# Patient Record
Sex: FEMALE | Race: WHITE | Employment: OTHER | URBAN - METROPOLITAN AREA
[De-identification: names, ages, dates, MRNs, and addresses within clinical notes are randomized per-mention and may not be internally consistent; named-entity substitution may affect disease eponyms.]

---

## 2018-01-29 PROBLEM — D22.30 COMPOUND NEVUS OF FACE: Status: ACTIVE | Noted: 2018-01-29

## 2022-12-30 ENCOUNTER — HOSPITAL ENCOUNTER (EMERGENCY)
Age: 87
Discharge: HOME | End: 2022-12-30
Attending: EMERGENCY MEDICINE
Payer: MEDICARE

## 2022-12-30 ENCOUNTER — APPOINTMENT (OUTPATIENT)
Dept: GENERAL RADIOLOGY | Age: 87
End: 2022-12-30
Payer: MEDICARE

## 2022-12-30 VITALS
RESPIRATION RATE: 18 BRPM | HEART RATE: 82 BPM | DIASTOLIC BLOOD PRESSURE: 65 MMHG | SYSTOLIC BLOOD PRESSURE: 151 MMHG | TEMPERATURE: 97.7 F | OXYGEN SATURATION: 95 %

## 2022-12-30 DIAGNOSIS — M23.92 INTERNAL DERANGEMENT OF LEFT KNEE: Primary | ICD-10-CM

## 2022-12-30 PROCEDURE — 73562 X-RAY EXAM OF KNEE 3: CPT

## 2022-12-30 PROCEDURE — 99283 EMERGENCY DEPT VISIT LOW MDM: CPT

## 2022-12-30 RX ORDER — BUMETANIDE 0.5 MG/1
0.5 TABLET ORAL DAILY
COMMUNITY

## 2022-12-30 ASSESSMENT — PAIN - FUNCTIONAL ASSESSMENT: PAIN_FUNCTIONAL_ASSESSMENT: NONE - DENIES PAIN

## 2022-12-30 NOTE — ED PROVIDER NOTES
She is [address]      Pt Name: Tomás Greco  MRN: [id number]  [place] 3/17/1934  Date of evaluation: 12/30/2022      CHIEF COMPLAINT       Chief Complaint   Patient presents with    Extremity Weakness     Pt. States having weakness in her left leg. HISTORY OF PRESENT ILLNESS      The patient presents with problems with walking in her left lower extremity. She says her knee is giving out when she walks. It does not happen all the time, but she could be walking and her knee will just collapse. She said it is not due to pain. She is not having any hip problems and is not having any redness or warmth. She has not had an injury to her knee. She did fall today but she was caught by her family member and did not injure herself. The patient has not seen a specialist for this. She denies neck or back pain. She denies focal weakness or numbness. She says that its as though the knee simply gives out and cannot hold her up. REVIEW OF SYSTEMS       All systems reviewed and negative unless noted in HPI. The patient denies fever or constitutional symptoms. Denies any neck pain or stiffness. Denies chest pain or shortness of breath. No nausea,  vomiting or diarrhea. Denies musculoskeletal injury or pain. Left knee problems as noted in HPI. Denies focal weakness or numbness. Denies stroke symptoms. Denies any skin rash or edema. No recent psychiatric issues. No easy bruising or bleeding. Denies any polyuria, polydypsia or history of immunocompromise. PAST MEDICAL HISTORY    has a past medical history of Hypertension. SURGICAL HISTORY      has a past surgical history that includes Colonoscopy (1998 & 2009).     CURRENT MEDICATIONS       Previous Medications    ACETAMINOPHEN (TYLENOL) 500 MG TABLET    Take 500 mg by mouth every 6 hours as needed for Pain    AMLODIPINE (NORVASC) 5 MG TABLET        ASPIRIN 81 MG TABLET    Take 81 mg by mouth daily    ATENOLOL-CHLORTHALIDONE (TENORETIC) 50-25 MG PER TABLET        BUMETANIDE (BUMEX) 0.5 MG TABLET    Take 0.5 mg by mouth daily    CALCIUM CARB-CHOLECALCIFEROL (CALCIUM 1000 + D) 1000-800 MG-UNIT TABS    Take by mouth    CHOLECALCIFEROL (VITAMIN D3) 5000 UNITS TABS    Take by mouth    FENOFIBRATE (TRICOR) 145 MG TABLET        GLUCOSAMINE-CHONDROIT-VIT C-MN (GLUCOSAMINE CHONDR 1500 COMPLX PO)    Take by mouth    MULTIPLE VITAMIN (MULTI-VITAMIN DAILY) TABS    Take by mouth    OMEGA-3 FATTY ACIDS (FISH OIL) 1200 MG CPDR    Take by mouth    POTASSIUM CHLORIDE (KLOR-CON) 20 MEQ PACKET    Take 20 mEq by mouth 2 times daily       ALLERGIES     is allergic to other, neurontin [gabapentin], norvasc [amlodipine besylate], and ultram [tramadol hcl]. FAMILY HISTORY     has no family status information on file. family history is not on file. SOCIAL HISTORY      reports that she has never smoked. She has never used smokeless tobacco.    PHYSICAL EXAM     INITIAL VITALS:  oral temperature is 97.7 °F (36.5 °C). Her blood pressure is 151/65 (abnormal) and her pulse is 82. Her respiration is 18 and oxygen saturation is 95%. The patient is alert and oriented, in no apparent distress. HEENT is atraumatic. Pupils are PERRL at 4 mm. Mucous membranes moist.    Neck is supple. Heart sounds regular rate and rhythm with no gallops, murmurs, or rubs. Lungs clear, no wheezes, rales or rhonchi. Abdomen: soft, nontender with no pain to palpation. Musculoskeletal exam shows no evidence of trauma. Patient is able to bear weight on her knee and to ambulate. Patient can flex and extend normally. No joint effusion or redness or warmth. No ligamentous laxity. No pain in the calf or thigh. No pain with internal or external rotation of the hip. Normal dorsalis pedis pulse noted. Normal distal pulses in all extremities. Skin: 1-2+ edema in both legs.   Neurological exam reveals cranial nerves 2 through 12 grossly intact. Patient has equal  and normal deep tendon reflexes. Psychiatric: Appropriate. Lymphatics.:  No lymphadenopathy. DIFFERENTIAL DIAGNOSIS/ MDM:     Differential diagnosis considered: Joint instability, effusion, fracture, arthritis, CVA, cauda equina syndrome    Chronic Conditions affecting care (DM,HTN,CA, etc): None      Social Determinants of Health affecting care (unable to care for self, lives alone, unemployed, homeless,etc): None      History source(s) (patient,spouse,parent,family,friend,EMS,etc): Patient and family    Review of external sources (ECF,Hospital records,EMS report, radiology reports, etc): Previous hospital records      Tests considered but not ordered: CT brain, CT lumbar spine The patient had no evidence of acute neurologic deficit, so I did not think CT imaging was warranted. Independent interpretation of tests (eg.  X-ray, CAT scan, Doppler studies, EKG): None      Discussion of x-ray results with radiology: Not applicable      Consults: None      Consideration for admission/observation (even if discharged): Not applicable      Prescription considerations: None      Sepsis considered: Not applicable      Critical Care note written: Not applicable        DIAGNOSTIC RESULTS       RADIOLOGY:   I reviewed the radiologist interpretations:  XR KNEE LEFT (3 VIEWS)   Final Result   No acute abnormality identified. XR KNEE LEFT (3 VIEWS) (Final result)  Result time 12/30/22 15:56:07  Final result by Kevon Gimenez MD (12/30/22 15:56:07)                Impression:    No acute abnormality identified. Narrative:    EXAMINATION:   THREE XRAY VIEWS OF THE LEFT KNEE     12/30/2022 12:09 pm     COMPARISON:   None.      HISTORY:   ORDERING SYSTEM PROVIDED HISTORY: knee gives out   TECHNOLOGIST PROVIDED HISTORY:   knee gives out   Reason for Exam: knee gives out   Additional signs and symptoms: Pt states left leg has been giving out and today she fell onto left knee. FINDINGS:   Bones appear demineralized. No stress, insufficiency, or traumatic fractures   are detected. No joint effusion is found. EMERGENCY DEPARTMENT COURSE:   Vitals:    Vitals:    12/30/22 1500   BP: (!) 151/65   Pulse: 82   Resp: 18   Temp: 97.7 °F (36.5 °C)   TempSrc: Oral   SpO2: 95%     -------------------------  BP: (!) 151/65, Temp: 97.7 °F (36.5 °C), Heart Rate: 82, Resp: 18      Re-evaluation Notes    The patient denies significant pain. She was provided a knee immobilizer for stability. She would like to follow-up with her PCP regarding orthopedic follow-up. The patient is discharged in good condition. PROCEDURES:    A knee immobilizer was applied by the nurse. The patient had good color, sensation, and motion of the toes after placement. FINAL IMPRESSION      1.  Internal derangement of left knee          DISPOSITION/PLAN   DISPOSITION Decision To Discharge 12/30/2022 04:14:43 PM      Condition on Disposition    good    PATIENT REFERRED TO:  Quita Don DO  [address] [address]  [phone]    In 3 days      DISCHARGE MEDICATIONS:  New Prescriptions    No medications on file       (Please note that portions of this note were completed with a voice recognition program.  Efforts were made to edit the dictations but occasionally words are mis-transcribed.)    Anand Narayan MD,, MD   Attending Emergency Physician         Gloria Dennison MD  12/30/22 2073

## 2022-12-30 NOTE — DISCHARGE INSTRUCTIONS
See your orthopedist to follow-up. Wear knee immobilizer for stability. Use walker to ambulate. Return for worsening pain, swelling, numb or cool toes, or if worse in any way. Please understand that at this time there is no evidence for a more serious underlying process, but that early in the process of an illness or injury, an emergency department workup can be falsely reassuring. You should contact your family doctor within the next 48 hours for a follow up appointment    David Lopes!!!    From [facility] and [address] [place] [address] Emergency Services    On behalf of the Emergency Department staff at [facility]), I would like to thank you for giving us the opportunity to address your health care needs and concerns. We hope that during your visit, our service was delivered in a professional and caring manner. Please keep [facility] in mind as we walk with you down the path to your own personal wellness. Please expect an automated text message or email from us so we can ask a few questions about your health and progress. Based on your answers, a clinician may call you back to offer help and instructions. Please understand that early in the process of an illness or injury, an emergency department workup can be falsely reassuring. If you notice any worsening, changing or persistent symptoms please call your family doctor or return to the ER immediately. Tell us how we did during your visit at [email or web address]   and let us know about your experience

## 2024-01-31 ENCOUNTER — HOSPITAL ENCOUNTER (OUTPATIENT)
Age: 89
Setting detail: THERAPIES SERIES
Discharge: HOME | End: 2024-01-31

## 2024-01-31 NOTE — FLOWSHEET NOTE
[] [facility]  Outpatient Rehabilitation &  Therapy  [address]  [phone]  [phone] [] [facility]  Outpatient Rehabilitation &  Therapy  [address]  P: [phone]  F: [phone] [] [facility]  Outpatient Rehabilitation &  Therapy  [address]  P: [phone]  F: [phone] [] [facility]  Outpatient Rehabilitation &  Therapy  [address]  [phone]  [phone] [] [facility]  Outpatient Rehabilitation &  Therapy  [address]   P: [phone]  F: [phone]  [x] [facility]  Outpatient Rehabilitation &  Therapy  [address]  P: [phone]  F: [phone] [] [facility]  Outpatient Rehabilitation &  Therapy  [address]  P: [phone]  F: [phone] [] [facility]  Outpatient Rehabilitation &  Therapy  [address]  P: [phone]  F: [phone] [] [facility]  Outpatient Rehabilitation &  Therapy  [address]  P: [phone]  F: [phone]  [] [facility] Outpatient Rehabilitation &  Therapy  [address]  P: [phone]  F: [phone]     Therapy Cancel/No Show note    Date: 2024  Patient: Radha Torres  : 3/17/1934  MRN: [id number]    Cancels/No Shows to date:     For today's appointment patient:    [x]  Cancelled    [] Rescheduled appointment    [] No-show     Reason given by patient:    []  Patient ill    []  Conflicting appointment    [x] No transportation      [] Conflict with work    [] No reason given    [] Weather related    [] COVID-19    [x] Other:  family emergency    Comments:        [x] Next appointment was confirmed    Electronically signed by: Eden Sanchez PT

## 2024-02-02 ENCOUNTER — HOSPITAL ENCOUNTER (OUTPATIENT)
Age: 89
Setting detail: THERAPIES SERIES
Discharge: HOME | End: 2024-02-02
Payer: MEDICARE

## 2024-02-02 PROCEDURE — 97161 PT EVAL LOW COMPLEX 20 MIN: CPT

## 2024-02-02 NOTE — CONSULTS
20   Mental Status [] Forgets limitations  [x] Oriented to own ability 15  0 0      Total:35     Based on the Assessment score: check the appropriate box.    []  No intervention needed   Low =   Score of 0-24    [x]  Use standard prevention interventions Moderate =  Score of 24-44   [x] Give patient handout and discuss fall prevention strategies   [x] Establish goal of education for patient/family RE: fall prevention strategies    []  Use high risk prevention interventions High = Score of 45 and higher   [] Give patient handout and discuss fall prevention strategies   [] Establish goal of education for patient/family Re: fall prevention strategies   [] Discuss lifeline / other resources       Functional Test: ADAN Score: 26/50 = 52% functionally impaired     Comments:    Assessment:  Patient presents with symptoms consistent with Lumbar spondylosis and R hip OA  Patient would benefit from skilled physical therapy services in order to: address deficits as stated to restore ability to  perform walk/stand, make bed, household chores, meal prep/cooking, shopping,  activity.    Problem list, as detailed above:   [x] ? Back Pain:      [x] ? ROM:     [x] ? Strength:   [x] ? Function:  [x] Postural Deviations  [x] Gait Deviations  [] Other:     STG: (to be met in 10 treatments)  ? Pain: < 5/10 to facilitate performance of ADL's  ? ROM: Lumbar flexion to WFL and pain free to facilitate dressing  ? Strength: B LE 4+/5 and core flexion to 3/5 to facilitate performing household chores  ? Function: Patient to be able to ambulate 25 feet without increased pain to be able to cook  Demonstrate Knowledge of fall prevention  LTG: (to be met in 18 treatments)  Patient to score < 25% on ADAN to facilitate shopping.  Patient to be independent with home exercise program as demonstrated by performance with correct form without cues.      Patient goals:Eliminate pain R thigh    Rehab Potential:  [x] Good  [] Fair  [] Poor   Suggested

## 2024-02-07 ENCOUNTER — HOSPITAL ENCOUNTER (OUTPATIENT)
Age: 89
Setting detail: THERAPIES SERIES
Discharge: HOME | End: 2024-02-07
Payer: MEDICARE

## 2024-02-07 PROCEDURE — 97110 THERAPEUTIC EXERCISES: CPT

## 2024-02-07 PROCEDURE — 97140 MANUAL THERAPY 1/> REGIONS: CPT

## 2024-02-07 NOTE — FLOWSHEET NOTE
[] [facility]  Outpatient Rehabilitation &  Therapy  [address]  [phone]  [phone] [] [facility]  Outpatient Rehabilitation &  Therapy  [address]  P: [phone]  F: [phone] [] [facility]  Outpatient Rehabilitation &  Therapy  [address]  P: [phone]  F: [phone] [] [facility]  Outpatient Rehabilitation &  Therapy  [address]  [phone]  [phone] [] [facility]  Outpatient Rehabilitation &  Therapy  [address]   P: [phone]  F: [phone]  [x] [facility]  Outpatient Rehabilitation &  Therapy  [address]  P: [phone]  F: [phone] [] [facility]  Outpatient Rehabilitation &  Therapy  [address]  P: [phone]  F: [phone] [] [facility]  Outpatient Rehabilitation &  Therapy  [address]  P: [phone]  F: [phone] [] [facility]  Outpatient Rehabilitation &  Therapy  [address]  P: [phone]  F: [phone]  [] [facility] Outpatient Rehabilitation &  Therapy  [address]  P: [phone]  F: [phone]     Physical Therapy Daily Treatment Note    Date:  2024  Patient Name:  Radha Torres    :  3/17/1934  MRN: [id number]  Physician: Cindy Torres DO                       Insurance: [facility] MC: BMN  Medical Diagnosis: M47.816 spondylosis without myelopathy or radiculopathy lumbar region,  M16.11          Primary OA R hip        Rehab Codes: M54.5, M25.551, M25.651  Onset Date: 11/15/2023                    Next 's appt.: TBD  Visit# / total visits: ;                                                      Progress note for Medicare patient due at visit 10     Cancels/No Shows: 1    Subjective:  getrting into bed to right must lift both

## 2024-02-14 ENCOUNTER — HOSPITAL ENCOUNTER (OUTPATIENT)
Age: 89
Setting detail: THERAPIES SERIES
Discharge: HOME | End: 2024-02-14
Payer: MEDICARE

## 2024-02-14 PROCEDURE — 97140 MANUAL THERAPY 1/> REGIONS: CPT

## 2024-02-14 PROCEDURE — 97110 THERAPEUTIC EXERCISES: CPT

## 2024-02-14 NOTE — FLOWSHEET NOTE
[] [facility]  Outpatient Rehabilitation &  Therapy  [address]  [phone]  [phone] [] [facility]  Outpatient Rehabilitation &  Therapy  [address]  P: [phone]  F: [phone] [] [facility]  Outpatient Rehabilitation &  Therapy  [address]  P: [phone]  F: [phone] [] [facility]  Outpatient Rehabilitation &  Therapy  [address]  [phone]  [phone] [] [facility]  Outpatient Rehabilitation &  Therapy  [address]   P: [phone]  F: [phone]  [x] [facility]  Outpatient Rehabilitation &  Therapy  [address]  P: [phone]  F: [phone] [] [facility]  Outpatient Rehabilitation &  Therapy  [address]  P: [phone]  F: [phone] [] [facility]  Outpatient Rehabilitation &  Therapy  [address]  P: [phone]  F: [phone] [] [facility]  Outpatient Rehabilitation &  Therapy  [address]  P: [phone]  F: [phone]  [] [facility] Outpatient Rehabilitation &  Therapy  [address]  P: [phone]  F: [phone]     Physical Therapy Daily Treatment Note    Date:  2024  Patient Name:  Radha Torres    :  3/17/1934  MRN: [id number]  Physician: Cindy Torres DO                       Insurance: [facility] MC: BMN  Medical Diagnosis: M47.816 spondylosis without myelopathy or radiculopathy lumbar region,  M16.11          Primary OA R hip        Rehab Codes: M54.5, M25.551, M25.651  Onset Date: 11/15/2023                    Next 's appt.: TBD  Visit# / total visits: 3/18;                                                      Progress note for Medicare patient due at visit 10     Cancels/No Shows: 1/0    Subjective:  Pain has not subsided but not inc. Pt did

## 2024-02-16 ENCOUNTER — HOSPITAL ENCOUNTER (OUTPATIENT)
Age: 89
Setting detail: THERAPIES SERIES
Discharge: HOME | End: 2024-02-16
Payer: MEDICARE

## 2024-02-16 ENCOUNTER — INITIAL CONSULT (OUTPATIENT)
Dept: PAIN MANAGEMENT | Age: 89
End: 2024-02-16
Payer: MEDICARE

## 2024-02-16 VITALS — BODY MASS INDEX: 26.43 KG/M2 | WEIGHT: 140 LBS | HEIGHT: 61 IN

## 2024-02-16 DIAGNOSIS — M54.17 LUMBOSACRAL NEURITIS: ICD-10-CM

## 2024-02-16 DIAGNOSIS — M25.551 PAIN OF RIGHT HIP: ICD-10-CM

## 2024-02-16 DIAGNOSIS — M47.817 LUMBOSACRAL SPONDYLOSIS WITHOUT MYELOPATHY: Primary | ICD-10-CM

## 2024-02-16 PROCEDURE — 99204 OFFICE O/P NEW MOD 45 MIN: CPT | Performed by: PAIN MEDICINE

## 2024-02-16 PROCEDURE — 97140 MANUAL THERAPY 1/> REGIONS: CPT

## 2024-02-16 PROCEDURE — 97110 THERAPEUTIC EXERCISES: CPT

## 2024-02-16 PROCEDURE — 1123F ACP DISCUSS/DSCN MKR DOCD: CPT | Performed by: PAIN MEDICINE

## 2024-02-16 RX ORDER — CARVEDILOL 6.25 MG/1
6.25 TABLET ORAL 2 TIMES DAILY WITH MEALS
COMMUNITY
Start: 2023-11-07

## 2024-02-16 RX ORDER — LISINOPRIL 40 MG/1
40 TABLET ORAL
COMMUNITY
Start: 2023-02-08

## 2024-02-16 RX ORDER — MELOXICAM 7.5 MG/1
1 TABLET ORAL 3 TIMES DAILY
COMMUNITY
Start: 2023-11-05

## 2024-02-16 RX ORDER — NIFEDIPINE 30 MG/1
1 TABLET, EXTENDED RELEASE ORAL EVERY MORNING
COMMUNITY
Start: 2024-01-02

## 2024-02-16 RX ORDER — AMOXICILLIN 500 MG
2 CAPSULE ORAL 2 TIMES DAILY
COMMUNITY

## 2024-02-16 RX ORDER — APIXABAN 2.5 MG/1
1 TABLET, FILM COATED ORAL 2 TIMES DAILY
COMMUNITY
Start: 2023-09-07

## 2024-02-16 RX ORDER — HYDRALAZINE HYDROCHLORIDE 50 MG/1
50 TABLET, FILM COATED ORAL 3 TIMES DAILY
COMMUNITY
Start: 2023-07-26

## 2024-02-16 RX ORDER — TURMERIC 400 MG
CAPSULE ORAL
COMMUNITY

## 2024-02-16 RX ORDER — SPIRONOLACTONE 25 MG/1
25 TABLET ORAL DAILY
COMMUNITY
Start: 2023-09-08

## 2024-02-16 NOTE — PROGRESS NOTES
HPI:     Back Pain  This is a chronic problem. The current episode started more than 1 year ago. The problem occurs constantly. The problem is unchanged. The pain is present in the lumbar spine. The quality of the pain is described as aching. The pain radiates to the right thigh and right knee. The pain is mild. The pain is Worse during the day. The symptoms are aggravated by bending, position, standing and twisting. Pertinent negatives include no bladder incontinence or bowel incontinence. She has tried bed rest, home exercises, heat, ice, walking, chiropractic manipulation and NSAIDs for the symptoms.     X-ray lumbar spine with degenerative changes and L1 kyphoplasty.  X-ray of the hip with moderate osteoarthritis.  She has seen pain management in the past and had several injections with questionable benefit.  Pain has been quite severe lately.    Pain ranges from a 2/10 to a 10/10.     Patient denies any new neurological symptoms. No bowel or bladder incontinence, no weakness, and no falling.    Review of OARRS does not show any aberrant prescription behavior.     Past Medical History:   Diagnosis Date    Atrial fibrillation, controlled (HCC)     Hypertension        Past Surgical History:   Procedure Laterality Date    COLONOSCOPY  1998 & 2009    FIXATION KYPHOPLASTY         Allergies   Allergen Reactions    Other Itching     6-0 suture (cat gut)     Neurontin [Gabapentin] Nausea And Vomiting     Light headed    Norvasc [Amlodipine Besylate] Nausea And Vomiting     Light headed    Ultram [Tramadol Hcl] Nausea And Vomiting     Light headed         Current Outpatient Medications:     ELIQUIS 2.5 MG TABS tablet, Take 1 tablet by mouth 2 times daily, Disp: , Rfl:     carvedilol (COREG) 6.25 MG tablet, Take 1 tablet by mouth 2 times daily (with meals), Disp: , Rfl:     hydrALAZINE (APRESOLINE) 50 MG tablet, Take 1 tablet by mouth 3 times daily, Disp: , Rfl:     lisinopril (PRINIVIL;ZESTRIL) 40 MG tablet, Take 1

## 2024-02-16 NOTE — FLOWSHEET NOTE
[] [facility]  Outpatient Rehabilitation &  Therapy  [address]  [phone]  [phone] [] [facility]  Outpatient Rehabilitation &  Therapy  [address]  P: [phone]  F: [phone] [] [facility]  Outpatient Rehabilitation &  Therapy  [address]  P: [phone]  F: [phone] [] [facility]  Outpatient Rehabilitation &  Therapy  [address]  [phone]  [phone] [] [facility]  Outpatient Rehabilitation &  Therapy  [address]   P: [phone]  F: [phone]  [x] [facility]  Outpatient Rehabilitation &  Therapy  [address]  P: [phone]  F: [phone] [] [facility]  Outpatient Rehabilitation &  Therapy  [address]  P: [phone]  F: [phone] [] [facility]  Outpatient Rehabilitation &  Therapy  [address]  P: [phone]  F: [phone] [] [facility]  Outpatient Rehabilitation &  Therapy  [address]  P: [phone]  F: [phone]  [] [facility] Outpatient Rehabilitation &  Therapy  [address]  P: [phone]  F: [phone]     Physical Therapy Daily Treatment Note    Date:  2024  Patient Name:  Radha Torres    :  3/17/1934  MRN: [id number]  Physician: Cindy Torres DO                       Insurance: [facility] MC: BMN  Medical Diagnosis: M47.816 spondylosis without myelopathy or radiculopathy lumbar region,  M16.11          Primary OA R hip        Rehab Codes: M54.5, M25.551, M25.651  Onset Date: 11/15/2023                    Next 's appt.: TBD  Visit# / total visits: ;                                                      Progress note for Medicare patient due at visit 10     Cancels/No Shows: 1/0    Subjective:  Pain has not subsided but not inc. Pt did

## 2024-02-23 ENCOUNTER — HOSPITAL ENCOUNTER (OUTPATIENT)
Age: 89
Setting detail: THERAPIES SERIES
Discharge: HOME | End: 2024-02-23
Payer: MEDICARE

## 2024-02-23 PROCEDURE — 97140 MANUAL THERAPY 1/> REGIONS: CPT

## 2024-02-23 PROCEDURE — 97110 THERAPEUTIC EXERCISES: CPT

## 2024-02-23 NOTE — FLOWSHEET NOTE
[] [facility]  Outpatient Rehabilitation &  Therapy  [address]  [phone]  [phone] [] [facility]  Outpatient Rehabilitation &  Therapy  [address]  P: [phone]  F: [phone] [] [facility]  Outpatient Rehabilitation &  Therapy  [address]  P: [phone]  F: [phone] [] [facility]  Outpatient Rehabilitation &  Therapy  [address]  [phone]  [phone] [] [facility]  Outpatient Rehabilitation &  Therapy  [address]   P: [phone]  F: [phone]  [x] [facility]  Outpatient Rehabilitation &  Therapy  [address]  P: [phone]  F: [phone] [] [facility]  Outpatient Rehabilitation &  Therapy  [address]  P: [phone]  F: [phone] [] [facility]  Outpatient Rehabilitation &  Therapy  [address]  P: [phone]  F: [phone] [] [facility]  Outpatient Rehabilitation &  Therapy  [address]  P: [phone]  F: [phone]  [] [facility] Outpatient Rehabilitation &  Therapy  [address]  P: [phone]  F: [phone]     Physical Therapy Daily Treatment Note    Date:  2024  Patient Name:  Radha Torres    :  3/17/1934  MRN: [id number]  Physician: Cindy Torres DO                       Insurance: [facility] MC: BMN  Medical Diagnosis: M47.816 spondylosis without myelopathy or radiculopathy lumbar region,  M16.11          Primary OA R hip        Rehab Codes: M54.5, M25.551, M25.651  Onset Date: 11/15/2023                    Next 's appt.: TBD  Visit# / total visits: ;                                                      Progress note for Medicare patient due at visit 10     Cancels/No Shows: 1/0    Subjective:  Pain has increased in R thigh. Unable to

## 2024-02-26 ENCOUNTER — HOSPITAL ENCOUNTER (OUTPATIENT)
Age: 89
Setting detail: THERAPIES SERIES
Discharge: HOME | End: 2024-02-26
Payer: MEDICARE

## 2024-02-26 PROCEDURE — 97110 THERAPEUTIC EXERCISES: CPT

## 2024-02-26 NOTE — FLOWSHEET NOTE
[] [facility]  Outpatient Rehabilitation &  Therapy  [address]  [phone]  [phone] [] [facility]  Outpatient Rehabilitation &  Therapy  [address]  P: [phone]  F: [phone] [] [facility]  Outpatient Rehabilitation &  Therapy  [address]  P: [phone]  F: [phone] [] [facility]  Outpatient Rehabilitation &  Therapy  [address]  [phone]  [phone] [] [facility]  Outpatient Rehabilitation &  Therapy  [address]   P: [phone]  F: [phone]  [x] [facility]  Outpatient Rehabilitation &  Therapy  [address]  P: [phone]  F: [phone] [] [facility]  Outpatient Rehabilitation &  Therapy  [address]  P: [phone]  F: [phone] [] [facility]  Outpatient Rehabilitation &  Therapy  [address]  P: [phone]  F: [phone] [] [facility]  Outpatient Rehabilitation &  Therapy  [address]  P: [phone]  F: [phone]  [] [facility] Outpatient Rehabilitation &  Therapy  [address]  P: [phone]  F: [phone]     Physical Therapy Daily Treatment Note    Date:  2024  Patient Name:  Radha Torres    :  3/17/1934  MRN: [id number]  Physician: Cindy Torres DO                       Insurance: [facility] MC: BMN  Medical Diagnosis: M47.816 spondylosis without myelopathy or radiculopathy lumbar region,  M16.11          Primary OA R hip        Rehab Codes: M54.5, M25.551, M25.651  Onset Date: 11/15/2023                    Next 's appt.: TBD  Visit# / total visits: ;                                                      Progress note for Medicare patient due at visit 10     Cancels/No Shows: 1/0    Subjective:  Pain has increased in R thigh. Unable to

## 2024-02-27 ENCOUNTER — HOSPITAL ENCOUNTER (OUTPATIENT)
Dept: MRI IMAGING | Age: 89
Discharge: HOME | End: 2024-02-29
Attending: PAIN MEDICINE
Payer: MEDICARE

## 2024-02-27 DIAGNOSIS — M54.17 LUMBOSACRAL NEURITIS: ICD-10-CM

## 2024-02-27 DIAGNOSIS — M47.817 LUMBOSACRAL SPONDYLOSIS WITHOUT MYELOPATHY: ICD-10-CM

## 2024-02-27 PROCEDURE — 72148 MRI LUMBAR SPINE W/O DYE: CPT

## 2024-02-28 ENCOUNTER — HOSPITAL ENCOUNTER (OUTPATIENT)
Age: 89
Setting detail: THERAPIES SERIES
Discharge: HOME | End: 2024-02-28
Payer: MEDICARE

## 2024-02-28 PROCEDURE — 97140 MANUAL THERAPY 1/> REGIONS: CPT

## 2024-02-28 PROCEDURE — 97110 THERAPEUTIC EXERCISES: CPT

## 2024-02-28 NOTE — FLOWSHEET NOTE
physical therapy services in order to: address deficits as stated to restore ability to  perform walk/stand, make bed, household chores, meal prep/cooking, shopping,  activity.     STG: (to be met in 10 treatments)  ? Pain: < 5/10 to facilitate performance of ADL's  ? ROM: Lumbar flexion to WFL and pain free to facilitate dressing  ? Strength: B LE 4+/5 and core flexion to 3/5 to facilitate performing household chores  ? Function: Patient to be able to ambulate 25 feet without increased pain to be able to cook  Demonstrate Knowledge of fall prevention  LTG: (to be met in 18 treatments)  Patient to score < 25% on ADAN to facilitate shopping.  Patient to be independent with home exercise program as demonstrated by performance with correct form without cues.       Pt. Education:  [x] Yes  [] No  [x] Reviewed Prior HEP/Ed  Method of Education: [x] Verbal  [] Demo  [] Written  Comprehension of Education:  [x] Verbalizes understanding.  [x] Demonstrates understanding.  [x] Needs review.  [] Demonstrates/verbalizes HEP/Ed previously given.   Access Code: [email or web address]  URL: [email or web address]  Date: 02/14/2024  Prepared by: Aminta Harp    Exercises  - Supine Gluteal Sets  - 5 x daily - 7 x weekly - 3 sets - 10 reps - 5 hold  - Supine Quadricep Sets  - 5 x daily - 7 x weekly - 3 sets - 10 reps - 5 hold  - Standing Hip Flexion with Counter Support  - 1 x daily - 7 x weekly - 2 sets - 10 reps - - hold  - Standing Hip Extension with Counter Support  - 1 x daily - 7 x weekly - 2 sets - 10 reps - - hold  - Standing Hip Abduction with Counter Support  - 1 x daily - 7 x weekly - 2 sets - 10 reps - - hold  - Gastroc Stretch with Foot at Wall  - 3 x daily - 7 x weekly - 1 sets - 10 reps - 10 hold  Instructed to use her walker at all times for now to prevent falls due to pain and guarding R hip - 2/14/24 Coninued education for RW use after pt stated she was not using RW in public and just occasionally at home.  Plan: [x]

## 2024-03-01 ENCOUNTER — HOSPITAL ENCOUNTER (OUTPATIENT)
Age: 89
Setting detail: THERAPIES SERIES
Discharge: HOME | End: 2024-03-01
Payer: MEDICARE

## 2024-03-01 PROCEDURE — 97110 THERAPEUTIC EXERCISES: CPT

## 2024-03-01 PROCEDURE — 97140 MANUAL THERAPY 1/> REGIONS: CPT

## 2024-03-01 NOTE — FLOWSHEET NOTE
[] [facility]  Outpatient Rehabilitation &  Therapy  [address]  [phone]  [phone] [] [facility]  Outpatient Rehabilitation &  Therapy  [address]  P: [phone]  F: [phone] [] [facility]  Outpatient Rehabilitation &  Therapy  [address]  P: [phone]  F: [phone] [] [facility]  Outpatient Rehabilitation &  Therapy  [address]  [phone]  [phone] [] [facility]  Outpatient Rehabilitation &  Therapy  [address]   P: [phone]  F: [phone]  [x] [facility]  Outpatient Rehabilitation &  Therapy  [address]  P: [phone]  F: [phone] [] [facility]  Outpatient Rehabilitation &  Therapy  [address]  P: [phone]  F: [phone] [] [facility]  Outpatient Rehabilitation &  Therapy  [address]  P: [phone]  F: [phone] [] [facility]  Outpatient Rehabilitation &  Therapy  [address]  P: [phone]  F: [phone]  [] [facility] Outpatient Rehabilitation &  Therapy  [address]  P: [phone]  F: [phone]     Physical Therapy Daily Treatment Note    Date:  3/1/2024  Patient Name:  Radha Torres    :  3/17/1934  MRN: [id number]  Physician: Cindy Torres DO                       Insurance: [facility] MC: BMN  Medical Diagnosis: M47.816 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

## 2024-03-05 ENCOUNTER — OFFICE VISIT (OUTPATIENT)
Age: 89
End: 2024-03-05
Payer: MEDICARE

## 2024-03-05 VITALS — WEIGHT: 126 LBS | BODY MASS INDEX: 23.79 KG/M2 | HEIGHT: 61 IN

## 2024-03-05 DIAGNOSIS — M16.11 OSTEOARTHRITIS OF RIGHT HIP, UNSPECIFIED OSTEOARTHRITIS TYPE: Primary | ICD-10-CM

## 2024-03-05 PROCEDURE — 99204 OFFICE O/P NEW MOD 45 MIN: CPT | Performed by: ORTHOPAEDIC SURGERY

## 2024-03-05 PROCEDURE — 1123F ACP DISCUSS/DSCN MKR DOCD: CPT | Performed by: ORTHOPAEDIC SURGERY

## 2024-03-06 ENCOUNTER — HOSPITAL ENCOUNTER (OUTPATIENT)
Age: 89
Setting detail: THERAPIES SERIES
Discharge: HOME | End: 2024-03-06
Payer: MEDICARE

## 2024-03-06 PROCEDURE — G0283 ELEC STIM OTHER THAN WOUND: HCPCS

## 2024-03-06 PROCEDURE — 97110 THERAPEUTIC EXERCISES: CPT

## 2024-03-06 PROCEDURE — 97124 MASSAGE THERAPY: CPT

## 2024-03-06 NOTE — FLOWSHEET NOTE
[] [facility]  Outpatient Rehabilitation &  Therapy  [address]  [phone]  [phone] [] [facility]  Outpatient Rehabilitation &  Therapy  [address]  P: [phone]  F: [phone] [] [facility]  Outpatient Rehabilitation &  Therapy  [address]  P: [phone]  F: [phone] [] [facility]  Outpatient Rehabilitation &  Therapy  [address]  [phone]  [phone] [] [facility]  Outpatient Rehabilitation &  Therapy  [address]   P: [phone]  F: [phone]  [x] [facility]  Outpatient Rehabilitation &  Therapy  [address]  P: [phone]  F: [phone] [] [facility]  Outpatient Rehabilitation &  Therapy  [address]  P: [phone]  F: [phone] [] [facility]  Outpatient Rehabilitation &  Therapy  [address]  P: [phone]  F: [phone] [] [facility]  Outpatient Rehabilitation &  Therapy  [address]  P: [phone]  F: [phone]  [] [facility] Outpatient Rehabilitation &  Therapy  [address]  P: [phone]  F: [phone]     Physical Therapy Daily Treatment Note    Date:  3/6/2024  Patient Name:  Radha Torres    :  3/17/1934  MRN: [id number]  Physician: Cindy Torres DO                       Insurance: [facility] MC: BMN  Medical Diagnosis: M47.816 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 
no

## 2024-03-08 ENCOUNTER — HOSPITAL ENCOUNTER (OUTPATIENT)
Age: 89
Setting detail: THERAPIES SERIES
Discharge: HOME | End: 2024-03-08
Payer: MEDICARE

## 2024-03-08 PROCEDURE — 97110 THERAPEUTIC EXERCISES: CPT

## 2024-03-08 NOTE — PROGRESS NOTES
[] [facility]  Outpatient Rehabilitation &  Therapy  [address]  [phone]  [phone] [] [place]  Outpatient Rehabilitation &  Therapy  [address]  P: [phone]  F: [phone] [] [facility]  Outpatient Rehabilitation &  Therapy  [address]  P: [phone]  F: [phone] [] [facility]  Outpatient Rehabilitation &  Therapy  [address]  [phone]  [phone] [] [facility]  Outpatient Rehabilitation &  Therapy  [address]   P: [phone]  F: [phone]  [x] [facility]  Outpatient Rehabilitation &  Therapy  [address]  P: [phone]  F: [phone] [] [facility]  Outpatient Rehabilitation &  Therapy  [address]  P: [phone]  F: [phone] [] [facility]  Outpatient Rehabilitation &  Therapy  [address]  P: [phone]  F: [phone] [] [facility]  Outpatient Rehabilitation &  Therapy  [address]  P: [phone]  F: [phone]  [] [facility] Outpatient Rehabilitation &  Therapy  [address]  P: [phone]  F: [phone]     Physical Therapy Daily Treatment Note/Progress Note    Date:  3/8/2024  Patient Name:  Radha Torres    :  3/17/1934  MRN: [id number]  Physician: Cindy Torres DO                       Insurance: [facility] MC: BMN  Medical Diagnosis: M47.816 spondylosis without myelopathy or radiculopathy lumbar region,  M16.11          Primary OA R hip        Rehab Codes: M54.5, M25.551, M25.651  Onset Date: 11/15/2023                    Next 's appt.: TBD  Visit# / total visits: ;                                                      Progress note for Medicare patient due at visit 10     Cancels/No Shows: 1/0    Subjective:  Provokes with prolonged

## 2024-03-08 NOTE — PROGRESS NOTES
[] [facility]  Outpatient Rehabilitation &  Therapy  [address]  [phone]  [phone] [] [facility]  Outpatient Rehabilitation &  Therapy  [address]  P: [phone]  F: [phone] [] [facility]  Outpatient Rehabilitation &  Therapy  [address]  P: [phone]  F: [phone] [] [facility]  Outpatient Rehabilitation &  Therapy  [address]  [phone]  [phone] [] [facility]  Outpatient Rehabilitation &  Therapy  [address]   P: [phone]  F: [phone]  [x] [facility]  Outpatient Rehabilitation &  Therapy  [address]  P: [phone]  F: [phone] [] [facility]  Outpatient Rehabilitation &  Therapy  [address]  P: [phone]  F: [phone] [] [facility]  Outpatient Rehabilitation &  Therapy  [address]  P: [phone]  F: [phone] [] [facility]  Outpatient Rehabilitation &  Therapy  [address]  P: [phone]  F: [phone]  [] [facility] Outpatient Rehabilitation &  Therapy  [address]  P: [phone]  F: [phone]     Physical Therapy Daily Treatment Note/Progress Note    Date:  3/8/2024  Patient Name:  Radha Torres    :  3/17/1934  MRN: [id number]  Physician: Cindy Torres DO                       Insurance: [facility] MC: BMN  Medical Diagnosis: M47.816 spondylosis without myelopathy or radiculopathy lumbar region,  M16.11          Primary OA R hip        Rehab Codes: M54.5, M25.551, M25.651  Onset Date: 11/15/2023                    Next 's appt.: TBD  Visit# / total visits: ;                                                      Progress note for Medicare patient due at visit 10     Cancels/No Shows: 1/0    Subjective:    Pain:  [x] Yes  []

## 2024-03-11 ENCOUNTER — HOSPITAL ENCOUNTER (OUTPATIENT)
Age: 89
Setting detail: THERAPIES SERIES
Discharge: HOME | End: 2024-03-11
Payer: MEDICARE

## 2024-03-11 PROCEDURE — G0283 ELEC STIM OTHER THAN WOUND: HCPCS

## 2024-03-11 PROCEDURE — 97140 MANUAL THERAPY 1/> REGIONS: CPT

## 2024-03-11 PROCEDURE — 97110 THERAPEUTIC EXERCISES: CPT

## 2024-03-11 NOTE — FLOWSHEET NOTE
understanding.  [x] Needs review.  [] Demonstrates/verbalizes HEP/Ed previously given.   Access Code: [email or web address]  URL: [email or web address]  Date: 02/14/2024  Prepared by: Aminta Harp    Exercises  - Supine Gluteal Sets  - 5 x daily - 7 x weekly - 3 sets - 10 reps - 5 hold  - Supine Quadricep Sets  - 5 x daily - 7 x weekly - 3 sets - 10 reps - 5 hold  - Standing Hip Flexion with Counter Support  - 1 x daily - 7 x weekly - 2 sets - 10 reps - - hold  - Standing Hip Extension with Counter Support  - 1 x daily - 7 x weekly - 2 sets - 10 reps - - hold  - Standing Hip Abduction with Counter Support  - 1 x daily - 7 x weekly - 2 sets - 10 reps - - hold  - Gastroc Stretch with Foot at Wall  - 3 x daily - 7 x weekly - 1 sets - 10 reps - 10 hold  Instructed to use her walker at all times for now to prevent falls due to pain and guarding R hip - 2/14/24 Coninued education for RW use after pt stated she was not using RW in public and just occasionally at home.  Plan: [x] Continue current frequency toward long and short term goals.    [x] Specific Instructions for subsequent treatments:  Continue per POC to inc strength in B LE and reduce pain in R hip and low back pain. Modalities for pain          Time In:    1630   Time Out: 1730    Electronically signed by:  Eden Sanchez, PT

## 2024-03-13 ENCOUNTER — HOSPITAL ENCOUNTER (OUTPATIENT)
Age: 89
Setting detail: THERAPIES SERIES
Discharge: HOME | End: 2024-03-13
Payer: MEDICARE

## 2024-03-13 PROCEDURE — 97140 MANUAL THERAPY 1/> REGIONS: CPT

## 2024-03-13 PROCEDURE — 97110 THERAPEUTIC EXERCISES: CPT

## 2024-03-13 NOTE — FLOWSHEET NOTE
understanding.  [x] Needs review.  [] Demonstrates/verbalizes HEP/Ed previously given.   Access Code: [email or web address]  URL: [email or web address]  Date: 02/14/2024  Prepared by: Aminta Harp    Exercises  - Supine Gluteal Sets  - 5 x daily - 7 x weekly - 3 sets - 10 reps - 5 hold  - Supine Quadricep Sets  - 5 x daily - 7 x weekly - 3 sets - 10 reps - 5 hold  - Standing Hip Flexion with Counter Support  - 1 x daily - 7 x weekly - 2 sets - 10 reps - - hold  - Standing Hip Extension with Counter Support  - 1 x daily - 7 x weekly - 2 sets - 10 reps - - hold  - Standing Hip Abduction with Counter Support  - 1 x daily - 7 x weekly - 2 sets - 10 reps - - hold  - Gastroc Stretch with Foot at Wall  - 3 x daily - 7 x weekly - 1 sets - 10 reps - 10 hold  Instructed to use her walker at all times for now to prevent falls due to pain and guarding R hip - 2/14/24 Coninued education for RW use after pt stated she was not using RW in public and just occasionally at home.  Plan: [x] Continue current frequency toward long and short term goals.    [x] Specific Instructions for subsequent treatments:  Continue per POC to inc strength in B LE and reduce pain in R hip and low back pain.         Time In:  1715   Time Out: 1800    Electronically signed by:  Aminta Harp PTA

## 2024-03-18 ENCOUNTER — HOSPITAL ENCOUNTER (OUTPATIENT)
Age: 89
Setting detail: THERAPIES SERIES
Discharge: HOME | End: 2024-03-18
Payer: MEDICARE

## 2024-03-18 PROCEDURE — 97140 MANUAL THERAPY 1/> REGIONS: CPT

## 2024-03-18 PROCEDURE — 97110 THERAPEUTIC EXERCISES: CPT

## 2024-03-18 NOTE — FLOWSHEET NOTE
Tender R quad.  Assessment: Patient presents with symptoms consistent with R hip OA and Lumbar spondylosis                             [x] Progressing toward goals.    [] No change.     [] Other:  [x] Patient would benefit from skilled physical therapy services in order to: address deficits as stated to restore ability to  perform walk/stand, make bed, household chores, meal prep/cooking, shopping,  activity.     STG: (to be met in 10 treatments)  ? Pain: < 5/10 to facilitate performance of ADL's                                                                                                                                         Progressing  ? ROM: Lumbar flexion to WFL and pain free to facilitate dressing                                                                                                             Not met  ? Strength: B LE 4+/5 and core flexion to 3/5 to facilitate performing household chores                                                                             Progressing  ? Function: Patient to be able to ambulate 25 feet without increased pain to be able to cook                                                                     Progressing  Demonstrate Knowledge of fall prevention  LTG: (to be met in 18 treatments)  Patient to score < 25% on ADAN to facilitate shopping.                                                                                                                                   Progressing  Patient to be independent with home exercise program as demonstrated by performance with correct form without cues.                              Progressing       Pt. Education:  [x] Yes  [] No  [] Reviewed Prior HEP/Ed  Method of Education: [x] Verbal  [] Demo  [] Written  Comprehension of Education:  [x] Verbalizes understanding.  [x] Demonstrates understanding.  [x] Needs review.  [] Demonstrates/verbalizes HEP/Ed previously given.   Access Code: [email or web address]  URL:

## 2024-03-20 ENCOUNTER — HOSPITAL ENCOUNTER (OUTPATIENT)
Age: 89
Setting detail: THERAPIES SERIES
Discharge: HOME | End: 2024-03-20
Payer: MEDICARE

## 2024-03-20 PROCEDURE — 97140 MANUAL THERAPY 1/> REGIONS: CPT

## 2024-03-20 PROCEDURE — 97110 THERAPEUTIC EXERCISES: CPT

## 2024-03-20 NOTE — FLOWSHEET NOTE
[] [facility]  Outpatient Rehabilitation &  Therapy  [address]  [phone]  [phone] [] [facility]  Outpatient Rehabilitation &  Therapy  [address]  P: [phone]  F: [phone] [] [facility]  Outpatient Rehabilitation &  Therapy  [address]  P: [phone]  F: [phone] [] [facility]  Outpatient Rehabilitation &  Therapy  [address]  [phone]  [phone] [] [facility]  Outpatient Rehabilitation &  Therapy  [address]   P: [phone]  F: [phone]  [x] [facility]  Outpatient Rehabilitation &  Therapy  [address]  P: [phone]  F: [phone] [] [facility]  Outpatient Rehabilitation &  Therapy  [address]  P: [phone]  F: [phone] [] [facility]  Outpatient Rehabilitation &  Therapy  [address]  P: [phone]  F: [phone] [] [facility]  Outpatient Rehabilitation &  Therapy  [address]  P: [phone]  F: [phone]  [] [facility] Outpatient Rehabilitation &  Therapy  [address]  P: [phone]  F: [phone]     Physical Therapy Daily Treatment Note    Date:  3/20/2024  Patient Name:  Radha Torres    :  3/17/1934  MRN: [id number]  Physician: Cindy Torres DO                       Insurance: [facility] MC: BMN  Medical Diagnosis: M47.816 spondylosis without myelopathy or radiculopathy lumbar region,  M16.11          Primary OA R hip        Rehab Codes: M54.5, M25.551, M25.651  Onset Date: 11/15/2023                    Next 's appt.: 3/25/2024  Visit# / total visits: ;                                                      Progress note for Medicare patient due at visit 10     Cancels/No Shows: 1/0    Subjective:   Pain:  [x] Yes  [] No Location: R

## 2024-03-25 ENCOUNTER — OFFICE VISIT (OUTPATIENT)
Dept: PAIN MANAGEMENT | Age: 89
End: 2024-03-25
Payer: MEDICARE

## 2024-03-25 VITALS — WEIGHT: 126 LBS | HEIGHT: 61 IN | BODY MASS INDEX: 23.79 KG/M2

## 2024-03-25 DIAGNOSIS — M47.817 LUMBOSACRAL SPONDYLOSIS WITHOUT MYELOPATHY: Primary | ICD-10-CM

## 2024-03-25 DIAGNOSIS — M25.551 PAIN OF RIGHT HIP: ICD-10-CM

## 2024-03-25 DIAGNOSIS — M46.1 SACROILIITIS (HCC): ICD-10-CM

## 2024-03-25 PROCEDURE — 99214 OFFICE O/P EST MOD 30 MIN: CPT | Performed by: PAIN MEDICINE

## 2024-03-25 PROCEDURE — 1123F ACP DISCUSS/DSCN MKR DOCD: CPT | Performed by: PAIN MEDICINE

## 2024-03-25 ASSESSMENT — ENCOUNTER SYMPTOMS
BOWEL INCONTINENCE: 1
BACK PAIN: 1

## 2024-03-25 NOTE — PROGRESS NOTES
HPI:     Back Pain  This is a chronic problem. The current episode started more than 1 year ago. The problem occurs constantly. The problem is unchanged. The pain is present in the lumbar spine. The quality of the pain is described as aching. The pain radiates to the right thigh and right knee. The pain is at a severity of 6/10. The pain is severe. The pain is Worse during the day. The symptoms are aggravated by bending and standing. Associated symptoms include bowel incontinence. Pertinent negatives include no bladder incontinence, numbness or tingling. She has tried walking, ice, heat, home exercises, chiropractic manipulation and NSAIDs for the symptoms. The treatment provided moderate relief.     X-ray of the right hip with moderate osteoarthritis.  X-ray lumbar spine degenerative changes and scoliosis.  MRI of the lumbar spine with degenerative changes and varying levels of stenosis.  He has seen orthopedics, note reviewed.  Did discuss hip replacement, patient does not wish to consider at this time.  She had multiple injections in the back many years ago with questionable benefit.  History of remote L1 compression fracture.    Pain ranges from a 2/10 to a 10/10 depending on activity.    Patient denies any new neurological symptoms. Nobowel or bladder incontinence, no weakness, and no falling.    Review of OARRS does not show any aberrant prescription behavior.     Past Medical History:   Diagnosis Date    Atrial fibrillation, controlled (HCC)     Hypertension        Past Surgical History:   Procedure Laterality Date    COLONOSCOPY  1998 & 2009    FIXATION KYPHOPLASTY         Allergies   Allergen Reactions    Other Itching     6-0 suture (cat gut)     Neurontin [Gabapentin] Nausea And Vomiting     Light headed    Norvasc [Amlodipine Besylate] Nausea And Vomiting     Light headed    Ultram [Tramadol Hcl] Nausea And Vomiting     Light headed         Current Outpatient Medications:     ELIQUIS 2.5 MG TABS tablet,

## 2024-03-28 ENCOUNTER — HOSPITAL ENCOUNTER (OUTPATIENT)
Age: 89
Setting detail: THERAPIES SERIES
Discharge: HOME | End: 2024-03-28
Payer: MEDICARE

## 2024-03-28 PROCEDURE — 97140 MANUAL THERAPY 1/> REGIONS: CPT

## 2024-03-28 PROCEDURE — 97110 THERAPEUTIC EXERCISES: CPT

## 2024-03-28 NOTE — FLOWSHEET NOTE
[] [facility]  Outpatient Rehabilitation &  Therapy  [address]  [phone]  [phone] [] [facility]  Outpatient Rehabilitation &  Therapy  [address]  P: [phone]  F: [phone] [] [facility]  Outpatient Rehabilitation &  Therapy  [address]  P: [phone]  F: [phone] [] [facility]  Outpatient Rehabilitation &  Therapy  [address]  [phone]  [phone] [] [facility]  Outpatient Rehabilitation &  Therapy  [address]   P: [phone]  F: [phone]  [x] [facility]  Outpatient Rehabilitation &  Therapy  [address]  P: [phone]  F: [phone] [] [facility]  Outpatient Rehabilitation &  Therapy  [address]  P: [phone]  F: [phone] [] [facility]  Outpatient Rehabilitation &  Therapy  [address]  P: [phone]  F: [phone] [] [facility]  Outpatient Rehabilitation &  Therapy  [address]  P: [phone]  F: [phone]  [] [facility] Outpatient Rehabilitation &  Therapy  [address]  P: [phone]  F: [phone]     Physical Therapy Daily Treatment Note    Date:  3/28/2024  Patient Name:  Radha Torres    :  3/17/1934  MRN: [id number]  Physician: Cindy Torres DO                       Insurance: [facility] MC: BMN  Medical Diagnosis: M47.816 spondylosis without myelopathy or radiculopathy lumbar region,  M16.11          Primary OA R hip        Rehab Codes: M54.5, M25.551, M25.651  Onset Date: 11/15/2023                    Next 's appt.: 3/25/2024  Visit# / total visits: ;                                                      Progress note for Medicare patient due at visit 10     Cancels/No Shows: 1/0    Subjective:  Denies hip pain since 3/25/2024

## 2024-04-01 ENCOUNTER — HOSPITAL ENCOUNTER (OUTPATIENT)
Age: 89
Setting detail: THERAPIES SERIES
Discharge: HOME | End: 2024-04-01
Payer: MEDICARE

## 2024-04-01 PROCEDURE — 97140 MANUAL THERAPY 1/> REGIONS: CPT

## 2024-04-01 PROCEDURE — 97110 THERAPEUTIC EXERCISES: CPT

## 2024-04-01 NOTE — FLOWSHEET NOTE
[] [facility]  Outpatient Rehabilitation &  Therapy  [address]  [phone]  [phone] [] [facility]  Outpatient Rehabilitation &  Therapy  [address]  P: [phone]  F: [phone] [] [facility]  Outpatient Rehabilitation &  Therapy  [address]  P: [phone]  F: [phone] [] [facility]  Outpatient Rehabilitation &  Therapy  [address]  [phone]  [phone] [] [facility]  Outpatient Rehabilitation &  Therapy  [address]   P: [phone]  F: [phone]  [x] [facility]  Outpatient Rehabilitation &  Therapy  [address]  P: [phone]  F: [phone] [] [facility]  Outpatient Rehabilitation &  Therapy  [address]  P: [phone]  F: [phone] [] [facility]  Outpatient Rehabilitation &  Therapy  [address]  P: [phone]  F: [phone] [] [facility]  Outpatient Rehabilitation &  Therapy  [address]  P: [phone]  F: [phone]  [] [facility] Outpatient Rehabilitation &  Therapy  [address]  P: [phone]  F: [phone]     Physical Therapy Daily Treatment Note    Date:  2024  Patient Name:  Radha Torres    :  3/17/1934  MRN: [id number]  Physician: Cindy Torres DO                       Insurance: [facility] MC: BMN  Medical Diagnosis: M47.816 spondylosis without myelopathy or radiculopathy lumbar region,  M16.11          Primary OA R hip        Rehab Codes: M54.5, M25.551, M25.651  Onset Date: 11/15/2023                    Next 's appt.: 3/25/2024  Visit# / total visits: 15/18;                                                      Progress note for Medicare patient due at visit 10     Cancels/No Shows: 1/0    Subjective:    Pain:  [x] Yes  [] No Location: R

## 2024-04-04 ENCOUNTER — HOSPITAL ENCOUNTER (OUTPATIENT)
Age: 89
Setting detail: THERAPIES SERIES
Discharge: HOME | End: 2024-04-04
Payer: MEDICARE

## 2024-04-04 PROCEDURE — 97140 MANUAL THERAPY 1/> REGIONS: CPT

## 2024-04-04 PROCEDURE — 97110 THERAPEUTIC EXERCISES: CPT

## 2024-04-04 NOTE — FLOWSHEET NOTE
[] [facility]  Outpatient Rehabilitation &  Therapy  [address]  [phone]  [phone] [] [facility]  Outpatient Rehabilitation &  Therapy  [address]  P: [phone]  F: [phone] [] [facility]  Outpatient Rehabilitation &  Therapy  [address]  P: [phone]  F: [phone] [] [facility]  Outpatient Rehabilitation &  Therapy  [address]  [phone]  [phone] [] [facility]  Outpatient Rehabilitation &  Therapy  [address]   P: [phone]  F: [phone]  [x] [facility]  Outpatient Rehabilitation &  Therapy  [address]  P: [phone]  F: [phone] [] [facility]  Outpatient Rehabilitation &  Therapy  [address]  P: [phone]  F: [phone] [] [facility]  Outpatient Rehabilitation &  Therapy  [address]  P: [phone]  F: [phone] [] [facility]  Outpatient Rehabilitation &  Therapy  [address]  P: [phone]  F: [phone]  [] [facility] Outpatient Rehabilitation &  Therapy  [address]  P: [phone]  F: [phone]     Physical Therapy Daily Treatment Note    Date:  2024  Patient Name:  Radha Torres    :  3/17/1934  MRN: [id number]  Physician: Cindy Torres DO                       Insurance: [facility] MC: BMN  Medical Diagnosis: M47.816 spondylosis without myelopathy or radiculopathy lumbar region,  M16.11          Primary OA R hip        Rehab Codes: M54.5, M25.551, M25.651  Onset Date: 11/15/2023                    Next 's appt.: 3/25/2024  Visit# / total visits: ;                                                      Progress note for Medicare patient due at visit 10     Cancels/No Shows: 1/0    Subjective:  Per patient MD gave options: hip

## 2024-04-10 ENCOUNTER — HOSPITAL ENCOUNTER (OUTPATIENT)
Age: 89
Setting detail: THERAPIES SERIES
Discharge: HOME | End: 2024-04-10
Payer: MEDICARE

## 2024-04-10 PROCEDURE — 97110 THERAPEUTIC EXERCISES: CPT

## 2024-04-10 NOTE — FLOWSHEET NOTE
groin Pain Rating: (0-10 scale) 0/10,                                      Pain at worst: 8/10. LBP increases with increased activity  Pain altered Tx:  [x] No  [] Yes  Action:  Comments: No changes with pain. Pt stated she has periods of no pain and then periods of excessive pain with amb and mobility. Pt reported she has an appt scheduled with orthopedic surgeon to calculate \"next step and today will be last PT session.\"     Objective:    (4/10/24)   MMT B LE knee/ankle WFL , R hip flexion and adduction 4+/5 without pain, Gait/functional ambulation: Patient ambulates in home and clinic without AD, HHA of family when out in public.  ADAN score is 36% functionally impaired  Modalities: Declined  Precautions: pain free exercise   Exercises:  Exercise Reps/ Time Weight/ Level Comments   hooklying      Nustep 5 min L5    Heel raises, toe raises X 20 each           Squats  X 10     Standing 3 way hip B 2 x 10 each 3#    Clamshell B LE with pillow between knees X 10   AROM                  Seated LAQ 2 X 10 4# Cues for eccentric hold   Ham curl X 20 blue T cord    Adductor sets in hooklying 20 x 10\"     Hip abduction B in  hooklying X 20 Green T CORD    Supine quad sets NP     Supine gluteal sets 20 x 10\"     Sidelying R hip abduction X 10 AA          Gait       bridges X 10     STM R thigh, hip flexor, medial thigh, sartorius  GR II, gentle    Light distraction of R hip   5'     Massage through  R superior gluteal NP Very  gentle    Estim with MHP   No estim   Other:      Treatment Charges: Mins Units   [x]  Modalities MHP     [x]  Ther Exercise 40 3   [x]  Manual Therapy     []  Ther Activities     []  Neuro Re-ed     []  Vasocompression     [] Gait     []  Other     Total Billable time 40 3     Response to treatment:  No inc in pain following session. Pt is able to demonstrate good recall of exercise program with little to no cuing required. Patient has made gains in gait, ROM, strength and function but high pain

## 2024-04-11 ENCOUNTER — OFFICE VISIT (OUTPATIENT)
Age: 89
End: 2024-04-11
Payer: MEDICARE

## 2024-04-11 VITALS — BODY MASS INDEX: 23.79 KG/M2 | HEIGHT: 61 IN | WEIGHT: 126 LBS

## 2024-04-11 DIAGNOSIS — M16.11 OSTEOARTHRITIS OF RIGHT HIP, UNSPECIFIED OSTEOARTHRITIS TYPE: Primary | ICD-10-CM

## 2024-04-11 PROCEDURE — 99213 OFFICE O/P EST LOW 20 MIN: CPT | Performed by: NURSE PRACTITIONER

## 2024-04-11 PROCEDURE — 1123F ACP DISCUSS/DSCN MKR DOCD: CPT | Performed by: NURSE PRACTITIONER

## 2024-04-11 NOTE — PROGRESS NOTES
suture (cat gut)     Neurontin [Gabapentin] Nausea And Vomiting     Light headed    Norvasc [Amlodipine Besylate] Nausea And Vomiting     Light headed    Ultram [Tramadol Hcl] Nausea And Vomiting     Light headed     Social History     Socioeconomic History    Marital status:      Spouse name: Not on file    Number of children: Not on file    Years of education: Not on file    Highest education level: Not on file   Occupational History    Not on file   Tobacco Use    Smoking status: Never    Smokeless tobacco: Never   Substance and Sexual Activity    Alcohol use: Not on file    Drug use: Not on file    Sexual activity: Not on file   Other Topics Concern    Not on file   Social History Narrative    Not on file     Social Determinants of Health     Financial Resource Strain: Not on file   Food Insecurity: Not on file   Transportation Needs: Not on file   Physical Activity: Not on file   Stress: Not on file   Social Connections: Not on file   Intimate Partner Violence: Not on file   Housing Stability: Not on file     Past Medical History:   Diagnosis Date    Atrial fibrillation, controlled (HCC)     Hypertension      Past Surgical History:   Procedure Laterality Date    COLONOSCOPY  1998 & 2009    FIXATION KYPHOPLASTY       History reviewed. No pertinent family history.       Electronically signed by DIMAS Glasgow CNP on 4/11/2024 at 11:46 AM     Please note that this chart was generated using voice recognition Dragon dictation software.  Although every effort was made to ensure the accuracy of this automated transcription, some errors in transcription may have occurred.

## 2024-05-06 ENCOUNTER — OFFICE VISIT (OUTPATIENT)
Dept: PAIN MANAGEMENT | Age: 89
End: 2024-05-06
Payer: MEDICARE

## 2024-05-06 VITALS — BODY MASS INDEX: 23.79 KG/M2 | WEIGHT: 126 LBS | HEIGHT: 61 IN

## 2024-05-06 DIAGNOSIS — M54.17 LUMBOSACRAL NEURITIS: Primary | ICD-10-CM

## 2024-05-06 DIAGNOSIS — M47.817 LUMBOSACRAL SPONDYLOSIS WITHOUT MYELOPATHY: ICD-10-CM

## 2024-05-06 DIAGNOSIS — M46.1 SACROILIITIS (HCC): ICD-10-CM

## 2024-05-06 DIAGNOSIS — M25.551 PAIN OF RIGHT HIP: ICD-10-CM

## 2024-05-06 PROCEDURE — 99214 OFFICE O/P EST MOD 30 MIN: CPT | Performed by: PAIN MEDICINE

## 2024-05-06 PROCEDURE — 1123F ACP DISCUSS/DSCN MKR DOCD: CPT | Performed by: PAIN MEDICINE

## 2024-05-06 ASSESSMENT — ENCOUNTER SYMPTOMS
BOWEL INCONTINENCE: 0
BACK PAIN: 1

## 2024-05-06 NOTE — PROGRESS NOTES
HPI:     Back Pain  This is a chronic problem. The current episode started more than 1 year ago. The problem occurs constantly. The problem is unchanged. The pain is present in the lumbar spine. The quality of the pain is described as aching. The pain does not radiate. The pain is mild. The pain is Worse during the day. The symptoms are aggravated by position, bending, twisting and standing. Pertinent negatives include no bladder incontinence or bowel incontinence. She has tried bed rest, home exercises, heat, ice, walking, NSAIDs and chiropractic manipulation for the symptoms.     MRI lumbar spine with moderate stenosis as well as degenerative changes.  History of L1 kyphoplasty.  Does not wish to consider surgery.  Also has right hip pain that radiates into the right groin.  X-ray of the right hip with moderate osteoarthritis.  She has seen a surgeon.  Does not wish to consider hip replacement.  Suggested hip injection.  She is on Eliquis.    Pain ranges from a 2/10 to a 10/10 depending on activity.    Patient denies any new neurological symptoms. Nobowel or bladder incontinence, no weakness, and no falling.    Review of OARRS does not show any aberrant prescription behavior.     Past Medical History:   Diagnosis Date    Atrial fibrillation, controlled (HCC)     Hypertension        Past Surgical History:   Procedure Laterality Date    COLONOSCOPY  1998 & 2009    FIXATION KYPHOPLASTY         Allergies   Allergen Reactions    Other Itching     6-0 suture (cat gut)     Neurontin [Gabapentin] Nausea And Vomiting     Light headed    Norvasc [Amlodipine Besylate] Nausea And Vomiting     Light headed    Ultram [Tramadol Hcl] Nausea And Vomiting     Light headed         Current Outpatient Medications:     ELIQUIS 2.5 MG TABS tablet, Take 1 tablet by mouth 2 times daily, Disp: , Rfl:     carvedilol (COREG) 6.25 MG tablet, Take 1 tablet by mouth 2 times daily (with meals), Disp: , Rfl:     hydrALAZINE (APRESOLINE) 50 MG

## 2024-05-09 ENCOUNTER — HOSPITAL ENCOUNTER (OUTPATIENT)
Dept: PAIN MANAGEMENT | Facility: CLINIC | Age: 89
Discharge: HOME | End: 2024-05-09

## 2024-05-09 VITALS
HEART RATE: 72 BPM | WEIGHT: 124 LBS | BODY MASS INDEX: 23.41 KG/M2 | OXYGEN SATURATION: 96 % | DIASTOLIC BLOOD PRESSURE: 87 MMHG | RESPIRATION RATE: 16 BRPM | HEIGHT: 61 IN | TEMPERATURE: 98 F | SYSTOLIC BLOOD PRESSURE: 197 MMHG

## 2024-05-09 DIAGNOSIS — R52 PAIN MANAGEMENT: ICD-10-CM

## 2024-05-09 ASSESSMENT — PAIN DESCRIPTION - DESCRIPTORS: DESCRIPTORS: SQUEEZING;OTHER (COMMENT)

## 2024-05-09 ASSESSMENT — PAIN - FUNCTIONAL ASSESSMENT
PAIN_FUNCTIONAL_ASSESSMENT: PREVENTS OR INTERFERES SOME ACTIVE ACTIVITIES AND ADLS
PAIN_FUNCTIONAL_ASSESSMENT: 0-10

## 2024-05-09 NOTE — H&P
Pain Pre-Op H&P Note    Lis Keen MD    HPI: Radha Torres  presents with back pain. Wishes to proceed with SI joint injection.    Past Medical History:   Diagnosis Date    Arthritis     Atrial fibrillation, controlled (HCC)     Hyperlipidemia     Hypertension        Past Surgical History:   Procedure Laterality Date    COLONOSCOPY  1998 & 2009    FIXATION KYPHOPLASTY         History reviewed. No pertinent family history.    Allergies   Allergen Reactions    Other Itching     6-0 suture (cat gut)     Neurontin [Gabapentin] Nausea And Vomiting     Light headed    Norvasc [Amlodipine Besylate] Nausea And Vomiting     Light headed    Ultram [Tramadol Hcl] Nausea And Vomiting     Light headed         Current Outpatient Medications:     ELIQUIS 2.5 MG TABS tablet, Take 1 tablet by mouth 2 times daily, Disp: , Rfl:     carvedilol (COREG) 6.25 MG tablet, Take 1 tablet by mouth 2 times daily (with meals), Disp: , Rfl:     hydrALAZINE (APRESOLINE) 50 MG tablet, Take 1 tablet by mouth 3 times daily, Disp: , Rfl:     lisinopril (PRINIVIL;ZESTRIL) 40 MG tablet, Take 1 tablet by mouth, Disp: , Rfl:     meloxicam (MOBIC) 7.5 MG tablet, Take 1 tablet by mouth 3 times daily, Disp: , Rfl:     spironolactone (ALDACTONE) 25 MG tablet, Take 1 tablet by mouth daily Half a tablet once a day, Disp: , Rfl:     NIFEdipine (PROCARDIA XL) 30 MG extended release tablet, Take 1 tablet by mouth every morning, Disp: , Rfl:     Turmeric 400 MG CAPS, Take by mouth Unknown dosage, Disp: , Rfl:     Omega-3 Fatty Acids (FISH OIL) 1200 MG CAPS, Take 2,400 mg by mouth in the morning and at bedtime, Disp: , Rfl:     bumetanide (BUMEX) 0.5 MG tablet, Take 1 tablet by mouth daily, Disp: , Rfl:     Calcium Carb-Cholecalciferol (CALCIUM 1000 + D) 1000-800 MG-UNIT TABS, Take by mouth (Patient not taking: Reported on 3/25/2024), Disp: , Rfl:     Multiple Vitamin (MULTI-VITAMIN DAILY) TABS, Take by mouth, Disp: , Rfl:     Cholecalciferol (VITAMIN D3)

## 2024-05-09 NOTE — DISCHARGE INSTRUCTIONS
You have received a sedative/anesthetic therefore you should not consume any alcoholic beverages for 24 hours.  Do not drive or operate machinery for 24 hours.  Do not take a tub bath for 72 hours after procedure (this includes hot tubs).  You may shower, but avoid hot water to injection site.   Avoid strenuous activity TODAY especially if you experience dizziness.   Remove band-aid the next day.    Wash off any residual iodine 24 hours from today.   Do not use heat, heating pad, or any other heating device over the injection site for 3 days after the procedure.    If you experience pain after your procedure, you may continue with your current pain medication as prescribed.  (DO NOT INCREASE YOUR PAIN MEDICATION WITHOUT TALKING TO DOCTOR)  Soreness and pain at injection site is common, may use ice to reduce soreness.    What to expect:  You may experience facial flushing, night sweats and irritability.  Other common steroid related side effects are increased appetite, mood elevation, insomnia and fluid retention.  These effects usually subside in a few days.  Steroids used in epidural injections may cause muscle spasms for a few days.  If you are diabetic, your blood sugar may be elevated after your procedure due to the steroids.  You will need to monitor your blood sugar more closely while going through a series of injections (Check blood sugar at meals and bedtime for 5 days).  You may require adjustment in your diabetic medications, contact your PCP office to discuss.    Call [facility] at [phone] if you experience:   Fever, chills or temperature over 100    Vomiting, headache, persistent stiff neck, nausea or blurred vision   Difficulty urinating or unable to urinate within 8 hours   Increase in weakness, numbness or loss of function of limbs  Increased redness, swelling or drainage at the injection site

## 2024-05-09 NOTE — PROGRESS NOTES
Writer talked to Dr Torres office regarding elevated blood pressure. Pt is not exhibit any s/s of stroke. No shortness of breathe or chest pain. The office stated to have patient go home and will call patient later today. Dr Keen aware. Procedure was cancelled today. Patient understood if having any s/s to go straight to ER.

## 2024-09-10 ENCOUNTER — OFFICE VISIT (OUTPATIENT)
Dept: CARDIOLOGY | Facility: CLINIC | Age: 89
End: 2024-09-10
Payer: MEDICARE

## 2024-09-10 ENCOUNTER — PATIENT ROUNDING (BHMG ONLY) (OUTPATIENT)
Dept: CARDIOLOGY | Facility: CLINIC | Age: 89
End: 2024-09-10
Payer: MEDICARE

## 2024-09-10 VITALS
SYSTOLIC BLOOD PRESSURE: 162 MMHG | BODY MASS INDEX: 25.45 KG/M2 | WEIGHT: 134.8 LBS | HEART RATE: 58 BPM | HEIGHT: 61 IN | OXYGEN SATURATION: 98 % | DIASTOLIC BLOOD PRESSURE: 68 MMHG

## 2024-09-10 DIAGNOSIS — I50.33 ACUTE ON CHRONIC DIASTOLIC CHF (CONGESTIVE HEART FAILURE): ICD-10-CM

## 2024-09-10 DIAGNOSIS — I10 ESSENTIAL HYPERTENSION: ICD-10-CM

## 2024-09-10 DIAGNOSIS — I48.0 AF (PAROXYSMAL ATRIAL FIBRILLATION): ICD-10-CM

## 2024-09-10 PROBLEM — I34.0 NONRHEUMATIC MITRAL VALVE REGURGITATION: Status: ACTIVE | Noted: 2024-09-10

## 2024-09-10 PROCEDURE — 93000 ELECTROCARDIOGRAM COMPLETE: CPT | Performed by: INTERNAL MEDICINE

## 2024-09-10 PROCEDURE — 99204 OFFICE O/P NEW MOD 45 MIN: CPT | Performed by: INTERNAL MEDICINE

## 2024-09-10 PROCEDURE — 1159F MED LIST DOCD IN RCRD: CPT | Performed by: INTERNAL MEDICINE

## 2024-09-10 PROCEDURE — 1160F RVW MEDS BY RX/DR IN RCRD: CPT | Performed by: INTERNAL MEDICINE

## 2024-09-10 RX ORDER — HYDRALAZINE HYDROCHLORIDE 50 MG/1
1 TABLET, FILM COATED ORAL 3 TIMES DAILY
COMMUNITY
Start: 2024-06-21

## 2024-09-10 RX ORDER — AMOXICILLIN 500 MG
1200 CAPSULE ORAL DAILY
COMMUNITY

## 2024-09-10 RX ORDER — LISINOPRIL 40 MG/1
1 TABLET ORAL EVERY MORNING
COMMUNITY
Start: 2024-09-06

## 2024-09-10 RX ORDER — CARVEDILOL 6.25 MG/1
6.25 TABLET ORAL 2 TIMES DAILY WITH MEALS
COMMUNITY
Start: 2024-09-06

## 2024-09-10 RX ORDER — APIXABAN 2.5 MG/1
2.5 TABLET, FILM COATED ORAL EVERY 12 HOURS SCHEDULED
COMMUNITY
Start: 2024-08-26

## 2024-09-10 RX ORDER — GABAPENTIN 100 MG/1
100 CAPSULE ORAL 3 TIMES DAILY
COMMUNITY
Start: 2024-08-23

## 2024-09-10 RX ORDER — BUMETANIDE 1 MG/1
1 TABLET ORAL DAILY
COMMUNITY
Start: 2024-07-29

## 2024-09-10 RX ORDER — MELOXICAM 7.5 MG/1
1 TABLET ORAL DAILY
COMMUNITY
Start: 2024-08-22

## 2024-09-10 RX ORDER — DIPHENOXYLATE HYDROCHLORIDE AND ATROPINE SULFATE 2.5; .025 MG/1; MG/1
1 TABLET ORAL DAILY
COMMUNITY

## 2024-09-10 RX ORDER — NIFEDIPINE 30 MG/1
30 TABLET, EXTENDED RELEASE ORAL DAILY
COMMUNITY
Start: 2024-06-28

## 2024-09-10 NOTE — PROGRESS NOTES
[facility]  Office Note  Khushbu Bledsoe  3/17/1934  [address] [address]     VISIT DATE:  09/10/24    PCP: Ana Ross MD  [address]  [place] [address]    CC: Atrial fibrillation, paroxysmal  Chief Complaint   Patient presents with    Hypertension     BLE swelling, worse in left       PROBLEM LIST:    Echocardiogram February 2024 with mild to moderate mitral regurgitation grade 2 diastolic dysfunction  Hypertension  Hyperlipidemia  Paroxysmal atrial fibrillation    Allergies  No Known Allergies    Current Medications    Current Outpatient Medications:     bumetanide (BUMEX) 1 MG tablet, Take 1 tablet by mouth Daily., Disp: , Rfl:     carvedilol (COREG) 6.25 MG tablet, Take 1 tablet by mouth 2 (Two) Times a Day With Meals., Disp: , Rfl:     Cholecalciferol 125 MCG (5000 UT) tablet, Take 1 tablet by mouth Daily., Disp: , Rfl:     Eliquis 2.5 MG tablet tablet, Take 1 tablet by mouth Every 12 (Twelve) Hours., Disp: , Rfl:     gabapentin (NEURONTIN) 100 MG capsule, Take 1 capsule by mouth 3 (Three) Times a Day., Disp: , Rfl:     hydrALAZINE (APRESOLINE) 50 MG tablet, Take 1 tablet by mouth 3 (Three) Times a Day., Disp: , Rfl:     lisinopril (PRINIVIL,ZESTRIL) 40 MG tablet, Take 1 tablet by mouth Every Morning., Disp: , Rfl:     meloxicam (MOBIC) 7.5 MG tablet, Take 1 tablet by mouth Daily., Disp: , Rfl:     Multiple Vitamins-Minerals (PRESERVISION AREDS 2 PO), Take 1 capsule by mouth 2 (Two) Times a Day., Disp: , Rfl:     multivitamin (Multi-Vitamin Daily) tablet tablet, Take 1 tablet by mouth Daily., Disp: , Rfl:     NIFEdipine XL (PROCARDIA XL) 30 MG 24 hr tablet, Take 1 tablet by mouth Daily., Disp: , Rfl:     Omega-3 Fatty Acids (fish oil) 1200 MG capsule capsule, Take 1 capsule by mouth Daily., Disp: , Rfl:     spironolactone (ALDACTONE) 12.5 MG tablet half tablet, Take 1 half tablet by mouth Daily., Disp: , Rfl:      History of Present  "Illness   Khushbu Bledsoe is a 89 y.o. year old female who presents for consult from Ana Ross MD for evaluation of issues.  This is a very pleasant 89-year-old female recently moved to the area that presents here to establish cardiac care.  Patient is doing relatively at this time.  Patient is limited because of orthopedic issues mostly her back however does use a walker to get around.  Patient does occasionally have some shortness of breath however this been chronic for years she states.  Patient denies any chest pain pressure discomfort.  No palpitations.  No real lower extremity swelling.  Patient does wear compression stockings.  Patient denies any paroxysmal nocturnal dyspnea orthopnea.  No palpitations.  Patient's blood pressure at home is usually 130s 140s over 80s.  Patient has had shingles multiple times and recently moved to the area to be cared for by family members.    Pt denies any chest pain, dyspnea at rest, dyspnea on exertion, orthopnea, PND, palpitations, lower extremity edema, or claudication. Pt denies history of CHF, DVT, PE, MI, CVA, TIA, or rheumatic fever.     SOCIAL HX  Social History     Socioeconomic History    Marital status:    Tobacco Use    Smoking status: Never    Smokeless tobacco: Never   Vaping Use    Vaping status: Never Used   Substance and Sexual Activity    Alcohol use: Not Currently    Drug use: Never    Sexual activity: Not Currently     Partners: Male       FAMILY HX  Family History   Problem Relation Age of Onset    Heart attack Brother        Vitals:    09/10/24 1008   BP: 162/68   BP Location: Right arm   Patient Position: Sitting   Cuff Size: Adult   Pulse: 58   SpO2: 98%   Weight: 61.1 kg (134 lb 12.8 oz)   Height: 154.9 cm (61\")     Body mass index is 25.47 kg/m².     PHYSICAL EXAMINATION:  Vitals and nursing note reviewed.   Constitutional:       Appearance: Healthy appearance. Not in distress.   Eyes:      Conjunctiva/sclera: Conjunctivae normal.    " "  Pupils: Pupils are equal, round, and reactive to light.   HENT:      Nose: Nose normal.    Mouth/Throat:      Pharynx: Oropharynx is clear.   Neck:      Vascular: JVD normal.   Pulmonary:      Effort: Pulmonary effort is normal.      Breath sounds: Normal breath sounds. No wheezing. No rhonchi. No rales.   Cardiovascular:      PMI at left midclavicular line. Normal rate. Regular rhythm. Normal S1. Normal S2.       Murmurs: There is no murmur.      No gallop.  No click. No rub.   Pulses:     Intact distal pulses.   Abdominal:      General: Bowel sounds are normal.      Palpations: Abdomen is soft.      Tenderness: There is no abdominal tenderness.   Musculoskeletal: Normal range of motion.         General: No tenderness.      Cervical back: Normal range of motion. Skin:     General: Skin is warm and dry.   Neurological:      General: No focal deficit present.      Mental Status: Alert and oriented to person, place and time.      Cranial Nerves: Cranial nerves 2-12 are intact.         Diagnostic Data:  No results found for: \"CHLPL\", \"TRIG\", \"HDL\", \"LDLDIRECT\"  Lab Results   Component Value Date    BUN 27 04/15/2023    K 3.9 06/02/2023     No results found for: \"HGBA1C\"  Lab Results   Component Value Date    HGB 11.4 (L) 07/25/2024         ECG 12 Lead    Date/Time: 9/10/2024 10:42 AM  Performed by: Van Ch MD    Authorized by: Van Ch MD  Comparison: not compared with previous ECG   Previous ECG: no previous ECG available  Rhythm: sinus rhythm  Conduction: right bundle branch block  ST Segments: ST segments normal  T Waves: T waves normal    Clinical impression: abnormal EKG          Advance Care Planning   ACP discussion was declined by the patient. Patient does not have an advance directive, declines further assistance.         ASSESSMENT:  Diagnoses and all orders for this visit:    1. AF (paroxysmal atrial fibrillation)    2. Acute on chronic diastolic CHF (congestive heart " failure)    3. Essential hypertension        PLAN:    Continue current cardiac medical therapy  Will repeat echocardiogram in 1 to 2 years for follow-up of mitral regurgitation  Leg elevation compression stockings recommended  Patient to continue to check blood pressure at home as home log shows the blood pressure is within normal limits for her age  Dyspnea is chronic and has been evaluated in the past outside cardiology thus will hold on repeat evaluation  No changes to be made to medical therapy today  Return in about 6 months (around 3/10/2025).     Van Ch MD

## 2024-09-10 NOTE — PROGRESS NOTES
September 10, 2024    Hello, may I speak with Khushbu Bledsoe? Yes.    My name is Telma BOUDREAUX.      I am  with [facility] CARDIOLOG[facility]  [address]  [place] [facility] [address] [phone].    Before we get started may I verify your date of birth? 3/17/1934, Yes, correct.    I am calling to officially welcome you to our practice and ask about your recent visit. Is this a good time to talk? yes    Tell me about your visit with us. What things went well?  Everything.       We're always looking for ways to make our patients' experiences even better. Do you have recommendations on ways we may improve?  no    Overall were you satisfied with your first visit to our practice? yes       I appreciate you taking the time to speak with me today. Is there anything else I can do for you? no      Thank you, and have a great day.

## 2024-09-18 ENCOUNTER — OFFICE VISIT (OUTPATIENT)
Dept: INTERNAL MEDICINE | Facility: CLINIC | Age: 89
End: 2024-09-18
Payer: MEDICARE

## 2024-09-18 VITALS
HEIGHT: 61 IN | HEART RATE: 62 BPM | WEIGHT: 135 LBS | RESPIRATION RATE: 20 BRPM | BODY MASS INDEX: 25.49 KG/M2 | OXYGEN SATURATION: 99 % | SYSTOLIC BLOOD PRESSURE: 126 MMHG | TEMPERATURE: 97.1 F | DIASTOLIC BLOOD PRESSURE: 61 MMHG

## 2024-09-18 DIAGNOSIS — I10 ESSENTIAL HYPERTENSION: ICD-10-CM

## 2024-09-18 DIAGNOSIS — R26.89 LOSS OF BALANCE: ICD-10-CM

## 2024-09-18 DIAGNOSIS — M54.16 LUMBAR RADICULOPATHY: Primary | ICD-10-CM

## 2024-09-18 DIAGNOSIS — M48.061 SPINAL STENOSIS OF LUMBAR REGION WITHOUT NEUROGENIC CLAUDICATION: ICD-10-CM

## 2024-09-18 DIAGNOSIS — I48.0 AF (PAROXYSMAL ATRIAL FIBRILLATION): ICD-10-CM

## 2024-09-18 DIAGNOSIS — I50.33 ACUTE ON CHRONIC DIASTOLIC CHF (CONGESTIVE HEART FAILURE): ICD-10-CM

## 2024-09-18 DIAGNOSIS — R54 AGE-RELATED PHYSICAL DEBILITY: ICD-10-CM

## 2024-09-18 PROCEDURE — G2211 COMPLEX E/M VISIT ADD ON: HCPCS | Performed by: STUDENT IN AN ORGANIZED HEALTH CARE EDUCATION/TRAINING PROGRAM

## 2024-09-18 PROCEDURE — 99204 OFFICE O/P NEW MOD 45 MIN: CPT | Performed by: STUDENT IN AN ORGANIZED HEALTH CARE EDUCATION/TRAINING PROGRAM

## 2024-09-18 RX ORDER — MELOXICAM 7.5 MG/1
7.5 TABLET ORAL DAILY
Qty: 90 TABLET | Refills: 1 | Status: SHIPPED | OUTPATIENT
Start: 2024-09-18 | End: 2024-09-19

## 2024-09-19 RX ORDER — MELOXICAM 7.5 MG/1
7.5 TABLET ORAL DAILY
Qty: 90 TABLET | Refills: 1 | Status: SHIPPED | OUTPATIENT
Start: 2024-09-19

## 2024-10-03 ENCOUNTER — TELEPHONE (OUTPATIENT)
Dept: NEUROSURGERY | Facility: CLINIC | Age: 89
End: 2024-10-03
Payer: MEDICARE

## 2024-10-03 NOTE — TELEPHONE ENCOUNTER
MRI disc of Lumbar Spine from [facility] received on 10/3/2024.  Given to [employer] to be uploaded to patients chart.

## 2024-10-07 ENCOUNTER — TREATMENT (OUTPATIENT)
Dept: PHYSICAL THERAPY | Facility: CLINIC | Age: 89
End: 2024-10-07
Payer: MEDICARE

## 2024-10-07 ENCOUNTER — OFFICE VISIT (OUTPATIENT)
Dept: NEUROSURGERY | Facility: CLINIC | Age: 89
End: 2024-10-07
Payer: MEDICARE

## 2024-10-07 VITALS — HEIGHT: 61 IN | TEMPERATURE: 97.7 F | BODY MASS INDEX: 25.11 KG/M2 | WEIGHT: 133 LBS

## 2024-10-07 DIAGNOSIS — R26.89 BALANCE PROBLEM: Primary | ICD-10-CM

## 2024-10-07 DIAGNOSIS — M54.50 CHRONIC RIGHT-SIDED LOW BACK PAIN, UNSPECIFIED WHETHER SCIATICA PRESENT: Primary | ICD-10-CM

## 2024-10-07 DIAGNOSIS — G89.29 CHRONIC RIGHT-SIDED LOW BACK PAIN, UNSPECIFIED WHETHER SCIATICA PRESENT: Primary | ICD-10-CM

## 2024-10-07 DIAGNOSIS — M48.061 SPINAL STENOSIS, LUMBAR REGION WITHOUT NEUROGENIC CLAUDICATION: ICD-10-CM

## 2024-10-07 DIAGNOSIS — M54.50 LUMBAR PAIN: ICD-10-CM

## 2024-10-07 PROCEDURE — 1160F RVW MEDS BY RX/DR IN RCRD: CPT

## 2024-10-07 PROCEDURE — 97161 PT EVAL LOW COMPLEX 20 MIN: CPT | Performed by: PHYSICAL THERAPIST

## 2024-10-07 PROCEDURE — 99204 OFFICE O/P NEW MOD 45 MIN: CPT

## 2024-10-07 PROCEDURE — 1159F MED LIST DOCD IN RCRD: CPT

## 2024-10-07 NOTE — PROGRESS NOTES
Physical Therapy Initial Evaluation and Plan of Care   [address] [place] [address]      Patient: Khushbu Bledsoe   : 3/17/1934  Diagnosis/ICD-10 Code:  Balance problem [R26.89]  Referring practitioner: Ana Ross MD    Subjective Evaluation    History of Present Illness  Date of onset: 2024  Mechanism of injury: Pt reports that she has LE weakness and stiffness in the morning that gets better with activity. Pt reports that she has back pain that has been going on for years. Pt reports having injections in the past that helped minimally. Pt's son reports that pt had shingles back in May this year. Pt states that she became debilitated during shingles and moved into an assisted living complex with occ and physical therapy which helped a lot. Pt reports that she moved in with her son and his wife after she became strong enough. Pt is taking meloxicam and gabapentin.       Patient Occupation: Retired Pain  Current pain ratin  At worst pain rating: 10  Aggravating factors: ambulation, lifting, movement, standing and stairs  Progression: worsening    Social Support  Lives in: one-story house  Lives with: adult children    Diagnostic Tests  MRI studies: abnormal    Treatments  Previous treatment: injection treatment and medication  Patient Goals  Patient goals for therapy: decreased pain, increased motion and increased strength           Objective          Palpation   Left   Tenderness of the adductor brevis, adductor longus and adductor mame.     Right Tenderness of the adductor brevis, adductor longus, adductor mame, iliopsoas and TFL.     Tenderness     Left Hip   Tenderness in the greater trochanter.     Right Hip   Tenderness in the greater trochanter.   Left Knee   Tenderness in the ITB.     Right Knee   Tenderness in the ITB.     Strength/Myotome Testing     Left Hip   Planes of Motion   Flexion: 4-  Abduction: 3+    Right Hip   Planes of Motion   Flexion:  4-  Abduction: 3+    Left Knee   Quadriceps contraction: good    Right Knee   Quadriceps contraction: fair    Functional Assessment     Comments  TUG: 15.65 seconds  5x sit to stand: 35.85 seconds          Assessment & Plan       Assessment  Impairments: abnormal gait, abnormal muscle firing, abnormal or restricted ROM, activity intolerance, impaired balance, impaired physical strength, lacks appropriate home exercise program and pain with function   Functional limitations: lifting, walking, uncomfortable because of pain and standing   Assessment details: Patient is a 89 year old female who comes to physical therapy with balance deficits and low back pain. Signs and symptoms are consistent with hip weakness with iliopsoas irritation resulting in pain, decreased ROM, decreased strength, and inability to perform all essential functional activities. Pt will benefit from skilled PT services to address the above issues.       Prognosis: fair    Goals  Plan Goals: SHORT TERM GOALS:     2 weeks  1. Pt independent with HEP  2. Pt to demonstrate trunk AROM 50-75% of expected norms to allow for improved ability to perform ADL's  3. Pt to demonstrate bilateral hip strength 4/5 in all planes to improved stability of the core/trunk     LONG TERM GOALS:   6 weeks  1. Pt to demonstrate trunk AROM % of expected norms to allow for improved ability to perform functional activities  2. Pt to demonstrate ability to perform full functional squat with good form and without increased pain in the low back   3. Pt to be able to perform TUG and 5x sit to  <12 and <15 seconds  4. Pt to report cessation of pain/numbness/tingling into the right leg to show decreased nerve compression        Plan  Therapy options: will be seen for skilled therapy services  Planned modality interventions: cryotherapy and thermotherapy (hydrocollator packs)  Planned therapy interventions: abdominal trunk stabilization, balance/weight-bearing  training, fine motor coordination training, flexibility, functional ROM exercises, home exercise program, joint mobilization, manual therapy, motor coordination training, neuromuscular re-education, soft tissue mobilization, strengthening, stretching, therapeutic activities and spinal/joint mobilization  Frequency: 2x week  Duration in weeks: 6  Treatment plan discussed with: patient and family      Timed Treatment:  Manual Therapy:         mins  10444;  Therapeutic Exercise:         mins  64863;     Neuromuscular Carli:        mins  50265;    Therapeutic Activity:          mins  14996;     Gait Training:           mins  98584;     Ultrasound:          mins  52759;    Electrical Stimulation:         mins  90662 ( );  Dry Needling          mins self-pay  Iontophoresis          mins 51231    Untimed Treatments:  Electrical Stimulation:         mins  02906 ( );  Dry Needling:                     mins  Ultrasound:                         mins  57794;      Timed Treatment:      mins   Total Treatment:     48   mins    PT SIGNATURE: [place] License: [id number]  DATE TREATMENT INITIATED: 10/7/2024    Initial Certification  Certification Period: 1/4/2025  I certify that the therapy services are furnished while this patient is under my care.  The services outlined above are required by this patient, and will be reviewed every 90 days.     PHYSICIAN: Ana Ross MD      DATE:     Please sign and return via fax to [phone].. Thank you, [facility].

## 2024-10-07 NOTE — PROGRESS NOTES
Name: Khushbu Bledsoe    : 3/17/1934     MRN: [id number]     Primary Care Provider: Ana Ross MD    Chief Complaint  Back Pain and Leg Pain (RLE, anterior thigh, does not go below the knee)      History of Present Illness:  Khuhsbu Bledsoe is a 89 y.o. female who presents to the clinic today for evaluation of chronic low back and right leg pain.  Patient states she has been dealing with right-sided low back pain for the last 4 to 5 years that has been progressively worsening.  She also did describes pain radiating down her right anterior thigh to about her mid thigh.  Denies any pain radiating further down her leg or left leg symptoms.  Denies any numbness or tingling, weakness, bowel or bladder issues.  She states she was previously evaluated in [place] by an orthopedic surgeon who recommended right hip replacement which she did not want to pursue.  She also had injections in her back in the past without much benefit.  She is currently in physical therapy.    PMHX  Allergies:  No Known Allergies  Medications    Current Outpatient Medications:     bumetanide (BUMEX) 1 MG tablet, Take 1 tablet by mouth Daily., Disp: , Rfl:     carvedilol (COREG) 6.25 MG tablet, Take 1 tablet by mouth 2 (Two) Times a Day With Meals., Disp: , Rfl:     Cholecalciferol 125 MCG (5000 UT) tablet, Take 1 tablet by mouth Daily., Disp: , Rfl:     Eliquis 2.5 MG tablet tablet, Take 1 tablet by mouth Every 12 (Twelve) Hours., Disp: , Rfl:     gabapentin (NEURONTIN) 100 MG capsule, Take 1 capsule by mouth 3 (Three) Times a Day., Disp: , Rfl:     hydrALAZINE (APRESOLINE) 50 MG tablet, Take 1 tablet by mouth 3 (Three) Times a Day., Disp: , Rfl:     lisinopril (PRINIVIL,ZESTRIL) 40 MG tablet, Take 1 tablet by mouth Every Morning., Disp: , Rfl:     meloxicam (MOBIC) 7.5 MG tablet, Take 1 tablet by mouth Daily., Disp: 90 tablet, Rfl: 1    Multiple Vitamins-Minerals (PRESERVISION AREDS 2 PO), Take 1 capsule by mouth 2 (Two) Times a Day.,  Disp: , Rfl:     multivitamin (Multi-Vitamin Daily) tablet tablet, Take 1 tablet by mouth Daily., Disp: , Rfl:     NIFEdipine XL (PROCARDIA XL) 30 MG 24 hr tablet, Take 1 tablet by mouth Daily., Disp: , Rfl:     Omega-3 Fatty Acids (fish oil) 1200 MG capsule capsule, Take 1 capsule by mouth Daily., Disp: , Rfl:     spironolactone (ALDACTONE) 12.5 MG tablet half tablet, Take 1 half tablet by mouth Daily., Disp: , Rfl:   Past Medical History:  Past Medical History:   Diagnosis Date    AF (paroxysmal atrial fibrillation) 09/10/2024    Arthritis 2010    Cervical disc disorder 2010    CHF (congestive heart failure)     Coronary artery disease     Hypertension     Low back pain 2010    Lumbosacral disc disease 2010    Thoracic disc disorder 2010     Past Surgical History:  Past Surgical History:   Procedure Laterality Date    BACK SURGERY  2010    SPINAL FUSION  2010     Social Hx:  Social History     Tobacco Use    Smoking status: Never    Smokeless tobacco: Never   Vaping Use    Vaping status: Never Used   Substance Use Topics    Alcohol use: Not Currently    Drug use: Never     Family Hx:  Family History   Problem Relation Age of Onset    Heart attack Brother      Review of Systems:        Review of Systems   Constitutional:  Negative for activity change, appetite change, chills, diaphoresis, fatigue, fever and unexpected weight change.   HENT:  Negative for congestion, dental problem, drooling, ear discharge, ear pain, facial swelling, hearing loss, mouth sores, nosebleeds, postnasal drip, rhinorrhea, sinus pressure, sinus pain, sneezing, sore throat, tinnitus, trouble swallowing and voice change.    Eyes:  Negative for photophobia, pain, discharge, redness, itching and visual disturbance.   Respiratory:  Negative for apnea, cough, choking, chest tightness, shortness of breath, wheezing and stridor.    Cardiovascular:  Negative for chest pain, palpitations and leg swelling.   Gastrointestinal:  Negative for abdominal  "distention, abdominal pain, anal bleeding, blood in stool, constipation, diarrhea, nausea, rectal pain and vomiting.   Endocrine: Negative for cold intolerance, heat intolerance, polydipsia, polyphagia and polyuria.   Genitourinary:  Negative for decreased urine volume, difficulty urinating, dysuria, enuresis, flank pain, frequency, genital sores, hematuria and urgency.   Musculoskeletal:  Positive for back pain. Negative for arthralgias, gait problem, joint swelling, myalgias, neck pain and neck stiffness.   Skin:  Negative for color change, pallor, rash and wound.   Allergic/Immunologic: Negative for environmental allergies, food allergies and immunocompromised state.   Neurological:  Negative for dizziness, tremors, seizures, syncope, facial asymmetry, speech difficulty, weakness, light-headedness, numbness and headaches.   Hematological:  Negative for adenopathy. Does not bruise/bleed easily.   Psychiatric/Behavioral:  Negative for agitation, behavioral problems, confusion, decreased concentration, dysphoric mood, hallucinations, self-injury, sleep disturbance and suicidal ideas. The patient is not nervous/anxious and is not hyperactive.    All other systems reviewed and are negative.         Vital Signs: Temp 97.7 °F (36.5 °C) (Infrared)   Ht 154.9 cm (61\")   Wt 60.3 kg (133 lb)   BMI 25.13 kg/m²      Physical Exam  Awake, alert and oriented x 3  Speech f/c  Opens eyes spontaneously  Pupils 3 mm rx bilaterally  Extraocular muscles intact bilaterally  Face symmetric bilaterally  Tongue midline  5/5 in all 4 extremities  No clonus bilaterally  External rotation of the right hip  reproduces patient's anterior thigh pain      Social History    Tobacco Use      Smoking status: Never      Smokeless tobacco: Never       Tobacco Use: Low Risk  (10/7/2024)    Patient History     Smoking Tobacco Use: Never     Smokeless Tobacco Use: Never     Passive Exposure: Not on file           [facility] Fall Risk Assessment was " completed, and patient is at LOW risk for falls.Assessment completed on:9/18/2024      Diagnostic Studies: (All imaging is independently reviewed unless stated otherwise.)  MRI lumbar spine from 2/27/2024 shows degenerative changes throughout her lumbar spine.  There is mild to moderate central canal stenosis at L3-4 with severe right and mild left neuroforaminal narrowing.  She also has severe left and moderate neuroforaminal narrowing at L4-5 along with a grade 1 anterolisthesis.      Assessment/Plan    Diagnoses and all orders for this visit:    1. Chronic right-sided low back pain, unspecified whether sciatica present (Primary)  -     EMG & Nerve Conduction Test  -     Ambulatory Referral to Physical Therapy for Evaluation & Treatment    2. Spinal stenosis, lumbar region without neurogenic claudication  -     EMG & Nerve Conduction Test         This is a 89-year-old female who presents for evaluation of low back and right leg pain.  MRI lumbar spine does show severe right foraminal narrowing at L3-4 that could correlate well with her symptoms however she also has reproducible pain with hip range of motion.  I am unsure whether her pain is coming from her back or her hip.  Patient is not interested in any surgery whether that is on her hip or her low back which I would not recommend given her age and the fact that she is neurologically intact on exam.  I would like to get an EMG study for further evaluation of what is causing her right leg pain.  If there is radiculopathy found on EMG, she would likely benefit from an TYSON.  If no radiculopathy is found, she would likely benefit from hip injections.  I will call the patient with her EMG results to discuss next treatment options. Patient encouraged to contact us if she has any changes in her condition or any concerns.    Any copied data from previous notes included in the (1) HPI, (2) PE, (3) MDM and/or Assessment and Plan has been reviewed and accurate as of  10/07/24.    Danyelle Villanueva PA-C  10/07/24

## 2024-10-11 ENCOUNTER — TREATMENT (OUTPATIENT)
Dept: PHYSICAL THERAPY | Facility: CLINIC | Age: 89
End: 2024-10-11
Payer: MEDICARE

## 2024-10-11 DIAGNOSIS — M54.50 LUMBAR PAIN: ICD-10-CM

## 2024-10-11 DIAGNOSIS — R26.89 BALANCE PROBLEM: Primary | ICD-10-CM

## 2024-10-11 PROCEDURE — 97140 MANUAL THERAPY 1/> REGIONS: CPT | Performed by: PHYSICAL THERAPIST

## 2024-10-11 PROCEDURE — 97112 NEUROMUSCULAR REEDUCATION: CPT | Performed by: PHYSICAL THERAPIST

## 2024-10-11 PROCEDURE — 97110 THERAPEUTIC EXERCISES: CPT | Performed by: PHYSICAL THERAPIST

## 2024-10-11 NOTE — PROGRESS NOTES
Physical Therapy Daily Treatment Note      Visit #: 2    Khushbu Bledsoe reports 0/10 pain today at rest.  Pt reports that her R leg has been hurting when she up and moving. Pt states that she has been doing her HEP and it has not been bothering her.         Objective Pt present to PT today with no distress at rest.     Pt with improve pain in the R leg and hip with distraction of the hip joint.     Pt with improved pain with walking following activities today.       See Exercise, Manual, and Modality Logs for complete treatment.     Assessment/Plan  Pt continues to have low back pain with R hip pain. Pt to continue with PT to help improve R hip mobility, function, and strength. Pt did well with WB activities today and will progress next session as tolerated.       Progress per Plan of Care      Visit Diagnosis:    ICD-10-CM ICD-9-CM   1. Balance problem  R26.89 781.99   2. Lumbar pain  M54.50 724.2              Timed Treatment:  Manual Therapy:    12     mins  66465;  Therapeutic Exercise:    18     mins  07460;     Neuromuscular Carli:    12    mins  81497;    Therapeutic Activity:          mins  91821;     Gait Training:           mins  13624;     Ultrasound:          mins  62274;    Electrical Stimulation:         mins  78576 ( );  Dry Needling          mins self-pay  Iontophoresis          mins 49546    Untimed Treatments:  Electrical Stimulation:         mins  85100 (MC );  Dry Needling:                     mins  Ultrasound:                         mins  78060;        Timed Treatment:   42   mins   Total Treatment:     42   mins    Javid Campoverde, PT  Physical Therapist

## 2024-10-14 ENCOUNTER — TREATMENT (OUTPATIENT)
Dept: PHYSICAL THERAPY | Facility: CLINIC | Age: 89
End: 2024-10-14
Payer: MEDICARE

## 2024-10-14 DIAGNOSIS — M54.50 LUMBAR PAIN: ICD-10-CM

## 2024-10-14 DIAGNOSIS — R26.89 BALANCE PROBLEM: Primary | ICD-10-CM

## 2024-10-14 NOTE — PROGRESS NOTES
Physical Therapy Daily Treatment Note      Visit #: 3    Khushbu Bledsoe reports 0/10 pain today at rest.  Pt reports that she felt good for the rest of the day after PT last week. Pt reports that the pain in the R leg returned and is still bothering her when up and walking.         Objective Pt present to PT today with no distress at rest.     Pt with increased tenderness in the R adductors today which were irritated with hip distraction.     Pt with improved pain with ambulation today following session.       See Exercise, Manual, and Modality Logs for complete treatment.     Assessment/Plan  Pt continues to improved functional mobility and strength of the R hip that is improving pain. Pt to continue with activities at home and in the clinic to increase R pain strength, mobility, and pain free function.       Progress per Plan of Care      Visit Diagnosis:    ICD-10-CM ICD-9-CM   1. Balance problem  R26.89 781.99   2. Lumbar pain  M54.50 724.2              Timed Treatment:  Manual Therapy:    10     mins  76454;  Therapeutic Exercise:    18     mins  83178;     Neuromuscular Carli:    10    mins  63865;    Therapeutic Activity:          mins  25707;     Gait Training:           mins  49112;     Ultrasound:          mins  37623;    Electrical Stimulation:         mins  62625 ( );  Dry Needling          mins self-pay  Iontophoresis          mins 03166    Untimed Treatments:  Electrical Stimulation:         mins  67940 ( );  Dry Needling:                     mins  Ultrasound:                         mins  17883;        Timed Treatment:   38   mins   Total Treatment:     55   mins    Javid Campoverde, PT  Physical Therapist

## 2024-10-16 ENCOUNTER — TREATMENT (OUTPATIENT)
Dept: PHYSICAL THERAPY | Facility: CLINIC | Age: 89
End: 2024-10-16
Payer: MEDICARE

## 2024-10-16 DIAGNOSIS — M54.50 LUMBAR PAIN: ICD-10-CM

## 2024-10-16 DIAGNOSIS — R26.89 BALANCE PROBLEM: Primary | ICD-10-CM

## 2024-10-16 NOTE — PROGRESS NOTES
Physical Therapy Daily Treatment Note      Visit #: 4    Khushbu Bledsoe reports 0/10 pain today at rest.  Pt reports her R leg has been having less pain since Monday although she feels like she has not been walking very well around the house without her RW. Pt reports that her back hurts when she walks without her RW.         Objective Pt present to PT today with no distress at rest.     Pt with no increased pain in the R leg today.     Pt with tenderness in the R adductors and hip flexors with STM today.       See Exercise, Manual, and Modality Logs for complete treatment.     Assessment/Plan  Pt continues to do well with PT with less pain in the R leg although is still having some low back pain and weakness and immobility in the R hip. Pt to continue with PT to help improve R hip strength, mobility, and activity tolerance while increasing functional mobility and balance.             Visit Diagnosis:    ICD-10-CM ICD-9-CM   1. Balance problem  R26.89 781.99   2. Lumbar pain  M54.50 724.2              Timed Treatment:  Manual Therapy:    12     mins  01589;  Therapeutic Exercise:    15     mins  22087;     Neuromuscular Carli:        mins  87863;    Therapeutic Activity:     11     mins  79872;     Gait Training:           mins  46411;     Ultrasound:          mins  88290;    Electrical Stimulation:         mins  59157 ( );  Dry Needling          mins self-pay  Iontophoresis          mins 02268    Untimed Treatments:  Electrical Stimulation:         mins  01475 ( );  Dry Needling:                     mins  Ultrasound:                         mins  56082;        Timed Treatment:   38   mins   Total Treatment:     55   mins    Javid Campoverde, PT  Physical Therapist

## 2024-10-28 ENCOUNTER — TREATMENT (OUTPATIENT)
Dept: PHYSICAL THERAPY | Facility: CLINIC | Age: 89
End: 2024-10-28
Payer: MEDICARE

## 2024-10-28 DIAGNOSIS — R26.89 BALANCE PROBLEM: Primary | ICD-10-CM

## 2024-10-28 DIAGNOSIS — M54.50 LUMBAR PAIN: ICD-10-CM

## 2024-10-28 PROCEDURE — 97110 THERAPEUTIC EXERCISES: CPT | Performed by: PHYSICAL THERAPIST

## 2024-10-28 PROCEDURE — 97140 MANUAL THERAPY 1/> REGIONS: CPT | Performed by: PHYSICAL THERAPIST

## 2024-10-28 PROCEDURE — 97530 THERAPEUTIC ACTIVITIES: CPT | Performed by: PHYSICAL THERAPIST

## 2024-10-28 NOTE — PROGRESS NOTES
Physical Therapy Daily Treatment Note      Visit #: 5    Khushbu Bledsoe reports 0/10 pain today at rest.  Pt states that she did not do her exercises much while on her trip. Pt denies any issues on the trip.         Objective Pt present to PT today with no distress at rest.     Pt with some pain in the low back with R hip flexor stretching today.     Pt with pain in the R hip with R hip internal rotation stretching.       See Exercise, Manual, and Modality Logs for complete treatment.     Assessment/Plan  Pt continues to do well with activities in the clinic for improved R hip mobility and strength. Pt activities unchanged today to avoid too much soreness after session and a week off PT. Pt to continue with PT and progress functional/balance activities next session.       Progress per Plan of Care      Visit Diagnosis:    ICD-10-CM ICD-9-CM   1. Balance problem  R26.89 781.99   2. Lumbar pain  M54.50 724.2              Timed Treatment:  Manual Therapy:    10     mins  49225;  Therapeutic Exercise:    18     mins  43275;     Neuromuscular Carli:        mins  90164;    Therapeutic Activity:     10     mins  04396;     Gait Training:           mins  60392;     Ultrasound:          mins  77517;    Electrical Stimulation:         mins  07860 ( );  Dry Needling          mins self-pay  Iontophoresis          mins 93186    Untimed Treatments:  Electrical Stimulation:         mins  00410 (MC );  Dry Needling:                     mins  Ultrasound:                         mins  05358;        Timed Treatment:   38   mins   Total Treatment:     55   mins    Javid Campoverde, PT  Physical Therapist

## 2024-10-30 ENCOUNTER — TELEPHONE (OUTPATIENT)
Dept: NEUROSURGERY | Facility: CLINIC | Age: 89
End: 2024-10-30

## 2024-10-30 NOTE — TELEPHONE ENCOUNTER
[facility] received a fax that requires your attention. The document has been indexed to the patient’s chart for your review.      Reason for sending: EMG/NCV RPT    Documents Description: EMG/NCV RPT_CAMILLE MARTINO MD [id number]    Name of Sender: CAMILLE MARTINO MD [facility]    Date Indexed: 10/30/24     Notes (if needed):

## 2024-10-31 ENCOUNTER — TREATMENT (OUTPATIENT)
Dept: PHYSICAL THERAPY | Facility: CLINIC | Age: 89
End: 2024-10-31
Payer: MEDICARE

## 2024-10-31 DIAGNOSIS — M54.16 LUMBAR RADICULOPATHY: Primary | ICD-10-CM

## 2024-10-31 DIAGNOSIS — M54.50 LUMBAR PAIN: ICD-10-CM

## 2024-10-31 DIAGNOSIS — R26.89 BALANCE PROBLEM: Primary | ICD-10-CM

## 2024-10-31 NOTE — PROGRESS NOTES
Physical Therapy Daily Treatment Note      Visit #: 6    Khushbu Bledsoe reports 0/10 pain today at rest.  Pt reports that she felt good after last session. Pt reports that she went for her nerve study which showed that most of her symptoms are stemming in the low back. Pt states that she is doing well today.         Objective Pt present to PT today with no distress at rest.     Pt with no increased R leg with activities today.     PT and pt/pt family discussed EMG results.       See Exercise, Manual, and Modality Logs for complete treatment.     Assessment/Plan  Pt continues to do well with activities in the clinic to help improve hip and LE function and pain. Pt to continue with activities in the clinic and will concentrate on lumbar mobility more over the next several visits.       Progress per Plan of Care      Visit Diagnosis:    ICD-10-CM ICD-9-CM   1. Balance problem  R26.89 781.99   2. Lumbar pain  M54.50 724.2              Timed Treatment:  Manual Therapy:    12     mins  77913;  Therapeutic Exercise:    20     mins  22227;     Neuromuscular Carli:    10    mins  30626;    Therapeutic Activity:     12     mins  09227;     Gait Training:           mins  61242;     Ultrasound:          mins  70858;    Electrical Stimulation:         mins  45746 ( );  Dry Needling          mins self-pay  Iontophoresis          mins 12912    Untimed Treatments:  Electrical Stimulation:         mins  11503 (MC );  Dry Needling:                     mins  Ultrasound:                         mins  09684;        Timed Treatment:   54   mins   Total Treatment:     54   mins    Javid Campoverde, PT  Physical Therapist

## 2024-11-01 RX ORDER — APIXABAN 2.5 MG/1
2.5 TABLET, FILM COATED ORAL EVERY 12 HOURS SCHEDULED
Qty: 180 TABLET | Refills: 3 | Status: SHIPPED | OUTPATIENT
Start: 2024-11-01

## 2024-11-05 ENCOUNTER — TREATMENT (OUTPATIENT)
Dept: PHYSICAL THERAPY | Facility: CLINIC | Age: 89
End: 2024-11-05
Payer: MEDICARE

## 2024-11-05 DIAGNOSIS — R26.89 BALANCE PROBLEM: Primary | ICD-10-CM

## 2024-11-05 DIAGNOSIS — M54.50 LUMBAR PAIN: ICD-10-CM

## 2024-11-05 NOTE — PROGRESS NOTES
Physical Therapy Daily Treatment Note      Visit #: 7    Khushbu Bledsoe reports 0/10 pain today at rest.  Pt reports that she continues to have pain in the low back when she is up walking too much without her RW. Pt states that she had more pain in the R quad that day after last session.         Objective Pt present to PT today with no distress at rest.     Pt with tenderness in the R adductors with STM.       See Exercise, Manual, and Modality Logs for complete treatment.     Assessment/Plan  Pt continues to do well with activities in the clinic and is improving functional mobility and strength of the hips and LEs. Pt to continue with PT to help improve back, hip, and LE strength to improve balance and pain free function around the house and community.       Progress per Plan of Care      Visit Diagnosis:    ICD-10-CM ICD-9-CM   1. Balance problem  R26.89 781.99   2. Lumbar pain  M54.50 724.2              Timed Treatment:  Manual Therapy:    13     mins  74519;  Therapeutic Exercise:    20     mins  67044;     Neuromuscular Carli:        mins  15922;    Therapeutic Activity:     10     mins  09488;     Gait Training:           mins  30207;     Ultrasound:          mins  79150;    Electrical Stimulation:         mins  46182 ( );  Dry Needling          mins self-pay  Iontophoresis          mins 71515    Untimed Treatments:  Electrical Stimulation:         mins  47300 (MC );  Dry Needling:                     mins  Ultrasound:                         mins  35745;        Timed Treatment:   43   mins   Total Treatment:     57   mins    Javid Campoverde, PT  Physical Therapist

## 2024-11-07 ENCOUNTER — TREATMENT (OUTPATIENT)
Dept: PHYSICAL THERAPY | Facility: CLINIC | Age: 89
End: 2024-11-07
Payer: MEDICARE

## 2024-11-07 DIAGNOSIS — M54.50 LUMBAR PAIN: ICD-10-CM

## 2024-11-07 DIAGNOSIS — R26.89 BALANCE PROBLEM: Primary | ICD-10-CM

## 2024-11-07 NOTE — PROGRESS NOTES
Physical Therapy Daily Treatment Note      Visit #: 8    Khushbu Bledsoe reports 0/10 pain today at rest.  Pt reports that she has had less pain in the R leg since her last session and walked around the house quite a bit without her walker. Pt states that her back pain keeps her from walking more without her walker.         Objective Pt present to PT today with no distress at rest.     Pt with no increased pain in the back or R LE with activities today.       See Exercise, Manual, and Modality Logs for complete treatment.     Assessment/Plan  Pt continues to do well with activities in the clinic although continues to need input from PT for safety and guidance during activities. Pt to continue with activities to promote strength, functional mobility, and improved pain to help improve daily activities tolerance and quality of life.       Progress per Plan of Care      Visit Diagnosis:    ICD-10-CM ICD-9-CM   1. Balance problem  R26.89 781.99   2. Lumbar pain  M54.50 724.2              Timed Treatment:  Manual Therapy:    10     mins  70682;  Therapeutic Exercise:    15     mins  87347;     Neuromuscular Carli:        mins  58189;    Therapeutic Activity:     13     mins  82537;     Gait Training:           mins  70919;     Ultrasound:          mins  39549;    Electrical Stimulation:         mins  25665 ( );  Dry Needling          mins self-pay  Iontophoresis          mins 74425    Untimed Treatments:  Electrical Stimulation:         mins  55063 ( );  Dry Needling:                     mins  Ultrasound:                         mins  29602;        Timed Treatment:   38   mins   Total Treatment:     59   mins    Javid Campoverde, PT  Physical Therapist

## 2024-11-12 ENCOUNTER — TREATMENT (OUTPATIENT)
Dept: PHYSICAL THERAPY | Facility: CLINIC | Age: 89
End: 2024-11-12
Payer: MEDICARE

## 2024-11-12 DIAGNOSIS — R26.89 BALANCE PROBLEM: Primary | ICD-10-CM

## 2024-11-12 DIAGNOSIS — M54.50 LUMBAR PAIN: ICD-10-CM

## 2024-11-12 NOTE — PROGRESS NOTES
Physical Therapy Daily Treatment Note      Visit #: 9    Khushbu Bledsoe reports 0/10 pain today at rest.  Pt reports that she has been doing pretty well. Pt states that she can only be up for 10-15 mins before her back starts hurting. Pt reports that she can sit down for 2-3 mins and the pain goes away enough to let her get up and do more.         Objective Pt present to PT today with no distress at rest.     Pt with tenderness in the R adductors and hip flexors with STM.    Pt with difficulty with gait prior to R hip distraction which improve pain and quality of gait pattern.       See Exercise, Manual, and Modality Logs for complete treatment.     Assessment/Plan  Pt continues to do well with activities in the clinic to help improve R leg and back pain to help increase functional mobility and balance. Pt to continue with PT to help pain and balance to improve daily activity tolerance.       Progress per Plan of Care      Visit Diagnosis:    ICD-10-CM ICD-9-CM   1. Balance problem  R26.89 781.99   2. Lumbar pain  M54.50 724.2              Timed Treatment:  Manual Therapy:    13     mins  16703;  Therapeutic Exercise:         mins  72018;     Neuromuscular Carli:        mins  50781;    Therapeutic Activity:     10     mins  48363;     Gait Training:           mins  85095;     Ultrasound:          mins  35604;    Electrical Stimulation:         mins  93266 ( );  Dry Needling          mins self-pay  Iontophoresis          mins 49292    Untimed Treatments:  Electrical Stimulation:         mins  57236 (MC );  Dry Needling:                     mins  Ultrasound:                         mins  59702;        Timed Treatment:   23   mins   Total Treatment:     58   mins    Javid Campoverde, PT  Physical Therapist

## 2024-11-15 ENCOUNTER — TREATMENT (OUTPATIENT)
Dept: PHYSICAL THERAPY | Facility: CLINIC | Age: 89
End: 2024-11-15
Payer: MEDICARE

## 2024-11-15 DIAGNOSIS — M54.50 LUMBAR PAIN: ICD-10-CM

## 2024-11-15 DIAGNOSIS — R26.89 BALANCE PROBLEM: Primary | ICD-10-CM

## 2024-11-17 NOTE — PROGRESS NOTES
Physical Therapy Daily Treatment Note      Visit #: 10    Khushbu Bledsoe reports that her R leg felt a little sore after last session but felt better later on. Pt states that she continues to have R low back and leg pain today.         Objective Pt present to PT today with no distress at rest.     Pt with pain in the R hip and leg today with walking during session.     Pt with improvement of pain in the R leg and hip with retro walking and light distraction of the hip.       See Exercise, Manual, and Modality Logs for complete treatment.     Assessment/Plan  Pt continues to have low back pain with R knee arthritis limiting function and mobility causing pain with daily activities. Pt to continue with PT to help improve functional mobility, pain, and balance with daily activities.       Progress per Plan of Care      Visit Diagnosis:    ICD-10-CM ICD-9-CM   1. Balance problem  R26.89 781.99   2. Lumbar pain  M54.50 724.2              Timed Treatment:  Manual Therapy:    13     mins  05188;  Therapeutic Exercise:         mins  07952;     Neuromuscular Carli:        mins  35191;    Therapeutic Activity:     10     mins  99123;     Gait Training:           mins  24917;     Ultrasound:          mins  00992;    Electrical Stimulation:         mins  11647 ( );  Dry Needling          mins self-pay  Iontophoresis          mins 40002    Untimed Treatments:  Electrical Stimulation:         mins  37098 (MC );  Dry Needling:                     mins  Ultrasound:                         mins  67475;        Timed Treatment:   23   mins   Total Treatment:     55   mins    Javid Campoverde, PT  Physical Therapist

## 2024-11-18 ENCOUNTER — TREATMENT (OUTPATIENT)
Dept: PHYSICAL THERAPY | Facility: CLINIC | Age: 89
End: 2024-11-18
Payer: MEDICARE

## 2024-11-18 DIAGNOSIS — M54.50 LUMBAR PAIN: ICD-10-CM

## 2024-11-18 DIAGNOSIS — R26.89 BALANCE PROBLEM: Primary | ICD-10-CM

## 2024-11-18 PROCEDURE — 97110 THERAPEUTIC EXERCISES: CPT | Performed by: PHYSICAL THERAPIST

## 2024-11-18 PROCEDURE — 97140 MANUAL THERAPY 1/> REGIONS: CPT | Performed by: PHYSICAL THERAPIST

## 2024-11-18 PROCEDURE — 97530 THERAPEUTIC ACTIVITIES: CPT | Performed by: PHYSICAL THERAPIST

## 2024-11-18 NOTE — PROGRESS NOTES
Physical Therapy Daily Treatment Note      Visit #: 11    Khushbu Bledsoe reports that her back continues to hurt when she is standing up although the R leg is feeling better recently despite hurting last PT session.         Objective Pt present to PT today with no distress at rest.     Pt required min assist for sit to stand.     Pt with no tenderness today in the R adductors and hip flexors.     Pt with no increased pain in the R leg and back with activities today.     LEFS: 21/80      Short Term Goals (2 weeks)  Pt will be independent with HEP  Pt will be able to perform sit to stand without PT assistance  Pt will be able to ambulate for 200 ft without pain in the R LE or low back with contact guard assist    Long Term Goals (6 weeks)  Pt will be able to perform 5x sit to stand with 15 seconds to show improve strength in the LEs  Pt will be able to perform TUG in <12 seconds to show decreased fall risk  Pt will be able to perform all normal daily activities without pain in the low back.     See Exercise, Manual, and Modality Logs for complete treatment.     Assessment/Plan  Pt continues to have limited function due to back pain. Pt is getting some relief in the R leg from her pain although the low back continues to be painful while standing making daily activities and mobility difficult. Pt to continue with PT next session as tolerated.       Progress per Plan of Care      Visit Diagnosis:    ICD-10-CM ICD-9-CM   1. Balance problem  R26.89 781.99   2. Lumbar pain  M54.50 724.2              Timed Treatment:  Manual Therapy:    14     mins  85047;  Therapeutic Exercise:    30     mins  83580;     Neuromuscular Carli:        mins  29806;    Therapeutic Activity:     14     mins  29946;     Gait Training:           mins  33400;     Ultrasound:          mins  64973;    Electrical Stimulation:         mins  72357 ( );  Dry Needling          mins self-pay  Iontophoresis          mins 14500    Untimed  Treatments:  Electrical Stimulation:         mins  93562 ( );  Dry Needling:                     mins  Ultrasound:                         mins  28290;        Timed Treatment:   58   mins   Total Treatment:     58   mins    Javid Campoverde PT  Physical Therapist

## 2024-11-21 ENCOUNTER — TREATMENT (OUTPATIENT)
Dept: PHYSICAL THERAPY | Facility: CLINIC | Age: 89
End: 2024-11-21
Payer: MEDICARE

## 2024-11-21 DIAGNOSIS — R26.89 BALANCE PROBLEM: Primary | ICD-10-CM

## 2024-11-21 DIAGNOSIS — M54.50 LUMBAR PAIN: ICD-10-CM

## 2024-11-21 NOTE — PROGRESS NOTES
Physical Therapy Daily Treatment Note      Visit #: 12    Khushbu Bledsoe reports 0/10 pain today at rest.  Pt states that her R leg has been feeling better. Pt reports that she has been having back pain still in on the R side. Pt reports that the manual therapy on the back felt pretty good last session but did not last.         Objective Pt present to PT today with no distress at rest.     Pt with tenderness in the R hip flexors and adductors with STM.    Pt with no increased pain in the R leg or back with activities today.       See Exercise, Manual, and Modality Logs for complete treatment.     Assessment/Plan  Pt continues to have back pain when she is up doing things around the house. Pt to continue with PT to help improve back pain, functional mobility, and activity tolerance.       Progress per Plan of Care      Visit Diagnosis:    ICD-10-CM ICD-9-CM   1. Balance problem  R26.89 781.99   2. Lumbar pain  M54.50 724.2              Timed Treatment:  Manual Therapy:    12     mins  22079;  Therapeutic Exercise:    14     mins  31311;     Neuromuscular Carli:    12    mins  14548;    Therapeutic Activity:          mins  70343;     Gait Training:           mins  38936;     Ultrasound:          mins  61541;    Electrical Stimulation:         mins  63646 ( );  Dry Needling          mins self-pay  Iontophoresis          mins 75724    Untimed Treatments:  Electrical Stimulation:         mins  43943 ( );  Dry Needling:                     mins  Ultrasound:                         mins  90474;        Timed Treatment:   38   mins   Total Treatment:     57   mins    Javid Campoverde, PT  Physical Therapist

## 2024-11-25 ENCOUNTER — PATIENT MESSAGE (OUTPATIENT)
Dept: INTERNAL MEDICINE | Facility: CLINIC | Age: 89
End: 2024-11-25
Payer: MEDICARE

## 2024-11-25 DIAGNOSIS — M48.061 SPINAL STENOSIS OF LUMBAR REGION WITHOUT NEUROGENIC CLAUDICATION: ICD-10-CM

## 2024-11-25 DIAGNOSIS — M54.16 LUMBAR RADICULOPATHY: ICD-10-CM

## 2024-11-26 ENCOUNTER — PATIENT MESSAGE (OUTPATIENT)
Dept: CARDIOLOGY | Facility: CLINIC | Age: 89
End: 2024-11-26
Payer: MEDICARE

## 2024-11-26 ENCOUNTER — TELEPHONE (OUTPATIENT)
Dept: CARDIOLOGY | Facility: CLINIC | Age: 89
End: 2024-11-26
Payer: MEDICARE

## 2024-11-26 RX ORDER — NIFEDIPINE 30 MG/1
30 TABLET, EXTENDED RELEASE ORAL DAILY
OUTPATIENT
Start: 2024-11-26

## 2024-11-26 RX ORDER — GABAPENTIN 100 MG/1
100 CAPSULE ORAL 3 TIMES DAILY
OUTPATIENT
Start: 2024-11-26

## 2024-11-26 RX ORDER — NIFEDIPINE 30 MG/1
30 TABLET, EXTENDED RELEASE ORAL DAILY
Qty: 90 TABLET | Refills: 1 | Status: SHIPPED | OUTPATIENT
Start: 2024-11-26

## 2024-11-26 RX ORDER — MELOXICAM 7.5 MG/1
7.5 TABLET ORAL DAILY
Qty: 90 TABLET | Refills: 1 | Status: SHIPPED | OUTPATIENT
Start: 2024-11-26

## 2024-11-27 ENCOUNTER — TELEPHONE (OUTPATIENT)
Dept: CARDIOLOGY | Facility: CLINIC | Age: 89
End: 2024-11-27
Payer: MEDICARE

## 2024-11-27 RX ORDER — NIFEDIPINE 30 MG/1
30 TABLET, EXTENDED RELEASE ORAL DAILY
OUTPATIENT
Start: 2024-11-27

## 2024-11-27 RX ORDER — HYDRALAZINE HYDROCHLORIDE 50 MG/1
50 TABLET, FILM COATED ORAL 3 TIMES DAILY
Qty: 270 TABLET | Refills: 1 | Status: SHIPPED | OUTPATIENT
Start: 2024-11-27

## 2024-11-27 RX ORDER — LISINOPRIL 40 MG/1
40 TABLET ORAL EVERY MORNING
Qty: 90 TABLET | Refills: 1 | Status: SHIPPED | OUTPATIENT
Start: 2024-11-27

## 2024-11-27 RX ORDER — CARVEDILOL 6.25 MG/1
6.25 TABLET ORAL 2 TIMES DAILY WITH MEALS
Qty: 180 TABLET | Refills: 1 | Status: SHIPPED | OUTPATIENT
Start: 2024-11-27

## 2024-11-27 NOTE — TELEPHONE ENCOUNTER
Received fax from Dr. Siva Ley at [facility], requesting cardiac clearance for pt to hold Eliquis for 3 days prior for future TYSON and resume 24 hours post TYSON.    If clearance is given, will fax to [phone], Attn: Khushbu

## 2024-12-03 ENCOUNTER — OFFICE VISIT (OUTPATIENT)
Dept: INTERNAL MEDICINE | Facility: CLINIC | Age: 89
End: 2024-12-03
Payer: MEDICARE

## 2024-12-03 VITALS
DIASTOLIC BLOOD PRESSURE: 62 MMHG | WEIGHT: 133 LBS | SYSTOLIC BLOOD PRESSURE: 120 MMHG | HEART RATE: 62 BPM | TEMPERATURE: 97.1 F | BODY MASS INDEX: 25.11 KG/M2 | HEIGHT: 61 IN | OXYGEN SATURATION: 95 %

## 2024-12-03 DIAGNOSIS — R53.83 FATIGUE, UNSPECIFIED TYPE: ICD-10-CM

## 2024-12-03 DIAGNOSIS — R79.9 ABNORMAL BLOOD CHEMISTRY: ICD-10-CM

## 2024-12-03 DIAGNOSIS — E55.9 VITAMIN D DEFICIENCY: ICD-10-CM

## 2024-12-03 DIAGNOSIS — H65.93 MEE (MIDDLE EAR EFFUSION), BILATERAL: ICD-10-CM

## 2024-12-03 DIAGNOSIS — R42 DIZZINESS: Primary | ICD-10-CM

## 2024-12-03 DIAGNOSIS — H61.23 BILATERAL IMPACTED CERUMEN: ICD-10-CM

## 2024-12-03 PROCEDURE — 99214 OFFICE O/P EST MOD 30 MIN: CPT | Performed by: NURSE PRACTITIONER

## 2024-12-03 PROCEDURE — 69210 REMOVE IMPACTED EAR WAX UNI: CPT | Performed by: NURSE PRACTITIONER

## 2024-12-03 RX ORDER — FLUTICASONE PROPIONATE 50 MCG
2 SPRAY, SUSPENSION (ML) NASAL DAILY
Qty: 16 G | Refills: 0 | Status: SHIPPED | OUTPATIENT
Start: 2024-12-03

## 2024-12-03 NOTE — PROGRESS NOTES
Office Visit      Date: 2024   Patient Name: Khushbu Bledsoe  : 3/17/1934   MRN: [id number]     Chief Complaint:    Chief Complaint   Patient presents with    Dizziness     X Friday, light headedness     Fatigue       History of Present Illness: Khushbu Bledsoe is a 89 y.o. female presentsing for dizziness and fatigue.   Started Friday. No ear pain, decreased hearing, ear fullness, URI symptoms, neck pain, hypotension, chest pain, soa, edema. She feels dizzy when standing up (chronically) but this is more with turning her head as well. She tries to stay hydrated with water. She is on hypertensive meds and diuretics.   Been 6 months since labs were done, no hx of vitamin deficiency     Subjective      Review of Systems:   Review of Systems   Constitutional:  Positive for fatigue. Negative for chills and fever.   Respiratory:  Negative for cough, chest tightness, shortness of breath and wheezing.    Cardiovascular:  Negative for chest pain, palpitations and leg swelling.   Neurological:  Positive for dizziness. Negative for tremors, weakness, numbness and headaches.         I have reviewed the patients family history, social history, past medical history, past surgical history and have updated it as appropriate.     Medications:     Current Outpatient Medications:     bumetanide (BUMEX) 1 MG tablet, Take 1 tablet by mouth Daily., Disp: , Rfl:     carvedilol (COREG) 6.25 MG tablet, Take 1 tablet by mouth 2 (Two) Times a Day With Meals., Disp: 180 tablet, Rfl: 1    Cholecalciferol 125 MCG (5000 UT) tablet, Take 1 tablet by mouth Daily., Disp: , Rfl:     Eliquis 2.5 MG tablet tablet, Take 1 tablet by mouth Every 12 (Twelve) Hours., Disp: 180 tablet, Rfl: 3    fluticasone (FLONASE) 50 MCG/ACT nasal spray, Administer 2 sprays into the nostril(s) as directed by provider Daily., Disp: 16 g, Rfl: 0    gabapentin (NEURONTIN) 100 MG capsule, Take 1 capsule by mouth 3 (Three) Times a Day., Disp: , Rfl:      hydrALAZINE (APRESOLINE) 50 MG tablet, Take 1 tablet by mouth 3 (Three) Times a Day., Disp: 270 tablet, Rfl: 1    lisinopril (PRINIVIL,ZESTRIL) 40 MG tablet, Take 1 tablet by mouth Every Morning., Disp: 90 tablet, Rfl: 1    meloxicam (MOBIC) 7.5 MG tablet, Take 1 tablet by mouth Daily., Disp: 90 tablet, Rfl: 1    Multiple Vitamins-Minerals (PRESERVISION AREDS 2 PO), Take 1 capsule by mouth 2 (Two) Times a Day., Disp: , Rfl:     multivitamin (Multi-Vitamin Daily) tablet tablet, Take 1 tablet by mouth Daily., Disp: , Rfl:     NIFEdipine XL (PROCARDIA XL) 30 MG 24 hr tablet, Take 1 tablet by mouth Daily., Disp: 90 tablet, Rfl: 1    Omega-3 Fatty Acids (fish oil) 1200 MG capsule capsule, Take 1 capsule by mouth Daily., Disp: , Rfl:     spironolactone (ALDACTONE) 12.5 MG tablet half tablet, Take 1 half tablet by mouth Daily., Disp: , Rfl:     Allergies:   No Known Allergies    Objective     Physical Exam  Vitals and nursing note reviewed.   Constitutional:       General: She is not in acute distress.     Appearance: Normal appearance.   HENT:      Head: Normocephalic and atraumatic.      Right Ear: Ear canal and external ear normal. A middle ear effusion is present. There is impacted cerumen (partial).      Left Ear: Ear canal and external ear normal. A middle ear effusion is present. There is impacted cerumen.      Ears:      Comments: Left > right     Nose: Nose normal.      Right Sinus: No maxillary sinus tenderness or frontal sinus tenderness.      Left Sinus: No maxillary sinus tenderness or frontal sinus tenderness.      Mouth/Throat:      Lips: Pink.      Mouth: Mucous membranes are moist.      Pharynx: Oropharynx is clear. Uvula midline. No postnasal drip.   Eyes:      General: No scleral icterus.        Right eye: No discharge.         Left eye: No discharge.      Extraocular Movements: Extraocular movements intact.      Conjunctiva/sclera: Conjunctivae normal.      Pupils: Pupils are equal, round, and reactive  "to light.   Neck:      Vascular: No carotid bruit.   Cardiovascular:      Rate and Rhythm: Normal rate and regular rhythm.      Heart sounds: Normal heart sounds.   Pulmonary:      Effort: Pulmonary effort is normal. No respiratory distress.      Breath sounds: Normal breath sounds. No wheezing or rhonchi.   Abdominal:      Tenderness: There is no abdominal tenderness.   Musculoskeletal:         General: Normal range of motion.      Cervical back: Normal range of motion and neck supple. No tenderness.      Right lower leg: No edema.      Left lower leg: No edema.   Lymphadenopathy:      Cervical: No cervical adenopathy.   Skin:     General: Skin is warm and dry.   Neurological:      General: No focal deficit present.      Mental Status: She is alert and oriented to person, place, and time.   Psychiatric:         Mood and Affect: Mood normal.         Behavior: Behavior normal.         Thought Content: Thought content normal.         Vital Signs:   Vitals:    12/03/24 0842   BP: 120/62   Pulse: 62   Temp: 97.1 °F (36.2 °C)   SpO2: 95%   Weight: 60.3 kg (133 lb)   Height: 154.9 cm (61\")     Body mass index is 25.13 kg/m².      Ear Cerumen Removal    Date/Time: 12/3/2024 9:01 AM    Performed by: Yaquelin Gibbs MA  Authorized by: Devi Schroeder APRN  Consent: Verbal consent obtained.  Risks and benefits: risks, benefits and alternatives were discussed  Consent given by: patient  Patient understanding: patient states understanding of the procedure being performed  Patient identity confirmed: verbally with patient  Location details: left ear and right ear  Patient tolerance: patient tolerated the procedure well with no immediate complications  Procedure type: instrumentation, irrigation       Assessment / Plan      Assessment/Plan:   Diagnoses and all orders for this visit:    1. Dizziness (Primary)  -     CBC (No Diff)  -     Comprehensive Metabolic Panel  -     Vitamin D,25-Hydroxy  -     Vitamin B12  -     Iron " and TIBC  -     Ferritin    2. Fatigue, unspecified type  -     CBC (No Diff)  -     Comprehensive Metabolic Panel  -     Vitamin D,25-Hydroxy  -     Vitamin B12  -     Iron and TIBC  -     Ferritin    3. Abnormal blood chemistry  -     CBC (No Diff)  -     Comprehensive Metabolic Panel  -     Vitamin D,25-Hydroxy  -     Vitamin B12  -     Iron and TIBC  -     Ferritin    4. Vitamin D deficiency  -     Vitamin D,25-Hydroxy    5. Bilateral impacted cerumen  -     Ear Cerumen Removal    6. FRANKIE (middle ear effusion), bilateral  -     fluticasone (FLONASE) 50 MCG/ACT nasal spray; Administer 2 sprays into the nostril(s) as directed by provider Daily.  Dispense: 16 g; Refill: 0       Cerumen noted in bilateral ears, left worse than right  Cerumen removed with irrigation and curettage, tolerated well  Fluid was noted behind both TMs, left worse than right  Initiate Flonase, 2 sprays daily at an angle, hyperinflation exercises demonstrated/recommended daily  Labs to check for anemia, electrolyte imbalance, vitamin deficiencies  No carotid bruit noted  Euvolemic on exam  Stay hydrated with 60 fluid ounces daily  If dizziness persist can refer to vestibular physical therapy    Follow Up:   KENDRA VAUGHAN  [facility] [place]

## 2024-12-04 LAB
25(OH)D3+25(OH)D2 SERPL-MCNC: 73.1 NG/ML (ref 30–100)
ALBUMIN SERPL-MCNC: 4.3 G/DL (ref 3.5–5.2)
ALBUMIN/GLOB SERPL: 1.9 G/DL
ALP SERPL-CCNC: 164 U/L (ref 39–117)
ALT SERPL-CCNC: 33 U/L (ref 1–33)
AST SERPL-CCNC: 20 U/L (ref 1–32)
BILIRUB SERPL-MCNC: 0.4 MG/DL (ref 0–1.2)
BUN SERPL-MCNC: 67 MG/DL (ref 8–23)
BUN/CREAT SERPL: 38.1 (ref 7–25)
CALCIUM SERPL-MCNC: 9.7 MG/DL (ref 8.2–9.6)
CHLORIDE SERPL-SCNC: 102 MMOL/L (ref 98–107)
CO2 SERPL-SCNC: 26.6 MMOL/L (ref 22–29)
CREAT SERPL-MCNC: 1.76 MG/DL (ref 0.57–1)
EGFRCR SERPLBLD CKD-EPI 2021: 27.2 ML/MIN/1.73
ERYTHROCYTE [DISTWIDTH] IN BLOOD BY AUTOMATED COUNT: 14.2 % (ref 12.3–15.4)
FERRITIN SERPL-MCNC: 49.4 NG/ML (ref 13–150)
GLOBULIN SER CALC-MCNC: 2.3 GM/DL
GLUCOSE SERPL-MCNC: 178 MG/DL (ref 65–99)
HCT VFR BLD AUTO: 40 % (ref 34–46.6)
HGB BLD-MCNC: 12.5 G/DL (ref 12–15.9)
IRON SATN MFR SERPL: 17 % (ref 20–50)
IRON SERPL-MCNC: 69 MCG/DL (ref 37–145)
MCH RBC QN AUTO: 27.4 PG (ref 26.6–33)
MCHC RBC AUTO-ENTMCNC: 31.3 G/DL (ref 31.5–35.7)
MCV RBC AUTO: 87.7 FL (ref 79–97)
PLATELET # BLD AUTO: 313 10*3/MM3 (ref 140–450)
POTASSIUM SERPL-SCNC: 4.2 MMOL/L (ref 3.5–5.2)
PROT SERPL-MCNC: 6.6 G/DL (ref 6–8.5)
RBC # BLD AUTO: 4.56 10*6/MM3 (ref 3.77–5.28)
SODIUM SERPL-SCNC: 140 MMOL/L (ref 136–145)
TIBC SERPL-MCNC: 399 MCG/DL
UIBC SERPL-MCNC: 330 MCG/DL (ref 112–346)
VIT B12 SERPL-MCNC: 677 PG/ML (ref 211–946)
WBC # BLD AUTO: 9.1 10*3/MM3 (ref 3.4–10.8)

## 2024-12-31 ENCOUNTER — TELEMEDICINE (OUTPATIENT)
Dept: FAMILY MEDICINE CLINIC | Facility: TELEHEALTH | Age: 89
End: 2024-12-31
Payer: MEDICARE

## 2024-12-31 DIAGNOSIS — J40 BRONCHITIS: Primary | ICD-10-CM

## 2024-12-31 RX ORDER — BENZONATATE 100 MG/1
100 CAPSULE ORAL 3 TIMES DAILY PRN
Qty: 21 CAPSULE | Refills: 0 | Status: SHIPPED | OUTPATIENT
Start: 2024-12-31

## 2024-12-31 RX ORDER — AZITHROMYCIN 250 MG/1
TABLET, FILM COATED ORAL
Qty: 6 TABLET | Refills: 0 | Status: SHIPPED | OUTPATIENT
Start: 2024-12-31

## 2024-12-31 RX ORDER — ALBUTEROL SULFATE 90 UG/1
2 INHALANT RESPIRATORY (INHALATION) EVERY 4 HOURS PRN
Qty: 18 G | Refills: 0 | Status: SHIPPED | OUTPATIENT
Start: 2024-12-31

## 2024-12-31 NOTE — PROGRESS NOTES
Subjective   Khushbu Bledsoe is a 89 y.o. female.     History of Present Illness  She presents with c/o incessant cough, weakness, light-headedness, runny nose, achiness. Her symptoms started 12/25 and have been worsening. Denies sick contacts. Her was home covid-19 test was negative. She has been drinking and eating, although she didn't drink as much yesterday as she should have. She has taken tylenol, dayquil and nyquil. BP is 116/56 now at home.       The following portions of the patient's history were reviewed and updated as appropriate: allergies, current medications, past family history, past medical history, past social history, past surgical history, and problem list.    Review of Systems   Constitutional:  Negative for fever.   HENT:  Positive for rhinorrhea. Negative for sinus pressure.    Respiratory:  Positive for cough (dry), shortness of breath (mild) and wheezing.    Gastrointestinal:  Negative for diarrhea, nausea and vomiting.   Neurological:  Positive for light-headedness. Negative for tremors.       Objective   Physical Exam  Constitutional:       General: She is not in acute distress.     Appearance: She is well-developed. She is not diaphoretic.   Pulmonary:      Effort: Pulmonary effort is normal.   Neurological:      Mental Status: She is alert and oriented to person, place, and time.   Psychiatric:         Behavior: Behavior normal.           Assessment & Plan   Diagnoses and all orders for this visit:    1. Bronchitis (Primary)  -     azithromycin (Zithromax Z-Will) 250 MG tablet; Take 2 tablets by mouth on day 1, then 1 tablet daily on days 2-5  Dispense: 6 tablet; Refill: 0  -     benzonatate (Tessalon Perles) 100 MG capsule; Take 1 capsule by mouth 3 (Three) Times a Day As Needed for Cough.  Dispense: 21 capsule; Refill: 0  -     albuterol sulfate  (90 Base) MCG/ACT inhaler; Inhale 2 puffs Every 4 (Four) Hours As Needed for Wheezing.  Dispense: 18 g; Refill: 0        Push fluids.  If dizziness worsens or does not improve with increased fluid intake, go to the ER. She does have family there to help her.         The use of a video visit has been reviewed with the patient and verbal informed consent has been obtained. Myself and Khushbu Bledsoe and her children participated in this visit. The patient is located in [place], [place]. I am located in [place], [place]. [employer] and [employer] were utilized. I spent 20 minutes in the patient's chart for this visit.

## 2025-01-29 ENCOUNTER — OFFICE VISIT (OUTPATIENT)
Dept: INTERNAL MEDICINE | Facility: CLINIC | Age: OVER 89
End: 2025-01-29
Payer: MEDICARE

## 2025-01-29 VITALS
BODY MASS INDEX: 26.62 KG/M2 | OXYGEN SATURATION: 97 % | SYSTOLIC BLOOD PRESSURE: 129 MMHG | DIASTOLIC BLOOD PRESSURE: 57 MMHG | HEART RATE: 63 BPM | HEIGHT: 61 IN | RESPIRATION RATE: 16 BRPM | TEMPERATURE: 97.7 F | WEIGHT: 141 LBS

## 2025-01-29 DIAGNOSIS — M54.16 LUMBAR RADICULOPATHY: ICD-10-CM

## 2025-01-29 DIAGNOSIS — I50.33 ACUTE ON CHRONIC DIASTOLIC CHF (CONGESTIVE HEART FAILURE): ICD-10-CM

## 2025-01-29 DIAGNOSIS — I10 ESSENTIAL HYPERTENSION: ICD-10-CM

## 2025-01-29 DIAGNOSIS — R54 AGE-RELATED PHYSICAL DEBILITY: ICD-10-CM

## 2025-01-29 DIAGNOSIS — R06.89 DIFFICULTY BREATHING: Primary | ICD-10-CM

## 2025-01-29 DIAGNOSIS — I48.0 AF (PAROXYSMAL ATRIAL FIBRILLATION): ICD-10-CM

## 2025-01-29 PROCEDURE — G2211 COMPLEX E/M VISIT ADD ON: HCPCS | Performed by: STUDENT IN AN ORGANIZED HEALTH CARE EDUCATION/TRAINING PROGRAM

## 2025-01-29 PROCEDURE — 99214 OFFICE O/P EST MOD 30 MIN: CPT | Performed by: STUDENT IN AN ORGANIZED HEALTH CARE EDUCATION/TRAINING PROGRAM

## 2025-01-29 NOTE — ASSESSMENT & PLAN NOTE
Currently following with pain clinic on gabapentin 100 mg 3 times a day.  She has been on meloxicam as well however patient refused to discontinue this 2 months ago and claims that she would rather be comfortable then manage her kidney disease.  Today, I discussed with patient and son options regarding palliative care if patient and family chooses to do so given that NSAIDs such as meloxicam which help with her pain are affecting her kidneys and causing worsening of kidney function.  They both agreed and showed complete understanding.  They would like to talk about it with her family.  For now, I did advise patient to discontinue meloxicam and discuss other alternatives with pain clinic during their follow-up today.  Recent creatinine is 1.76.

## 2025-01-29 NOTE — ASSESSMENT & PLAN NOTE
Patient currently following with cardiology, does have difficulty breathing.  Advised patient to call cardiologist, may need repeat echocardiogram

## 2025-01-29 NOTE — PROGRESS NOTES
Office Note     Name: Khushbu Bledsoe    : 3/17/1934     MRN: [id number]     Chief Complaint  Shortness of Breath (When walking a short distance.)    Subjective     History of Present Illness:  Khushbu Bledsoe is a 89 y.o. female who presents today for chronic conditions    Hypertension on carvedilol, lisinopril, hydralazine, nifedipine, spironolactone  Paroxysmal atrial fibrillation on carvedilol, Eliquis  CHF on Bumex, spironolactone  Hyperlipidemia on fatty acids     Lumbar spinal canal stenosis, severe foraminal narrowing at L3-L4, multilevel degenerative changes: Continues to have low back pain: was following with pain clinic and planning for epidural injections for pain management.  On as needed meloxicam and gabapentin 100 mg 3 times a day but was only taking gabapentin for her history of shingles she has been on meloxicam however kidney function has been increasing.    Patient notes difficulty breathing during exertion even while tying her shoes.  At this time she denies any difficulty breathing, cough or swelling.  No chest pain.  Last echo was 2024 EF 60-65% Grade II diastolic dysfunction (pseudonormal)    Family History:   Family History   Problem Relation Age of Onset    Heart attack Brother     Cancer Son        Social History:   Social History     Socioeconomic History    Marital status:    Tobacco Use    Smoking status: Never    Smokeless tobacco: Never   Vaping Use    Vaping status: Never Used   Substance and Sexual Activity    Alcohol use: Not Currently    Drug use: Never    Sexual activity: Not Currently     Partners: Male       Health Maintenance   Topic Date Due    DXA SCAN  Never done    TDAP/TD VACCINES (1 - Tdap) Never done    Pneumococcal Vaccine 65+ (2 of 2 - PPSV23 or PCV20) 12/15/2017    ANNUAL WELLNESS VISIT  2024    COVID-19 Vaccine ( - -25 season) 2025 (Originally 2024)    RSV Vaccine - Adults (1 - 1-dose 75+ series) 2025 (Originally 3/17/2009)  "   BMI FOLLOWUP  10/07/2025    INFLUENZA VACCINE  Completed    ZOSTER VACCINE  Completed       Patient Care Team:  Ana Ross MD as PCP - General (Family Medicine)  Van Ch MD as Consulting Physician (Interventional Cardiology)  Siva Ley MD as Consulting Physician (Pain Medicine)    Objective     Vital Signs  /57   Pulse 63   Temp 97.7 °F (36.5 °C) (Infrared)   Resp 16   Ht 154.9 cm (60.98\")   Wt 64 kg (141 lb)   SpO2 97%   BMI 26.66 kg/m²   Estimated body mass index is 26.66 kg/m² as calculated from the following:    Height as of this encounter: 154.9 cm (60.98\").    Weight as of this encounter: 64 kg (141 lb).        Physical Exam  Vitals and nursing note reviewed.   Constitutional:       Appearance: Normal appearance.   HENT:      Head: Normocephalic and atraumatic.   Cardiovascular:      Rate and Rhythm: Normal rate and regular rhythm.      Pulses: Normal pulses.      Heart sounds: Normal heart sounds.      Comments: 1+ pitting edema bilaterally chronic  Pulmonary:      Effort: Pulmonary effort is normal. No respiratory distress.      Breath sounds: Normal breath sounds. No wheezing, rhonchi or rales.   Abdominal:      General: Abdomen is flat. Bowel sounds are normal.      Palpations: Abdomen is soft.      Tenderness: There is no abdominal tenderness. There is no guarding.   Musculoskeletal:      Cervical back: Neck supple.   Skin:     General: Skin is warm.      Capillary Refill: Capillary refill takes less than 2 seconds.   Neurological:      General: No focal deficit present.      Mental Status: She is alert. Mental status is at baseline.   Psychiatric:         Mood and Affect: Mood normal.         Behavior: Behavior normal.          Procedures     Assessment and Plan     Diagnoses and all orders for this visit:    1. Difficulty breathing (Primary)  Assessment & Plan:  Acute on chronic, worsening.  Will obtain PFTs for now advised to call cardiologist to " obtain echocardiogram.    Orders:  -     Complete PFT - Pre & Post Bronchodilator; Future    2. Acute on chronic diastolic CHF (congestive heart failure)  Assessment & Plan:  Patient currently following with cardiology, does have difficulty breathing.  Advised patient to call cardiologist, may need repeat echocardiogram           3. AF (paroxysmal atrial fibrillation)  Assessment & Plan:  Follows with cardiology on Eliquis and carvedilol      4. Essential hypertension  Assessment & Plan:  Follows with cardiology      5. Lumbar radiculopathy  Assessment & Plan:  Currently following with pain clinic on gabapentin 100 mg 3 times a day.  She has been on meloxicam as well however patient refused to discontinue this 2 months ago and claims that she would rather be comfortable then manage her kidney disease.  Today, I discussed with patient and son options regarding palliative care if patient and family chooses to do so given that NSAIDs such as meloxicam which help with her pain are affecting her kidneys and causing worsening of kidney function.  They both agreed and showed complete understanding.  They would like to talk about it with her family.  For now, I did advise patient to discontinue meloxicam and discuss other alternatives with pain clinic during their follow-up today.  Recent creatinine is 1.76.       6. Age-related physical debility  Assessment & Plan:   fall precautions advised  Continue walker           Counseling was given to patient for the following topics: instructions for management, risks and benefits of treatment options, and importance of treatment compliance.    Follow Up  Return in about 6 months (around 7/29/2025) for Medicare Wellness.    MD SAMANTHA Wilcox MR  [facility]  [address] [place] [id number] [phone]

## 2025-01-29 NOTE — ASSESSMENT & PLAN NOTE
Acute on chronic, worsening.  Will obtain PFTs for now advised to call cardiologist to obtain echocardiogram.

## 2025-01-30 DIAGNOSIS — R06.89 DIFFICULTY BREATHING: ICD-10-CM

## 2025-01-30 DIAGNOSIS — I50.33 ACUTE ON CHRONIC DIASTOLIC CHF (CONGESTIVE HEART FAILURE): Primary | ICD-10-CM

## 2025-02-03 DIAGNOSIS — R54 AGE-RELATED PHYSICAL DEBILITY: ICD-10-CM

## 2025-02-03 DIAGNOSIS — I34.0 NONRHEUMATIC MITRAL VALVE REGURGITATION: Primary | ICD-10-CM

## 2025-02-03 DIAGNOSIS — I50.33 ACUTE ON CHRONIC DIASTOLIC CHF (CONGESTIVE HEART FAILURE): ICD-10-CM

## 2025-02-03 DIAGNOSIS — M54.16 LUMBAR RADICULOPATHY: ICD-10-CM

## 2025-02-03 DIAGNOSIS — I10 ESSENTIAL HYPERTENSION: ICD-10-CM

## 2025-02-03 DIAGNOSIS — R26.89 LOSS OF BALANCE: ICD-10-CM

## 2025-02-03 DIAGNOSIS — I48.0 AF (PAROXYSMAL ATRIAL FIBRILLATION): ICD-10-CM

## 2025-02-06 ENCOUNTER — OFFICE VISIT (OUTPATIENT)
Dept: INTERNAL MEDICINE | Facility: CLINIC | Age: OVER 89
End: 2025-02-06
Payer: MEDICARE

## 2025-02-06 VITALS
DIASTOLIC BLOOD PRESSURE: 57 MMHG | RESPIRATION RATE: 16 BRPM | HEART RATE: 68 BPM | HEIGHT: 61 IN | BODY MASS INDEX: 25.68 KG/M2 | WEIGHT: 136 LBS | TEMPERATURE: 98.4 F | OXYGEN SATURATION: 96 % | SYSTOLIC BLOOD PRESSURE: 113 MMHG

## 2025-02-06 DIAGNOSIS — M48.061 SPINAL STENOSIS OF LUMBAR REGION WITHOUT NEUROGENIC CLAUDICATION: ICD-10-CM

## 2025-02-06 DIAGNOSIS — N18.4 CKD (CHRONIC KIDNEY DISEASE) STAGE 4, GFR 15-29 ML/MIN: ICD-10-CM

## 2025-02-06 DIAGNOSIS — R06.89 DIFFICULTY BREATHING: Primary | ICD-10-CM

## 2025-02-06 PROCEDURE — G2211 COMPLEX E/M VISIT ADD ON: HCPCS | Performed by: STUDENT IN AN ORGANIZED HEALTH CARE EDUCATION/TRAINING PROGRAM

## 2025-02-06 PROCEDURE — 99214 OFFICE O/P EST MOD 30 MIN: CPT | Performed by: STUDENT IN AN ORGANIZED HEALTH CARE EDUCATION/TRAINING PROGRAM

## 2025-02-06 NOTE — ASSESSMENT & PLAN NOTE
Previously discussed with patient and son that meloxicam is causing kidney disease to worsen. We eventually discontinued this a week ago. But since then, patient has not been comfortable. She does see pain clinic and will see them next week for a procedure. I discussed with patient and son today that we can resume meloxicam until she sees pain clinic next week, and have them take over pain management from then on given her CKD.  We have also discussed the option of working with palliative care given that patient chooses to be comfortable.  Referral has been sent a few days ago, still pending schedule.

## 2025-02-06 NOTE — ASSESSMENT & PLAN NOTE
Start albuterol inhaler as needed for SOB or wheezing  Start tiotropium daily  If worsening or if with fever or chills, call clinic

## 2025-02-06 NOTE — PROGRESS NOTES
Office Note     Name: Khushbu Bledsoe    : 3/17/1934     MRN: [id number]     Chief Complaint  Shortness of Breath (Wheezes while bending over.), Leg Pain (Radiates all the way down to the foot. Painful when sitting, getting up, and walking), and Fatigue    Subjective     History of Present Illness:  Khushbu Bledsoe is a 89 y.o. female who presents today for shortness of breath and worsening leg pain. She has been off of meloxicam for about a week now and notes pain on hip especially when getting up from bed. Still has prescription coming in to mail. Has appt with pain clinic next week.    Complains of occasional shortness of breath during exertion. Does have wheezing when walking to the bathroom or getting up from seat. Not been using her inhaler.      Family History:   Family History   Problem Relation Age of Onset    Heart attack Brother     Cancer Son        Social History:   Social History     Socioeconomic History    Marital status:    Tobacco Use    Smoking status: Never    Smokeless tobacco: Never   Vaping Use    Vaping status: Never Used   Substance and Sexual Activity    Alcohol use: Not Currently    Drug use: Never    Sexual activity: Not Currently     Partners: Male       Health Maintenance   Topic Date Due    DXA SCAN  Never done    TDAP/TD VACCINES (1 - Tdap) Never done    Pneumococcal Vaccine 65+ (2 of 2 - PPSV23) 2015    ANNUAL WELLNESS VISIT  2024    COVID-19 Vaccine ( - - season) 2025 (Originally 2024)    RSV Vaccine - Adults (1 - 1-dose 75+ series) 2025 (Originally 3/17/2009)    BMI FOLLOWUP  10/07/2025    INFLUENZA VACCINE  Completed    ZOSTER VACCINE  Completed       Patient Care Team:  Ana Ross MD as PCP - General (Family Medicine)  Van Ch MD as Consulting Physician (Interventional Cardiology)  Siva Ley MD as Consulting Physician (Pain Medicine)    Objective     Vital Signs  /57   Pulse 68   Temp  "98.4 °F (36.9 °C) (Infrared)   Resp 16   Ht 154.9 cm (60.98\")   Wt 61.7 kg (136 lb)   SpO2 96%   BMI 25.71 kg/m²   Estimated body mass index is 25.71 kg/m² as calculated from the following:    Height as of this encounter: 154.9 cm (60.98\").    Weight as of this encounter: 61.7 kg (136 lb).        Physical Exam  Vitals and nursing note reviewed.   Constitutional:       Appearance: Normal appearance.   HENT:      Head: Normocephalic and atraumatic.   Cardiovascular:      Rate and Rhythm: Normal rate and regular rhythm.      Pulses: Normal pulses.      Heart sounds: Normal heart sounds.   Pulmonary:      Effort: Pulmonary effort is normal. No respiratory distress.      Breath sounds: Normal breath sounds. No wheezing, rhonchi or rales.   Musculoskeletal:      Cervical back: Neck supple.   Skin:     General: Skin is warm.      Capillary Refill: Capillary refill takes less than 2 seconds.   Neurological:      Mental Status: She is alert.          Procedures     Assessment and Plan     Diagnoses and all orders for this visit:    1. Difficulty breathing (Primary)  Assessment & Plan:  Start albuterol inhaler as needed for SOB or wheezing  Start tiotropium daily  If worsening or if with fever or chills, call clinic      2. Spinal stenosis of lumbar region without neurogenic claudication  Assessment & Plan:  Following with pain clinic may resume meloxicam 7.5 mg daily for now until next week      3. CKD (chronic kidney disease) stage 4, GFR 15-29 ml/min  Assessment & Plan:  Previously discussed with patient and son that meloxicam is causing kidney disease to worsen. We eventually discontinued this a week ago. But since then, patient has not been comfortable. She does see pain clinic and will see them next week for a procedure. I discussed with patient and son today that we can resume meloxicam until she sees pain clinic next week, and have them take over pain management from then on given her CKD.  We have also discussed " the option of working with palliative care given that patient chooses to be comfortable.  Referral has been sent a few days ago, still pending schedule.           Counseling was given to patient for the following topics: instructions for management, risks and benefits of treatment options, and importance of treatment compliance.    Follow Up  No follow-ups on file.    MD SAMANTHA Wilcox [facility]  [address] [place] [id number] [phone]

## 2025-02-10 ENCOUNTER — HOSPITAL ENCOUNTER (OUTPATIENT)
Dept: CARDIOLOGY | Facility: HOSPITAL | Age: OVER 89
Discharge: HOME | End: 2025-02-10
Admitting: INTERNAL MEDICINE
Payer: MEDICARE

## 2025-02-10 VITALS — BODY MASS INDEX: 25.68 KG/M2 | HEIGHT: 61 IN | WEIGHT: 136.02 LBS

## 2025-02-10 DIAGNOSIS — R06.89 DIFFICULTY BREATHING: Primary | ICD-10-CM

## 2025-02-10 DIAGNOSIS — R06.89 DIFFICULTY BREATHING: ICD-10-CM

## 2025-02-10 DIAGNOSIS — I50.33 ACUTE ON CHRONIC DIASTOLIC CHF (CONGESTIVE HEART FAILURE): ICD-10-CM

## 2025-02-10 DIAGNOSIS — J40 BRONCHITIS: ICD-10-CM

## 2025-02-10 PROCEDURE — 93306 TTE W/DOPPLER COMPLETE: CPT

## 2025-02-10 RX ORDER — ALBUTEROL SULFATE 90 UG/1
2 INHALANT RESPIRATORY (INHALATION) EVERY 4 HOURS PRN
Qty: 18 G | Refills: 0 | Status: SHIPPED | OUTPATIENT
Start: 2025-02-10

## 2025-02-12 ENCOUNTER — HOSPITAL ENCOUNTER (OUTPATIENT)
Dept: PULMONOLOGY | Facility: HOSPITAL | Age: OVER 89
Discharge: HOME | End: 2025-02-12
Admitting: STUDENT IN AN ORGANIZED HEALTH CARE EDUCATION/TRAINING PROGRAM
Payer: MEDICARE

## 2025-02-12 DIAGNOSIS — R06.89 DIFFICULTY BREATHING: ICD-10-CM

## 2025-02-12 PROCEDURE — 94010 BREATHING CAPACITY TEST: CPT

## 2025-02-12 PROCEDURE — 94726 PLETHYSMOGRAPHY LUNG VOLUMES: CPT

## 2025-02-12 PROCEDURE — 94729 DIFFUSING CAPACITY: CPT

## 2025-02-14 LAB
AV MEAN PRESS GRAD SYS DOP V1V2: 5.5 MMHG
AV VMAX SYS DOP: 161 CM/SEC
BH CV ECHO MEAS - AO MAX PG: 10.4 MMHG
BH CV ECHO MEAS - AO ROOT DIAM: 2.7 CM
BH CV ECHO MEAS - AO V2 VTI: 40.6 CM
BH CV ECHO MEAS - AVA(I,D): 1.69 CM2
BH CV ECHO MEAS - EDV(CUBED): 60 ML
BH CV ECHO MEAS - EDV(MOD-SP2): 59 ML
BH CV ECHO MEAS - EDV(MOD-SP4): 90.6 ML
BH CV ECHO MEAS - EF(MOD-SP2): 63.1 %
BH CV ECHO MEAS - EF(MOD-SP4): 65.6 %
BH CV ECHO MEAS - ESV(CUBED): 13.9 ML
BH CV ECHO MEAS - ESV(MOD-SP2): 21.8 ML
BH CV ECHO MEAS - ESV(MOD-SP4): 31.2 ML
BH CV ECHO MEAS - FS: 38.7 %
BH CV ECHO MEAS - IVS/LVPW: 1.03 CM
BH CV ECHO MEAS - IVSD: 1.18 CM
BH CV ECHO MEAS - LA DIMENSION: 4.1 CM
BH CV ECHO MEAS - LAT PEAK E' VEL: 6.4 CM/SEC
BH CV ECHO MEAS - LV DIASTOLIC VOL/BSA (35-75): 56.5 CM2
BH CV ECHO MEAS - LV MASS(C)D: 153.9 GRAMS
BH CV ECHO MEAS - LV MAX PG: 3.1 MMHG
BH CV ECHO MEAS - LV MEAN PG: 2 MMHG
BH CV ECHO MEAS - LV SYSTOLIC VOL/BSA (12-30): 19.5 CM2
BH CV ECHO MEAS - LV V1 MAX: 87.9 CM/SEC
BH CV ECHO MEAS - LV V1 VTI: 22 CM
BH CV ECHO MEAS - LVIDD: 3.9 CM
BH CV ECHO MEAS - LVIDS: 2.4 CM
BH CV ECHO MEAS - LVOT AREA: 3.1 CM2
BH CV ECHO MEAS - LVOT DIAM: 1.99 CM
BH CV ECHO MEAS - LVPWD: 1.15 CM
BH CV ECHO MEAS - MED PEAK E' VEL: 6 CM/SEC
BH CV ECHO MEAS - MV A MAX VEL: 114 CM/SEC
BH CV ECHO MEAS - MV DEC SLOPE: 411.2 CM/SEC2
BH CV ECHO MEAS - MV DEC TIME: 0.28 SEC
BH CV ECHO MEAS - MV E MAX VEL: 112 CM/SEC
BH CV ECHO MEAS - MV E/A: 0.98
BH CV ECHO MEAS - MV MAX PG: 4.8 MMHG
BH CV ECHO MEAS - MV MEAN PG: 1.97 MMHG
BH CV ECHO MEAS - MV P1/2T: 74.3 MSEC
BH CV ECHO MEAS - MV V2 VTI: 35.9 CM
BH CV ECHO MEAS - MVA(P1/2T): 3 CM2
BH CV ECHO MEAS - MVA(VTI): 1.91 CM2
BH CV ECHO MEAS - PA ACC TIME: 0.16 SEC
BH CV ECHO MEAS - PA V2 MAX: 119.4 CM/SEC
BH CV ECHO MEAS - RAP SYSTOLE: 3 MMHG
BH CV ECHO MEAS - RVSP: 40 MMHG
BH CV ECHO MEAS - SV(LVOT): 68.7 ML
BH CV ECHO MEAS - SV(MOD-SP2): 37.2 ML
BH CV ECHO MEAS - SV(MOD-SP4): 59.4 ML
BH CV ECHO MEAS - SVI(LVOT): 42.8 ML/M2
BH CV ECHO MEAS - SVI(MOD-SP2): 23.2 ML/M2
BH CV ECHO MEAS - SVI(MOD-SP4): 37 ML/M2
BH CV ECHO MEAS - TAPSE (>1.6): 2.2 CM
BH CV ECHO MEAS - TR MAX PG: 36.7 MMHG
BH CV ECHO MEAS - TR MAX VEL: 302.9 CM/SEC
BH CV ECHO MEASUREMENTS AVERAGE E/E' RATIO: 18.06
BH CV XLRA - RV BASE: 3.5 CM
BH CV XLRA - RV LENGTH: 5.1 CM
BH CV XLRA - RV MID: 2.5 CM
BH CV XLRA - TDI S': 13.4 CM/SEC
LEFT ATRIUM VOLUME INDEX: 40.6 ML/M2
LV EF BIPLANE MOD: 63.9 %

## 2025-03-05 DIAGNOSIS — N18.4 CKD (CHRONIC KIDNEY DISEASE) STAGE 4, GFR 15-29 ML/MIN: ICD-10-CM

## 2025-03-05 DIAGNOSIS — I50.33 ACUTE ON CHRONIC DIASTOLIC CHF (CONGESTIVE HEART FAILURE): ICD-10-CM

## 2025-03-05 DIAGNOSIS — I10 ESSENTIAL HYPERTENSION: Primary | ICD-10-CM

## 2025-03-08 LAB
ALBUMIN SERPL-MCNC: 3.5 G/DL (ref 3.5–5.2)
ALBUMIN/GLOB SERPL: 1.3 G/DL
ALP SERPL-CCNC: 238 U/L (ref 39–117)
ALT SERPL-CCNC: 51 U/L (ref 1–33)
AST SERPL-CCNC: 22 U/L (ref 1–32)
BILIRUB SERPL-MCNC: <0.2 MG/DL (ref 0–1.2)
BUN SERPL-MCNC: 65 MG/DL (ref 8–23)
BUN/CREAT SERPL: 38.5 (ref 7–25)
CALCIUM SERPL-MCNC: 9.5 MG/DL (ref 8.2–9.6)
CHLORIDE SERPL-SCNC: 109 MMOL/L (ref 98–107)
CO2 SERPL-SCNC: 24.2 MMOL/L (ref 22–29)
CREAT SERPL-MCNC: 1.69 MG/DL (ref 0.57–1)
EGFRCR SERPLBLD CKD-EPI 2021: 28.6 ML/MIN/1.73
ERYTHROCYTE [DISTWIDTH] IN BLOOD BY AUTOMATED COUNT: 14.2 % (ref 12.3–15.4)
FERRITIN SERPL-MCNC: 37.9 NG/ML (ref 13–150)
GLOBULIN SER CALC-MCNC: 2.6 GM/DL
GLUCOSE SERPL-MCNC: 129 MG/DL (ref 65–99)
HCT VFR BLD AUTO: 29.5 % (ref 34–46.6)
HGB BLD-MCNC: 9.3 G/DL (ref 12–15.9)
IRON SATN MFR SERPL: 12 % (ref 20–50)
IRON SERPL-MCNC: 46 MCG/DL (ref 37–145)
MCH RBC QN AUTO: 28.8 PG (ref 26.6–33)
MCHC RBC AUTO-ENTMCNC: 31.5 G/DL (ref 31.5–35.7)
MCV RBC AUTO: 91.3 FL (ref 79–97)
PLATELET # BLD AUTO: 279 10*3/MM3 (ref 140–450)
POTASSIUM SERPL-SCNC: 5.4 MMOL/L (ref 3.5–5.2)
PROT SERPL-MCNC: 6.1 G/DL (ref 6–8.5)
RBC # BLD AUTO: 3.23 10*6/MM3 (ref 3.77–5.28)
SODIUM SERPL-SCNC: 143 MMOL/L (ref 136–145)
TIBC SERPL-MCNC: 392 MCG/DL
UIBC SERPL-MCNC: 346 MCG/DL (ref 112–346)
WBC # BLD AUTO: 6.19 10*3/MM3 (ref 3.4–10.8)

## 2025-03-10 ENCOUNTER — RESULTS FOLLOW-UP (OUTPATIENT)
Dept: INTERNAL MEDICINE | Facility: CLINIC | Age: OVER 89
End: 2025-03-10
Payer: MEDICARE

## 2025-03-10 DIAGNOSIS — Z79.1 NSAID LONG-TERM USE: ICD-10-CM

## 2025-03-10 DIAGNOSIS — D64.9 ACUTE ANEMIA: ICD-10-CM

## 2025-03-10 DIAGNOSIS — D50.8 OTHER IRON DEFICIENCY ANEMIA: Primary | ICD-10-CM

## 2025-03-11 ENCOUNTER — OFFICE VISIT (OUTPATIENT)
Dept: INTERNAL MEDICINE | Facility: CLINIC | Age: OVER 89
End: 2025-03-11
Payer: MEDICARE

## 2025-03-11 VITALS
TEMPERATURE: 97.7 F | DIASTOLIC BLOOD PRESSURE: 78 MMHG | HEIGHT: 61 IN | RESPIRATION RATE: 18 BRPM | BODY MASS INDEX: 25.86 KG/M2 | SYSTOLIC BLOOD PRESSURE: 122 MMHG | HEART RATE: 68 BPM | WEIGHT: 137 LBS | OXYGEN SATURATION: 98 %

## 2025-03-11 DIAGNOSIS — Z13.820 SCREENING FOR OSTEOPOROSIS: ICD-10-CM

## 2025-03-11 DIAGNOSIS — I48.0 AF (PAROXYSMAL ATRIAL FIBRILLATION): ICD-10-CM

## 2025-03-11 DIAGNOSIS — Z00.00 ANNUAL PHYSICAL EXAM: ICD-10-CM

## 2025-03-11 DIAGNOSIS — R12 HEARTBURN: ICD-10-CM

## 2025-03-11 DIAGNOSIS — D50.8 OTHER IRON DEFICIENCY ANEMIA: ICD-10-CM

## 2025-03-11 DIAGNOSIS — I10 ESSENTIAL HYPERTENSION: ICD-10-CM

## 2025-03-11 DIAGNOSIS — Z00.00 ENCOUNTER FOR SUBSEQUENT ANNUAL WELLNESS VISIT (AWV) IN MEDICARE PATIENT: Primary | ICD-10-CM

## 2025-03-11 DIAGNOSIS — Z78.0 POSTMENOPAUSAL: ICD-10-CM

## 2025-03-11 DIAGNOSIS — N18.4 CKD (CHRONIC KIDNEY DISEASE) STAGE 4, GFR 15-29 ML/MIN: ICD-10-CM

## 2025-03-11 RX ORDER — PANTOPRAZOLE SODIUM 40 MG/1
40 TABLET, DELAYED RELEASE ORAL
Qty: 30 TABLET | Refills: 0 | Status: SHIPPED | OUTPATIENT
Start: 2025-03-11

## 2025-03-11 NOTE — ASSESSMENT & PLAN NOTE
Renal condition is stable.  Continue current treatment regimen.  Renal condition will be reassessed in 3 months.    Orders:    Comprehensive metabolic panel; Future    TSH Rfx On Abnormal To Free T4; Future

## 2025-03-11 NOTE — ASSESSMENT & PLAN NOTE
Hypertension is stable and controlled  Continue current treatment regimen.  Blood pressure will be reassessed in 3 months.    Orders:    TSH Rfx On Abnormal To Free T4; Future

## 2025-03-11 NOTE — PATIENT INSTRUCTIONS
Medicare Wellness  Personal Prevention Plan of Service     Date of Office Visit:    Encounter Provider:  Shavonne Duran PA-C  Place of Service:  [facility]  Patient Name: Khushbu Bledsoe  :  3/17/1934    As part of the Medicare Wellness portion of your visit today, we are providing you with this personalized preventive plan of services (PPPS). This plan is based upon recommendations of the [place] Preventive Services Task Force (USPSTF) and the Advisory Committee on Immunization Practices (ACIP).    This lists the preventive care services that should be considered, and provides dates of when you are due. Items listed as completed are up-to-date and do not require any further intervention.    Health Maintenance   Topic Date Due    DXA SCAN  Never done    TDAP/TD VACCINES (1 - Tdap) Never done    Pneumococcal Vaccine 50+ (2 of 2 - PPSV23) 2015    COVID-19 Vaccine (1 - - season) 2025 (Originally 2024)    RSV Vaccine - Adults (1 - 1-dose 75+ series) 2025 (Originally 3/17/2009)    BMI FOLLOWUP  10/07/2025    ANNUAL WELLNESS VISIT  2026    INFLUENZA VACCINE  Completed    ZOSTER VACCINE  Completed       Orders Placed This Encounter   Procedures    DEXA Bone Density Axial     Standing Status:   Future     Expected Date:   3/14/2025     Expiration Date:   2026     Is patient taking or have taken long term Glucocorticoid (steroids)?:   No     Does the patient have rheumatoid arthritis?:   No     Does the patient have secondary osteoporosis?:   No     Reason for Exam::   osteoporosis screening     Does this patient have a diabetic monitoring/medication delivering device on?:   No     Release to patient:   Routine Release [[id number]]    CBC (No Diff)     Standing Status:   Future     Expected Date:   2025     Expiration Date:   2026     Release to patient:   Routine Release [[id number]]    Comprehensive metabolic panel     Standing  Status:   Future     Expected Date:   6/2/2025     Expiration Date:   6/11/2026     Release to patient:   Routine Release [[id number]]    Ferritin     Standing Status:   Future     Expected Date:   6/2/2025     Expiration Date:   6/11/2026     Release to patient:   Routine Release [[id number]]    Iron Profile     Standing Status:   Future     Expected Date:   6/2/2025     Expiration Date:   6/11/2026     Release to patient:   Routine Release [[id number]]    TSH Rfx On Abnormal To Free T4     Standing Status:   Future     Expected Date:   6/2/2025     Expiration Date:   6/11/2026     Release to patient:   Routine Release [[id number]]       Return in about 3 months (around 6/11/2025) for Next scheduled follow up.

## 2025-03-11 NOTE — PROGRESS NOTES
Subjective   The ABCs of the Annual Wellness Visit  Medicare Wellness Visit      Khushbu Bledsoe is a 89 y.o. patient who presents for a Medicare Wellness Visit.    The following portions of the patient's history were reviewed and   updated as appropriate: allergies, current medications, past family history, past medical history, past social history, past surgical history, and problem list.    Compared to one year ago, the patient's physical health is worse.  Compared to one year ago, the patient's mental health is the same.    Recent Hospitalizations:  She was not admitted to the hospital during the last year.     Current Medical Providers:  Patient Care Team:  Ana Ross MD as PCP - General (Family Medicine)  Van Ch MD as Consulting Physician (Interventional Cardiology)  Siva Ley MD as Consulting Physician (Pain Medicine)    Outpatient Medications Prior to Visit   Medication Sig Dispense Refill    albuterol sulfate  (90 Base) MCG/ACT inhaler Inhale 2 puffs Every 4 (Four) Hours As Needed for Wheezing. 18 g 0    bumetanide (BUMEX) 1 MG tablet Take 1 tablet by mouth Daily.      carvedilol (COREG) 6.25 MG tablet Take 1 tablet by mouth 2 (Two) Times a Day With Meals. 180 tablet 1    Cholecalciferol 125 MCG (5000 UT) tablet Take 1 tablet by mouth Daily.      Eliquis 2.5 MG tablet tablet Take 1 tablet by mouth Every 12 (Twelve) Hours. 180 tablet 3    gabapentin (NEURONTIN) 100 MG capsule Take 1 capsule by mouth 3 (Three) Times a Day.      hydrALAZINE (APRESOLINE) 50 MG tablet Take 1 tablet by mouth 3 (Three) Times a Day. 270 tablet 1    ipratropium (ATROVENT HFA) 17 MCG/ACT inhaler Inhale 2 puffs 4 (Four) Times a Day. 12.9 g 11    lisinopril (PRINIVIL,ZESTRIL) 40 MG tablet Take 1 tablet by mouth Every Morning. 90 tablet 1    Multiple Vitamins-Minerals (PRESERVISION AREDS 2 PO) Take 1 capsule by mouth 2 (Two) Times a Day.      multivitamin (Multi-Vitamin Daily) tablet tablet  "Take 1 tablet by mouth Daily.      NIFEdipine XL (PROCARDIA XL) 30 MG 24 hr tablet Take 1 tablet by mouth Daily. 90 tablet 1    Omega-3 Fatty Acids (fish oil) 1200 MG capsule capsule Take 1 capsule by mouth Daily.      spironolactone (ALDACTONE) 12.5 MG tablet half tablet Take 1 half tablet by mouth Daily.       No facility-administered medications prior to visit.     No opioid medication identified on active medication list. I have reviewed chart for other potential  high risk medication/s and harmful drug interactions in the elderly.      Aspirin is not on active medication list.  Aspirin use is contraindicated for this patient due to: current use of Eliquis.  .    Patient Active Problem List   Diagnosis    AF (paroxysmal atrial fibrillation)    Acute on chronic diastolic CHF (congestive heart failure)    Essential hypertension    Nonrheumatic mitral valve regurgitation    Lumbar radiculopathy    Age-related physical debility    Loss of balance    Lumbar canal stenosis    Difficulty breathing    CKD (chronic kidney disease) stage 4, GFR 15-29 ml/min     Advance Care Planning Advance Directive is not on file.  ACP discussion was held with the patient during this visit. Patient has an advance directive (not in EMR), copy requested.            Objective   Vitals:    03/11/25 1037   BP: 122/78   Pulse: 68   Resp: 18   Temp: 97.7 °F (36.5 °C)   SpO2: 98%   Weight: 62.1 kg (137 lb)   Height: 154.9 cm (61\")   PainSc: 3    PainLoc: Back  Comment: up to 10 when walking, none when sitting       Estimated body mass index is 25.89 kg/m² as calculated from the following:    Height as of this encounter: 154.9 cm (61\").    Weight as of this encounter: 62.1 kg (137 lb).        Does the patient have evidence of cognitive impairment? No                                                                                               Health  Risk Assessment    Smoking Status:  Social History     Tobacco Use   Smoking Status Never "   Smokeless Tobacco Never     Alcohol Consumption:  Social History     Substance and Sexual Activity   Alcohol Use Not Currently       Fall Risk Screen  KATYADI Fall Risk Assessment was completed, and patient is at LOW risk for falls.Assessment completed on:3/11/2025    Depression Screening   Little interest or pleasure in doing things? Not at all   Feeling down, depressed, or hopeless? Not at all   PHQ-2 Total Score 0      Health Habits and Functional and Cognitive Screening:      3/11/2025    10:34 AM   Functional & Cognitive Status   Do you have difficulty preparing food and eating? No   Do you have difficulty bathing yourself, getting dressed or grooming yourself? No   Do you have difficulty using the toilet? No   Do you have difficulty moving around from place to place? No   Do you have trouble with steps or getting out of a bed or a chair? No   Current Diet Well Balanced Diet   Dental Exam Up to date   Eye Exam Up to date   Exercise (times per week) 7 times per week   Current Exercises Include Other   Do you need help using the phone?  No   Are you deaf or do you have serious difficulty hearing?  Yes   Do you need help to go to places out of walking distance? No   Do you need help shopping? No   Do you need help preparing meals?  No   Do you need help with housework?  No   Do you need help with laundry? No   Do you need help taking your medications? No   Do you need help managing money? No   Do you ever drive or ride in a car without wearing a seat belt? No   Have you felt unusual stress, anger or loneliness in the last month? No   Who do you live with? Child   If you need help, do you have trouble finding someone available to you? No   Have you been bothered in the last four weeks by sexual problems? No   Do you have difficulty concentrating, remembering or making decisions? Yes           Age-appropriate Screening Schedule:  Refer to the list below for future screening recommendations based on patient's age,  sex and/or medical conditions. Orders for these recommended tests are listed in the plan section. The patient has been provided with a written plan.    Health Maintenance List  Health Maintenance   Topic Date Due    DXA SCAN  Never done    TDAP/TD VACCINES (1 - Tdap) Never done    Pneumococcal Vaccine 50+ (2 of 2 - PPSV23) 12/22/2015    COVID-19 Vaccine (1 - 2024-25 season) 09/17/2025 (Originally 9/1/2024)    RSV Vaccine - Adults (1 - 1-dose 75+ series) 09/17/2025 (Originally 3/17/2009)    BMI FOLLOWUP  10/07/2025    ANNUAL WELLNESS VISIT  03/11/2026    INFLUENZA VACCINE  Completed    ZOSTER VACCINE  Completed                                                                                                                                                CMS Preventative Services Quick Reference  Risk Factors Identified During Encounter  Chronic Pain: OTC analgesics as needed. Proper dosing schedule discussed.   Seeing pain management      The above risks/problems have been discussed with the patient.  Pertinent information has been shared with the patient in the After Visit Summary.  An After Visit Summary and PPPS were made available to the patient.    Follow Up:   Next Medicare Wellness visit to be scheduled in 1 year.       Additional E&M Note during same encounter follows:  Patient has additional, significant, and separately identifiable condition(s)/problem(s) that require work above and beyond the Medicare Wellness Visit     Chief Complaint  Medicare Wellness-subsequent (Discuss lab results )    Subjective   ARIEL  Khushbu is also being seen today for an annual adult preventative physical exam.   She sees a pain clinic for management of pain. Back pain worse than 1 year ago. Left leg pain did improve with injections but back pain did not.     Meloxicam discontinued due to anemia, new in the last 3 months. Denies any red blood or melena in stool. No abdominal pain. Sleeping more than normal, very tired. She has been  "referred to GI for evaluation. Has been taking Arthrogenix supplement for around 3 weeks prior to labs but stopped it yesterday in case it contributed to anemia. Heartburn notable for a couple of months, worse in the last month. She takes Rolaids at times which helps some.         Objective   Vital Signs:  /78   Pulse 68   Temp 97.7 °F (36.5 °C)   Resp 18   Ht 154.9 cm (61\")   Wt 62.1 kg (137 lb)   SpO2 98%   BMI 25.89 kg/m²     Physical Exam  Vitals and nursing note reviewed.   Constitutional:       General: She is not in acute distress.     Appearance: She is well-developed. She is not ill-appearing, toxic-appearing or diaphoretic.   HENT:      Head: Normocephalic and atraumatic.   Eyes:      General: No scleral icterus.     Extraocular Movements: Extraocular movements intact.      Conjunctiva/sclera: Conjunctivae normal.      Pupils: Pupils are equal, round, and reactive to light.   Cardiovascular:      Rate and Rhythm: Normal rate and regular rhythm.      Heart sounds: Normal heart sounds. No murmur heard.     No friction rub. No gallop.   Pulmonary:      Effort: Pulmonary effort is normal. No respiratory distress.      Breath sounds: Normal breath sounds. No stridor. No wheezing, rhonchi or rales.   Chest:      Chest wall: No tenderness.   Abdominal:      General: Bowel sounds are normal. There is no distension.      Palpations: Abdomen is soft.      Tenderness: There is no abdominal tenderness. There is no right CVA tenderness, left CVA tenderness, guarding or rebound.   Musculoskeletal:         General: No deformity.      Cervical back: Normal range of motion and neck supple. No tenderness.      Right lower leg: No edema.      Left lower leg: No edema.   Lymphadenopathy:      Cervical: No cervical adenopathy.   Skin:     General: Skin is warm and dry.      Capillary Refill: Capillary refill takes less than 2 seconds.      Coloration: Skin is not jaundiced.      Findings: No rash.   Neurological: "      Mental Status: She is alert and oriented to person, place, and time. Mental status is at baseline.      Cranial Nerves: No cranial nerve deficit.      Sensory: No sensory deficit.      Motor: No abnormal muscle tone.      Gait: Gait abnormal (walker in use).   Psychiatric:         Mood and Affect: Mood normal.         Behavior: Behavior normal.         Thought Content: Thought content normal.         Judgment: Judgment normal.           The following data was reviewed by: Shavonne Duran PA-C on 03/11/2025:    CMP          7/25/2024    08:16 12/3/2024    09:30 3/7/2025    10:54   CMP   Glucose  178  129    BUN  67  65    Creatinine  1.76  1.69    EGFR  27.2  28.6    Sodium  140  143    Potassium  4.2  5.4    Chloride  102  109    Calcium  9.7  9.5    Total Protein  6.6  6.1    Albumin  4.3  3.5    Globulin  2.3  2.6    Total Bilirubin  0.4  <0.2    Alkaline Phosphatase  164  238    AST (SGOT) 21     20  22    ALT (SGPT) 27     33  51    Albumin/Globulin Ratio  1.9  1.3    BUN/Creatinine Ratio  38.1  38.5       Details          This result is from an external source.             CBC          7/25/2024    08:16 12/3/2024    09:30 3/7/2025    10:54   CBC   WBC  9.10  6.19    RBC  4.56  3.23    Hemoglobin 11.4     12.5  9.3    Hematocrit  40.0  29.5    MCV  87.7  91.3    MCH  27.4  28.8    MCHC  31.3  31.5    RDW  14.2  14.2    Platelets  313  279       Details          This result is from an external source.                  Assessment and Plan Additional age appropriate preventative wellness advice topics were discussed during today's preventative wellness exam(some topics already addressed during AWV portion of the note above):    Physical Activity: Advised cardiovascular activity 150 minutes per week as tolerated. (example brisk walk for 30 minutes, 5 days a week).   Nutrition: Discussed nutrition plan with patient. Information shared in after visit summary. Goal is for a well balanced diet to enhance overall  health.  Assessment & Plan  Encounter for subsequent annual wellness visit (AWV) in Medicare patient         Annual physical exam         Other iron deficiency anemia    Orders:    CBC (No Diff); Future    Comprehensive metabolic panel; Future    Ferritin; Future    Iron Profile; Future    TSH Rfx On Abnormal To Free T4; Future    CKD (chronic kidney disease) stage 4, GFR 15-29 ml/min  Renal condition is stable.  Continue current treatment regimen.  Renal condition will be reassessed in 3 months.    Orders:    Comprehensive metabolic panel; Future    TSH Rfx On Abnormal To Free T4; Future    Heartburn    Orders:    pantoprazole (Protonix) 40 MG EC tablet; Take 1 tablet by mouth Every Morning Before Breakfast.    Essential hypertension  Hypertension is stable and controlled  Continue current treatment regimen.  Blood pressure will be reassessed in 3 months.    Orders:    TSH Rfx On Abnormal To Free T4; Future    AF (paroxysmal atrial fibrillation)    Orders:    TSH Rfx On Abnormal To Free T4; Future    Screening for osteoporosis    Orders:    DEXA Bone Density Axial; Future    Postmenopausal    Orders:    DEXA Bone Density Axial; Future       Begin Protonix 40 mg daily while awaiting gastroenterology consultation.  Avoid NSAIDs including meloxicam.  Discuss the need to discontinue meloxicam with pain management during appointment later today.  Okay to take Tylenol as needed.    Follow Up:   Return in about 3 months (around 6/11/2025) for Next scheduled follow up.

## 2025-03-18 ENCOUNTER — OFFICE VISIT (OUTPATIENT)
Dept: CARDIOLOGY | Facility: CLINIC | Age: OVER 89
End: 2025-03-18
Payer: MEDICARE

## 2025-03-18 VITALS
OXYGEN SATURATION: 92 % | WEIGHT: 134.6 LBS | HEART RATE: 65 BPM | DIASTOLIC BLOOD PRESSURE: 58 MMHG | SYSTOLIC BLOOD PRESSURE: 128 MMHG | HEIGHT: 61 IN | BODY MASS INDEX: 25.41 KG/M2

## 2025-03-18 DIAGNOSIS — I48.0 AF (PAROXYSMAL ATRIAL FIBRILLATION): ICD-10-CM

## 2025-03-18 DIAGNOSIS — I50.33 ACUTE ON CHRONIC DIASTOLIC CHF (CONGESTIVE HEART FAILURE): Primary | ICD-10-CM

## 2025-03-18 DIAGNOSIS — I10 ESSENTIAL HYPERTENSION: ICD-10-CM

## 2025-03-18 NOTE — PROGRESS NOTES
[facility] Cardiology  Office Progress Note  Khushbu Bledsoe  3/17/1934  [address] [address]       Visit Date: 03/18/25    PCP: Ana Ross MD  [address]  [facility] [address]    IDENTIFICATION: A 89 y.o. female,  originally from [facility] cardio: [place], [place]    Problem List:  PAF, CV 4, on Eliquis  VHD/moderate pulm HTN  2019 low risk stress test  2019 EF normal, Calcific MV, moderate MR, mild MS, RVSP 47  2/24 Echo (OSH): EF 60-65%, grade II diastolic dysfunction, mild to moderate MR, RVSP 32  2/25 Echo: EF 63.9%, PFO with left-right shunting, mild MAC, mild MR, no MS, RVSP 40  HTN  HLD  Surgical history:  Lumbar discectomy  Blepharoptosis repair  Tonsillectomy      CC:   Chief Complaint   Patient presents with    AF (paroxysmal atrial fibrillation)     Patient states over the last couple months she's been SOA after walking         Allergies  No Known Allergies    Current Medications  Current Outpatient Medications   Medication Instructions    albuterol sulfate  (90 Base) MCG/ACT inhaler 2 puffs, Inhalation, Every 4 Hours PRN    bumetanide (BUMEX) 1 mg, Daily    carvedilol (COREG) 6.25 mg, Oral, 2 Times Daily With Meals    Cholecalciferol 5,000 Units, Daily    Eliquis 2.5 mg, Oral, Every 12 Hours Scheduled    fish oil 1,200 mg, Daily    gabapentin (NEURONTIN) 100 mg, 3 Times Daily    hydrALAZINE (APRESOLINE) 50 mg, Oral, 3 Times Daily    ipratropium (ATROVENT HFA) 17 MCG/ACT inhaler 2 puffs, Inhalation, 4 Times Daily - RT    lisinopril (PRINIVIL,ZESTRIL) 40 mg, Oral, Every Morning    Multiple Vitamins-Minerals (PRESERVISION AREDS 2 PO) 1 capsule, 2 Times Daily    multivitamin (Multi-Vitamin Daily) tablet tablet 1 tablet, Daily    NIFEdipine XL (PROCARDIA XL) 30 mg, Oral, Daily    pantoprazole (PROTONIX) 40 mg, Oral, Every Morning Before Breakfast    spironolactone (ALDACTONE) 12.5 mg, Daily        History of Present Illness   Khushbu CRAMER  "Rakan is a 89 y.o. year old female here for follow up.  Occasional palpitation.  She walks with a rollator walker.  She is moved here from [facility] to be around family      OBJECTIVE:  Vitals:    03/18/25 1119   BP: 128/58   BP Location: Left arm   Patient Position: Sitting   Cuff Size: Adult   Pulse: 65   SpO2: 92%   Weight: 61.1 kg (134 lb 9.6 oz)   Height: 154.9 cm (61\")     Body mass index is 25.43 kg/m².    Constitutional:       Appearance: Healthy appearance. Not in distress.   Neck:      Vascular: No JVR. JVD normal.   Pulmonary:      Effort: Pulmonary effort is normal.      Breath sounds: Normal breath sounds. No wheezing. No rhonchi. No rales.   Chest:      Chest wall: Not tender to palpatation.   Cardiovascular:      PMI at left midclavicular line. Normal rate. Regular rhythm. Normal S1. Normal S2.       Murmurs: There is a systolic murmur.      No gallop.  No click. No rub.   Pulses:     Intact distal pulses.   Edema:     Peripheral edema absent.   Abdominal:      General: Bowel sounds are normal.      Palpations: Abdomen is soft.      Tenderness: There is no abdominal tenderness.   Musculoskeletal: Normal range of motion.         General: No tenderness. Skin:     General: Skin is warm and dry.   Neurological:      General: No focal deficit present.      Mental Status: Alert and oriented to person, place and time.         Diagnostic Data:  Procedures    Advance Care Planning   ACP discussion was held with the patient during this visit. Patient has an advance directive (not in EMR), copy requested.         ASSESSMENT:   Diagnosis Plan   1. Acute on chronic diastolic CHF (congestive heart failure)        2. AF (paroxysmal atrial fibrillation)        3. Essential hypertension            PLAN:  HFpEF-controlled on current regimen    Paroxysmal A-fib maintaining sinus mechanism and anticoagulated on Eliquis low-dose    Hypertension controlled on combination medication        Prosper Mesa MD, FACC  "

## 2025-04-07 DIAGNOSIS — R12 HEARTBURN: ICD-10-CM

## 2025-04-07 RX ORDER — PANTOPRAZOLE SODIUM 40 MG/1
40 TABLET, DELAYED RELEASE ORAL
Qty: 30 TABLET | Refills: 0 | Status: SHIPPED | OUTPATIENT
Start: 2025-04-07

## 2025-04-25 ENCOUNTER — TELEPHONE (OUTPATIENT)
Dept: CARDIOLOGY | Facility: CLINIC | Age: OVER 89
End: 2025-04-25
Payer: MEDICARE

## 2025-04-25 RX ORDER — CARVEDILOL 6.25 MG/1
6.25 TABLET ORAL 2 TIMES DAILY WITH MEALS
Qty: 180 TABLET | Refills: 1 | Status: SHIPPED | OUTPATIENT
Start: 2025-04-25

## 2025-04-25 RX ORDER — CARVEDILOL 6.25 MG/1
6.25 TABLET ORAL 2 TIMES DAILY WITH MEALS
Qty: 180 TABLET | Refills: 1 | Status: SHIPPED | OUTPATIENT
Start: 2025-04-25 | End: 2025-04-25 | Stop reason: SDUPTHER

## 2025-04-25 RX ORDER — LISINOPRIL 40 MG/1
40 TABLET ORAL EVERY MORNING
Qty: 90 TABLET | Refills: 1 | Status: SHIPPED | OUTPATIENT
Start: 2025-04-25 | End: 2025-04-25 | Stop reason: SDUPTHER

## 2025-04-25 RX ORDER — LISINOPRIL 40 MG/1
40 TABLET ORAL EVERY MORNING
Qty: 90 TABLET | Refills: 1 | Status: SHIPPED | OUTPATIENT
Start: 2025-04-25

## 2025-04-25 NOTE — TELEPHONE ENCOUNTER
Pt seen recently in the ED in [place] for back pain , otherwise doing well from a cardiac standpoint . Request is to hold eliquis 2.5 mg  72 hours prior to surgery .      3/18/25 - IDENTIFICATION: A 89 y.o. female,  originally from [place]  Prev cardio: [place], [place]     Problem List:  PAF, CV 4, on Eliquis  VHD/moderate pulm HTN  2019 low risk stress test  2019 EF normal, Calcific MV, moderate MR, mild MS, RVSP 47  2/24 Echo (OSH): EF 60-65%, grade II diastolic dysfunction, mild to moderate MR, RVSP 32  2/25 Echo: EF 63.9%, PFO with left-right shunting, mild MAC, mild MR, no MS, RVSP 40  HTN  HLD  Surgical history:  Lumbar discectomy  Blepharoptosis repair  Tonsillectomy

## 2025-04-25 NOTE — TELEPHONE ENCOUNTER
Caller: CELIA CUELLAR    Relationship: Emergency Contact    Best call back number: [phone]    What is the best time to reach you: ANYTIME    Who are you requesting to speak with (clinical staff, provider,  specific staff member): CLINICAL       What was the call regarding:  PT PREVIOUSLY SAW  FOR HER MEDICATIONS. HE IS NO LONGER WITH THE PRACTICE AND SHE IS NEEDING REFILLS ON CARVEDILOL AND LISINOPRIL (SHE IS OUT OF THESE). SHE ALSO WOULD LIKE TO SWITCH ALL PRESCRIPTIONS FROM DR AGUIRRE TO DR LYNN, IF POSSIBLE.    SHE GETS HER PRESCRIPTIONS THROUGH MAIL ORDER.     Is it okay if the provider responds through [employer]: PLEASE CALL CELIA, THANK YOU

## 2025-04-25 NOTE — TELEPHONE ENCOUNTER
"REQUEST FOR CARDIAC CLEARANCE    Caller name: CELIA CUELLAR     Phone Number:  [phone]    Surgeon's name:  LAURA GOMES- DR ASHLEY MORALES    Type of planned surgery: HIP SURGERY    Date of planned surgery: HAS NOT BEEN SCHEDULED, WITHIN 4 WEEKS     Type of anesthesia: SPINAL BLOCK IF THEY CAN, IF NOT THEN \"TWILIGHT\"     Have you been experiencing chest pain or shortness of breath?  NO    Is your doctor requesting for you to stop any of your medications prior to your surgery? ELIQUIS      Where should we fax the clearance to?  [phone]     "

## 2025-04-28 DIAGNOSIS — R12 HEARTBURN: ICD-10-CM

## 2025-04-28 RX ORDER — PANTOPRAZOLE SODIUM 40 MG/1
40 TABLET, DELAYED RELEASE ORAL
Qty: 90 TABLET | Refills: 1 | Status: SHIPPED | OUTPATIENT
Start: 2025-04-28

## 2025-05-07 ENCOUNTER — PRE-ADMISSION TESTING (OUTPATIENT)
Dept: PREADMISSION TESTING | Facility: HOSPITAL | Age: OVER 89
End: 2025-05-07
Payer: MEDICARE

## 2025-05-07 VITALS — BODY MASS INDEX: 26.45 KG/M2 | WEIGHT: 134.7 LBS | HEIGHT: 60 IN

## 2025-05-07 LAB
25(OH)D3 SERPL-MCNC: 77.8 NG/ML (ref 30–100)
ALBUMIN SERPL-MCNC: 4.3 G/DL (ref 3.5–5.2)
ALBUMIN/GLOB SERPL: 1.5 G/DL
ALP SERPL-CCNC: 179 U/L (ref 39–117)
ALT SERPL W P-5'-P-CCNC: 21 U/L (ref 1–33)
ANION GAP SERPL CALCULATED.3IONS-SCNC: 14 MMOL/L (ref 5–15)
APTT PPP: 47.2 SECONDS (ref 22–39)
AST SERPL-CCNC: 22 U/L (ref 1–32)
BASOPHILS # BLD AUTO: 0.06 10*3/MM3 (ref 0–0.2)
BASOPHILS NFR BLD AUTO: 0.7 % (ref 0–1.5)
BILIRUB SERPL-MCNC: 0.2 MG/DL (ref 0–1.2)
BUN SERPL-MCNC: 65 MG/DL (ref 8–23)
BUN/CREAT SERPL: 35.3 (ref 7–25)
CALCIUM SPEC-SCNC: 9.5 MG/DL (ref 8.2–9.6)
CHLORIDE SERPL-SCNC: 99 MMOL/L (ref 98–107)
CO2 SERPL-SCNC: 23 MMOL/L (ref 22–29)
CREAT SERPL-MCNC: 1.84 MG/DL (ref 0.57–1)
CRP SERPL-MCNC: 0.85 MG/DL (ref 0–0.5)
DEPRECATED RDW RBC AUTO: 46.7 FL (ref 37–54)
EGFRCR SERPLBLD CKD-EPI 2021: 25.6 ML/MIN/1.73
EOSINOPHIL # BLD AUTO: 0.1 10*3/MM3 (ref 0–0.4)
EOSINOPHIL NFR BLD AUTO: 1.2 % (ref 0.3–6.2)
ERYTHROCYTE [DISTWIDTH] IN BLOOD BY AUTOMATED COUNT: 14.6 % (ref 12.3–15.4)
ERYTHROCYTE [SEDIMENTATION RATE] IN BLOOD: 58 MM/HR (ref 0–30)
GLOBULIN UR ELPH-MCNC: 2.9 GM/DL
GLUCOSE SERPL-MCNC: 198 MG/DL (ref 65–99)
HBA1C MFR BLD: 6.5 % (ref 4.8–5.6)
HCT VFR BLD AUTO: 35.3 % (ref 34–46.6)
HGB BLD-MCNC: 10.9 G/DL (ref 12–15.9)
IMM GRANULOCYTES # BLD AUTO: 0.06 10*3/MM3 (ref 0–0.05)
IMM GRANULOCYTES NFR BLD AUTO: 0.7 % (ref 0–0.5)
INR PPP: 1.19 (ref 0.89–1.12)
LYMPHOCYTES # BLD AUTO: 3.25 10*3/MM3 (ref 0.7–3.1)
LYMPHOCYTES NFR BLD AUTO: 38.3 % (ref 19.6–45.3)
MCH RBC QN AUTO: 26.9 PG (ref 26.6–33)
MCHC RBC AUTO-ENTMCNC: 30.9 G/DL (ref 31.5–35.7)
MCV RBC AUTO: 87.2 FL (ref 79–97)
MONOCYTES # BLD AUTO: 0.63 10*3/MM3 (ref 0.1–0.9)
MONOCYTES NFR BLD AUTO: 7.4 % (ref 5–12)
NEUTROPHILS NFR BLD AUTO: 4.39 10*3/MM3 (ref 1.7–7)
NEUTROPHILS NFR BLD AUTO: 51.7 % (ref 42.7–76)
NRBC BLD AUTO-RTO: 0 /100 WBC (ref 0–0.2)
PLATELET # BLD AUTO: 332 10*3/MM3 (ref 140–450)
PMV BLD AUTO: 8.8 FL (ref 6–12)
POTASSIUM SERPL-SCNC: 4.1 MMOL/L (ref 3.5–5.2)
PROT SERPL-MCNC: 7.2 G/DL (ref 6–8.5)
PROTHROMBIN TIME: 15.9 SECONDS (ref 12.2–15.3)
QT INTERVAL: 458 MS
QTC INTERVAL: 497 MS
RBC # BLD AUTO: 4.05 10*6/MM3 (ref 3.77–5.28)
SODIUM SERPL-SCNC: 136 MMOL/L (ref 136–145)
WBC NRBC COR # BLD AUTO: 8.49 10*3/MM3 (ref 3.4–10.8)

## 2025-05-07 PROCEDURE — 82306 VITAMIN D 25 HYDROXY: CPT

## 2025-05-07 PROCEDURE — 36415 COLL VENOUS BLD VENIPUNCTURE: CPT

## 2025-05-07 PROCEDURE — 86140 C-REACTIVE PROTEIN: CPT

## 2025-05-07 PROCEDURE — 82985 ASSAY OF GLYCATED PROTEIN: CPT

## 2025-05-07 PROCEDURE — 93005 ELECTROCARDIOGRAM TRACING: CPT

## 2025-05-07 PROCEDURE — 85730 THROMBOPLASTIN TIME PARTIAL: CPT

## 2025-05-07 PROCEDURE — 87081 CULTURE SCREEN ONLY: CPT

## 2025-05-07 PROCEDURE — 85025 COMPLETE CBC W/AUTO DIFF WBC: CPT

## 2025-05-07 PROCEDURE — G0480 DRUG TEST DEF 1-7 CLASSES: HCPCS

## 2025-05-07 PROCEDURE — 85652 RBC SED RATE AUTOMATED: CPT

## 2025-05-07 PROCEDURE — 85610 PROTHROMBIN TIME: CPT

## 2025-05-07 PROCEDURE — 80053 COMPREHEN METABOLIC PANEL: CPT

## 2025-05-07 PROCEDURE — 83036 HEMOGLOBIN GLYCOSYLATED A1C: CPT

## 2025-05-07 NOTE — PAT
An arrival time for procedure was not provided during PAT visit. If patient had any questions or concerns about their arrival time, they were instructed to contact their surgeon/physician.  Additionally, if the patient referred to an arrival time that was acquired from their my chart account, patient was encouraged to verify that time with their surgeon/physician. Arrival times are NOT provided in Pre Admission Testing Department.    Per Anesthesia Request, patient instructed not to take their ACE/ARB medications on the AM of surgery.    Patient denies any current skin issues.     Patient to apply Chlorhexadine wipes  to surgical area (as instructed) the night before procedure and the AM of procedure. Wipes provided.    Patient viewed general PAT education video as instructed in their preoperative information received from their surgeon.  Patient stated the general PAT education video was viewed in its entirety and survey completed.  Copies of PAT general education handouts (Incentive Spirometry, Meds to Beds Program, Patient Belongings, Pre-op skin preparation instructions, Blood Glucose testing, Visitor policy, Surgery FAQ, Code H) distributed to patient if not printed. Education related to the PAT pass and skin preparation for surgery (if applicable) completed in PAT as a reinforcement to PAT education video. Patient instructed to return PAT pass provided today as well as completed skin preparation sheet (if applicable) on the day of procedure.     Additionally if patient had not viewed video yet but intended to view it at home or in our waiting area, then referred them to the handout with QR code/link provided during PAT visit.  Encouraged patient/family to read PAT general education handouts thoroughly and notify PAT staff with any questions or concerns. Patient verbalized understanding of all information and priority content.    Discussed with patient options for receiving total joint replacement education and  assessed patient's ability and preference. Joint Replacement Guide given to patient during PAT visit since not received a copy within the last year. Encouraged patient/family to read guide thoroughly and notify PAT staff with any questions or concerns. Handout provided directing patient to links to watch online videos related to joint replacement surgery on the [facility] website. The handout gives detailed instructions for joining an online joint replacement class through Microsoft Teams or phone conference offered on the 1st and 3rd Thursdays of the month. Patient agreed to participate by watching videos online. Patient verbalized understanding of instructions. Encouraged to share information with family and/or . An overview of the joint replacement education was provided during the visit including general perioperative instructions that are routine for all surgical patients (PAT PASS, wipes, directions to pre-op, etc.).    Cardiac risk assessment on chart from 4/25/25.    Clean catch urinalysis not indicated because patient denied recent urinary frequency, urinary urgency, burning/pain upon urination, or flank pain. No recent UTIs.

## 2025-05-08 LAB
FRUCTOSAMINE SERPL-SCNC: 327 UMOL/L (ref 0–285)
MRSA SPEC QL CULT: NORMAL

## 2025-05-12 ENCOUNTER — OFFICE VISIT (OUTPATIENT)
Dept: INTERNAL MEDICINE | Facility: CLINIC | Age: OVER 89
End: 2025-05-12
Payer: MEDICARE

## 2025-05-12 VITALS
DIASTOLIC BLOOD PRESSURE: 66 MMHG | HEART RATE: 63 BPM | HEIGHT: 60 IN | SYSTOLIC BLOOD PRESSURE: 114 MMHG | WEIGHT: 134 LBS | TEMPERATURE: 97.9 F | RESPIRATION RATE: 18 BRPM | OXYGEN SATURATION: 98 % | BODY MASS INDEX: 26.31 KG/M2

## 2025-05-12 DIAGNOSIS — R54 AGE-RELATED PHYSICAL DEBILITY: ICD-10-CM

## 2025-05-12 DIAGNOSIS — I50.33 ACUTE ON CHRONIC DIASTOLIC CHF (CONGESTIVE HEART FAILURE): ICD-10-CM

## 2025-05-12 DIAGNOSIS — R73.9 HYPERGLYCEMIA: Primary | ICD-10-CM

## 2025-05-12 DIAGNOSIS — I10 ESSENTIAL HYPERTENSION: ICD-10-CM

## 2025-05-12 DIAGNOSIS — I48.0 AF (PAROXYSMAL ATRIAL FIBRILLATION): ICD-10-CM

## 2025-05-12 DIAGNOSIS — N18.4 CKD (CHRONIC KIDNEY DISEASE) STAGE 4, GFR 15-29 ML/MIN: ICD-10-CM

## 2025-05-12 DIAGNOSIS — M16.11 ARTHRITIS OF RIGHT HIP: ICD-10-CM

## 2025-05-12 PROCEDURE — 99214 OFFICE O/P EST MOD 30 MIN: CPT | Performed by: STUDENT IN AN ORGANIZED HEALTH CARE EDUCATION/TRAINING PROGRAM

## 2025-05-12 PROCEDURE — 1125F AMNT PAIN NOTED PAIN PRSNT: CPT | Performed by: STUDENT IN AN ORGANIZED HEALTH CARE EDUCATION/TRAINING PROGRAM

## 2025-05-12 PROCEDURE — G2211 COMPLEX E/M VISIT ADD ON: HCPCS | Performed by: STUDENT IN AN ORGANIZED HEALTH CARE EDUCATION/TRAINING PROGRAM

## 2025-05-12 NOTE — PROGRESS NOTES
Office Note     Name: Khushbu Bledsoe    : 3/17/1934     MRN: [id number]     Chief Complaint  Hyperglycemia (Ortho advised patient to check with PCP about A1c of 6.5%)    Subjective     History of Present Illness:  Khushbu Bledsoe is a 89 y.o. female who presents today for chronic conditions and high A1c.    Hypertension on carvedilol, lisinopril, hydralazine, nifedipine, spironolactone  Paroxysmal atrial fibrillation on carvedilol, Eliquis  CHF on Bumex, spironolactone  Hyperlipidemia on fatty acids  GERD on pantoprazole     Lumbar spinal canal stenosis, severe foraminal narrowing at L3-L4, multilevel degenerative changes  CKD 4: stable, not on NSAIDs    About to have R hip replacement, ortho advised about concerns on high A1c at 6.5%    Family History:   Family History   Problem Relation Age of Onset    Heart attack Brother     Cancer Son     Arthritis Son     Heart disease Son        Social History:   Social History     Socioeconomic History    Marital status:    Tobacco Use    Smoking status: Never    Smokeless tobacco: Never   Vaping Use    Vaping status: Never Used   Substance and Sexual Activity    Alcohol use: Not Currently    Drug use: Never    Sexual activity: Not Currently     Partners: Male     Birth control/protection: None       Health Maintenance   Topic Date Due    TDAP/TD VACCINES (1 - Tdap) Never done    Pneumococcal Vaccine 50+ (2 of 2 - PPSV23) 2015    DXA SCAN  2018    COVID-19 Vaccine ( - - season) 2025 (Originally 2024)    RSV Vaccine - Adults (1 - 1-dose 75+ series) 2025 (Originally 3/17/2009)    INFLUENZA VACCINE  2025    ANNUAL WELLNESS VISIT  2026    ZOSTER VACCINE  Completed       Patient Care Team:  Ana Ross MD as PCP - General (Family Medicine)  Van Ch MD as Consulting Physician (Interventional Cardiology)  Siva Ley MD as Consulting Physician (Pain Medicine)    Objective     Vital  "Signs  /66   Pulse 63   Temp 97.9 °F (36.6 °C) (Infrared)   Resp 18   Ht 152.4 cm (60\")   Wt 60.8 kg (134 lb)   SpO2 98%   BMI 26.17 kg/m²   Estimated body mass index is 26.17 kg/m² as calculated from the following:    Height as of this encounter: 152.4 cm (60\").    Weight as of this encounter: 60.8 kg (134 lb).        Physical Exam  Vitals reviewed.   HENT:      Head: Normocephalic.   Pulmonary:      Effort: Pulmonary effort is normal. No respiratory distress.   Neurological:      Mental Status: She is alert.          Procedures     Assessment and Plan     Diagnoses and all orders for this visit:    1. Hyperglycemia (Primary)  Assessment & Plan:  A1c of 6.5% is normal and at goal for age. Would recommend to closely monitor glucose level during procedure and sliding scale insulin as recommended. Otherwise, no need for longterm diabetes work up for now      2. CKD (chronic kidney disease) stage 4, GFR 15-29 ml/min  Assessment & Plan:  Stable at this time  On lisinopril  BP controlled, glucose controlled  Avoid dehydration      3. Arthritis of right hip  Assessment & Plan:  Plans for right hip replacement      4. Age-related physical debility  Assessment & Plan:   fall precautions advised  Continue walker      5. AF (paroxysmal atrial fibrillation)    6. Essential hypertension    7. Acute on chronic diastolic CHF (congestive heart failure)       A-fib, hypertension, congestive heart failure  - follows with cardiology  - On carvedilol, lisinopril, nifedipine, spironolactone  -On Eliquis, advised to hold prior to surgery    Counseling was given to patient and family for the following topics: instructions for management, risks and benefits of treatment options, and importance of treatment compliance.    Follow Up  No follow-ups on file.    MD SAMANTHA Wilcox [facility]  [address] [place] [id number] [phone]  "

## 2025-05-13 ENCOUNTER — ANESTHESIA EVENT (OUTPATIENT)
Dept: PERIOP | Facility: HOSPITAL | Age: OVER 89
End: 2025-05-13
Payer: MEDICARE

## 2025-05-13 RX ORDER — FAMOTIDINE 10 MG/ML
20 INJECTION, SOLUTION INTRAVENOUS ONCE
Status: CANCELLED | OUTPATIENT
Start: 2025-05-13 | End: 2025-05-13

## 2025-05-14 ENCOUNTER — ANESTHESIA (OUTPATIENT)
Dept: PERIOP | Facility: HOSPITAL | Age: OVER 89
End: 2025-05-14
Payer: MEDICARE

## 2025-05-14 ENCOUNTER — ANESTHESIA EVENT CONVERTED (OUTPATIENT)
Dept: ANESTHESIOLOGY | Facility: HOSPITAL | Age: OVER 89
End: 2025-05-14
Payer: MEDICARE

## 2025-05-14 ENCOUNTER — APPOINTMENT (OUTPATIENT)
Dept: GENERAL RADIOLOGY | Facility: HOSPITAL | Age: OVER 89
End: 2025-05-14
Payer: MEDICARE

## 2025-05-14 ENCOUNTER — HOSPITAL ENCOUNTER (OUTPATIENT)
Facility: HOSPITAL | Age: OVER 89
Discharge: NURSING, REHAB OR LONG-TERM CARE | End: 2025-05-18
Attending: ORTHOPAEDIC SURGERY | Admitting: ORTHOPAEDIC SURGERY
Payer: MEDICARE

## 2025-05-14 DIAGNOSIS — Z96.641 STATUS POST TOTAL REPLACEMENT OF RIGHT HIP: Primary | ICD-10-CM

## 2025-05-14 PROBLEM — I50.9 CHF (CONGESTIVE HEART FAILURE): Status: ACTIVE | Noted: 2025-05-14

## 2025-05-14 PROBLEM — R73.09 ELEVATED HEMOGLOBIN A1C: Status: ACTIVE | Noted: 2025-05-14

## 2025-05-14 LAB
APTT PPP: 41 SECONDS (ref 22–39)
COTININE SERPL-MCNC: <1 NG/ML
INR PPP: 1.06 (ref 0.89–1.12)
NICOTINE SERPL-MCNC: <1 NG/ML
PROTHROMBIN TIME: 14.4 SECONDS (ref 12.2–15.3)

## 2025-05-14 PROCEDURE — 63710000001 GABAPENTIN 100 MG CAPSULE: Performed by: INTERNAL MEDICINE

## 2025-05-14 PROCEDURE — C1776 JOINT DEVICE (IMPLANTABLE): HCPCS | Performed by: ORTHOPAEDIC SURGERY

## 2025-05-14 PROCEDURE — 25010000002 HYDROMORPHONE 1 MG/ML SOLUTION

## 2025-05-14 PROCEDURE — 25010000002 LIDOCAINE PF 1% 1 % SOLUTION: Performed by: NURSE ANESTHETIST, CERTIFIED REGISTERED

## 2025-05-14 PROCEDURE — C1713 ANCHOR/SCREW BN/BN,TIS/BN: HCPCS | Performed by: ORTHOPAEDIC SURGERY

## 2025-05-14 PROCEDURE — 25010000002 FENTANYL CITRATE (PF) 50 MCG/ML SOLUTION

## 2025-05-14 PROCEDURE — 25010000002 SUGAMMADEX 200 MG/2ML SOLUTION: Performed by: NURSE ANESTHETIST, CERTIFIED REGISTERED

## 2025-05-14 PROCEDURE — 63710000001 OXYCODONE 10 MG TABLET

## 2025-05-14 PROCEDURE — 25010000002 BUPIVACAINE (PF) 0.25 % SOLUTION 30 ML VIAL: Performed by: ORTHOPAEDIC SURGERY

## 2025-05-14 PROCEDURE — 25010000002 ONDANSETRON PER 1 MG: Performed by: NURSE ANESTHETIST, CERTIFIED REGISTERED

## 2025-05-14 PROCEDURE — 94640 AIRWAY INHALATION TREATMENT: CPT

## 2025-05-14 PROCEDURE — 85730 THROMBOPLASTIN TIME PARTIAL: CPT | Performed by: ORTHOPAEDIC SURGERY

## 2025-05-14 PROCEDURE — A9270 NON-COVERED ITEM OR SERVICE: HCPCS | Performed by: INTERNAL MEDICINE

## 2025-05-14 PROCEDURE — 97162 PT EVAL MOD COMPLEX 30 MIN: CPT

## 2025-05-14 PROCEDURE — 63710000001 ACETAMINOPHEN EXTRA STRENGTH 500 MG TABLET: Performed by: ORTHOPAEDIC SURGERY

## 2025-05-14 PROCEDURE — A9270 NON-COVERED ITEM OR SERVICE: HCPCS | Performed by: ORTHOPAEDIC SURGERY

## 2025-05-14 PROCEDURE — 25010000002 ONDANSETRON PER 1 MG: Performed by: INTERNAL MEDICINE

## 2025-05-14 PROCEDURE — 25010000002 VANCOMYCIN 1 G RECONSTITUTED SOLUTION: Performed by: ORTHOPAEDIC SURGERY

## 2025-05-14 PROCEDURE — 25010000002 KETOROLAC TROMETHAMINE PER 15 MG: Performed by: ORTHOPAEDIC SURGERY

## 2025-05-14 PROCEDURE — 25010000002 DEXAMETHASONE PER 1 MG: Performed by: NURSE ANESTHETIST, CERTIFIED REGISTERED

## 2025-05-14 PROCEDURE — 85610 PROTHROMBIN TIME: CPT | Performed by: ANESTHESIOLOGY

## 2025-05-14 PROCEDURE — 73502 X-RAY EXAM HIP UNI 2-3 VIEWS: CPT

## 2025-05-14 PROCEDURE — 25010000002 LIDOCAINE 1% - EPINEPHRINE 1:100000 1 %-1:100000 SOLUTION 50 ML VIAL: Performed by: ORTHOPAEDIC SURGERY

## 2025-05-14 PROCEDURE — 76000 FLUOROSCOPY <1 HR PHYS/QHP: CPT

## 2025-05-14 PROCEDURE — 25010000002 CEFAZOLIN PER 500 MG: Performed by: ORTHOPAEDIC SURGERY

## 2025-05-14 PROCEDURE — A9270 NON-COVERED ITEM OR SERVICE: HCPCS

## 2025-05-14 PROCEDURE — 25010000002 PHENYLEPHRINE 10 MG/ML SOLUTION 1 ML VIAL: Performed by: NURSE ANESTHETIST, CERTIFIED REGISTERED

## 2025-05-14 PROCEDURE — 25010000002 LIDOCAINE PF 1% 1 % SOLUTION: Performed by: ANESTHESIOLOGY

## 2025-05-14 PROCEDURE — 25810000003 LACTATED RINGERS PER 1000 ML: Performed by: ANESTHESIOLOGY

## 2025-05-14 PROCEDURE — 25010000002 PROPOFOL 10 MG/ML EMULSION: Performed by: NURSE ANESTHETIST, CERTIFIED REGISTERED

## 2025-05-14 PROCEDURE — 25810000003 LACTATED RINGERS PER 1000 ML: Performed by: ORTHOPAEDIC SURGERY

## 2025-05-14 PROCEDURE — 25010000002 CLONIDINE PER 1 MG: Performed by: ORTHOPAEDIC SURGERY

## 2025-05-14 PROCEDURE — 27130 TOTAL HIP ARTHROPLASTY: CPT | Performed by: PHYSICIAN ASSISTANT

## 2025-05-14 DEVICE — VIOLET ANTIBACTERIAL POLYDIOXANONE, KNOTLESS TISSUE CONTROL DEVICE
Type: IMPLANTABLE DEVICE | Site: HIP | Status: FUNCTIONAL
Brand: STRATAFIX

## 2025-05-14 DEVICE — TOBRA FULL DOSE ANTIBIOTIC BONE CEMENT, 10 PACK CATALOG NUMBER IS 6197-9-010
Type: IMPLANTABLE DEVICE | Site: HIP | Status: FUNCTIONAL
Brand: SIMPLEX

## 2025-05-14 DEVICE — 6.5MM LOW PROFILE HEX SCREW 40MM
Type: IMPLANTABLE DEVICE | Site: HIP | Status: FUNCTIONAL
Brand: TRIDENT II

## 2025-05-14 DEVICE — TRIDENT X3 0 DEGREE POLYETHYLENE INSERT
Type: IMPLANTABLE DEVICE | Site: HIP | Status: FUNCTIONAL
Brand: TRIDENT X3 INSERT

## 2025-05-14 DEVICE — CERAMIC V40 FEMORAL HEAD
Type: IMPLANTABLE DEVICE | Site: HIP | Status: FUNCTIONAL
Brand: BIOLOX

## 2025-05-14 DEVICE — EXETER STEM V40
Type: IMPLANTABLE DEVICE | Site: HIP | Status: FUNCTIONAL
Brand: EXETER

## 2025-05-14 DEVICE — IMPLANTABLE DEVICE: Type: IMPLANTABLE DEVICE | Site: HIP | Status: FUNCTIONAL

## 2025-05-14 DEVICE — WHITE BRAIDED POLYETHYLENE SIZE 2 38" HC-5 NEEDLE BIOMET
Type: IMPLANTABLE DEVICE | Site: HIP | Status: FUNCTIONAL
Brand: TELEFLEX

## 2025-05-14 DEVICE — 2.5 I M PLUG
Type: IMPLANTABLE DEVICE | Site: HIP | Status: FUNCTIONAL
Brand: EXETER

## 2025-05-14 DEVICE — TRIDENT II TRITANIUM CLUSTER 46C
Type: IMPLANTABLE DEVICE | Site: HIP | Status: FUNCTIONAL
Brand: TRIDENT II

## 2025-05-14 RX ORDER — LIDOCAINE HYDROCHLORIDE 10 MG/ML
INJECTION, SOLUTION EPIDURAL; INFILTRATION; INTRACAUDAL; PERINEURAL AS NEEDED
Status: DISCONTINUED | OUTPATIENT
Start: 2025-05-14 | End: 2025-05-14 | Stop reason: SURG

## 2025-05-14 RX ORDER — FENTANYL CITRATE 50 UG/ML
50 INJECTION, SOLUTION INTRAMUSCULAR; INTRAVENOUS
Status: DISCONTINUED | OUTPATIENT
Start: 2025-05-14 | End: 2025-05-14 | Stop reason: HOSPADM

## 2025-05-14 RX ORDER — PROPOFOL 10 MG/ML
VIAL (ML) INTRAVENOUS AS NEEDED
Status: DISCONTINUED | OUTPATIENT
Start: 2025-05-14 | End: 2025-05-14 | Stop reason: SURG

## 2025-05-14 RX ORDER — POLYETHYLENE GLYCOL 3350 17 G/17G
17 POWDER, FOR SOLUTION ORAL DAILY PRN
Status: DISCONTINUED | OUTPATIENT
Start: 2025-05-14 | End: 2025-05-18 | Stop reason: HOSPADM

## 2025-05-14 RX ORDER — DEXAMETHASONE SODIUM PHOSPHATE 4 MG/ML
INJECTION, SOLUTION INTRA-ARTICULAR; INTRALESIONAL; INTRAMUSCULAR; INTRAVENOUS; SOFT TISSUE AS NEEDED
Status: DISCONTINUED | OUTPATIENT
Start: 2025-05-14 | End: 2025-05-14 | Stop reason: SURG

## 2025-05-14 RX ORDER — TRAMADOL HYDROCHLORIDE 50 MG/1
50 TABLET ORAL EVERY 12 HOURS PRN
Status: DISCONTINUED | OUTPATIENT
Start: 2025-05-14 | End: 2025-05-17

## 2025-05-14 RX ORDER — OXYCODONE HYDROCHLORIDE 10 MG/1
10 TABLET ORAL ONCE
Refills: 0 | Status: COMPLETED | OUTPATIENT
Start: 2025-05-14 | End: 2025-05-14

## 2025-05-14 RX ORDER — TRANEXAMIC ACID 10 MG/ML
1000 INJECTION, SOLUTION INTRAVENOUS ONCE
Status: COMPLETED | OUTPATIENT
Start: 2025-05-14 | End: 2025-05-14

## 2025-05-14 RX ORDER — MELOXICAM 15 MG/1
15 TABLET ORAL ONCE
Status: DISCONTINUED | OUTPATIENT
Start: 2025-05-14 | End: 2025-05-14

## 2025-05-14 RX ORDER — OXYCODONE HYDROCHLORIDE 5 MG/1
5 TABLET ORAL EVERY 4 HOURS PRN
Status: DISCONTINUED | OUTPATIENT
Start: 2025-05-14 | End: 2025-05-18 | Stop reason: HOSPADM

## 2025-05-14 RX ORDER — FENTANYL CITRATE 50 UG/ML
INJECTION, SOLUTION INTRAMUSCULAR; INTRAVENOUS
Status: COMPLETED
Start: 2025-05-14 | End: 2025-05-14

## 2025-05-14 RX ORDER — ALBUTEROL SULFATE 0.83 MG/ML
2.5 SOLUTION RESPIRATORY (INHALATION) EVERY 6 HOURS PRN
Status: DISCONTINUED | OUTPATIENT
Start: 2025-05-14 | End: 2025-05-18 | Stop reason: HOSPADM

## 2025-05-14 RX ORDER — ACETAMINOPHEN 500 MG
1000 TABLET ORAL ONCE
Status: COMPLETED | OUTPATIENT
Start: 2025-05-14 | End: 2025-05-14

## 2025-05-14 RX ORDER — MIDAZOLAM HYDROCHLORIDE 1 MG/ML
0.5 INJECTION, SOLUTION INTRAMUSCULAR; INTRAVENOUS
Status: DISCONTINUED | OUTPATIENT
Start: 2025-05-14 | End: 2025-05-14 | Stop reason: HOSPADM

## 2025-05-14 RX ORDER — LIDOCAINE HYDROCHLORIDE 10 MG/ML
0.5 INJECTION, SOLUTION EPIDURAL; INFILTRATION; INTRACAUDAL; PERINEURAL ONCE AS NEEDED
Status: COMPLETED | OUTPATIENT
Start: 2025-05-14 | End: 2025-05-14

## 2025-05-14 RX ORDER — SODIUM CHLORIDE 0.9 % (FLUSH) 0.9 %
10 SYRINGE (ML) INJECTION EVERY 12 HOURS SCHEDULED
Status: DISCONTINUED | OUTPATIENT
Start: 2025-05-14 | End: 2025-05-14 | Stop reason: HOSPADM

## 2025-05-14 RX ORDER — CEFAZOLIN SODIUM 1 G/3ML
INJECTION, POWDER, FOR SOLUTION INTRAMUSCULAR; INTRAVENOUS
Status: DISPENSED
Start: 2025-05-14 | End: 2025-05-15

## 2025-05-14 RX ORDER — NALOXONE HCL 0.4 MG/ML
0.1 VIAL (ML) INJECTION
Status: DISCONTINUED | OUTPATIENT
Start: 2025-05-14 | End: 2025-05-18 | Stop reason: HOSPADM

## 2025-05-14 RX ORDER — HYDRALAZINE HYDROCHLORIDE 50 MG/1
50 TABLET, FILM COATED ORAL 3 TIMES DAILY
Status: DISCONTINUED | OUTPATIENT
Start: 2025-05-14 | End: 2025-05-18 | Stop reason: HOSPADM

## 2025-05-14 RX ORDER — GABAPENTIN 100 MG/1
100 CAPSULE ORAL 3 TIMES DAILY
Status: DISCONTINUED | OUTPATIENT
Start: 2025-05-14 | End: 2025-05-18 | Stop reason: HOSPADM

## 2025-05-14 RX ORDER — SODIUM CHLORIDE 9 MG/ML
9 INJECTION, SOLUTION INTRAVENOUS ONCE
Status: COMPLETED | OUTPATIENT
Start: 2025-05-14 | End: 2025-05-14

## 2025-05-14 RX ORDER — OXYCODONE HYDROCHLORIDE 10 MG/1
TABLET ORAL
Status: COMPLETED
Start: 2025-05-14 | End: 2025-05-14

## 2025-05-14 RX ORDER — ONDANSETRON 2 MG/ML
4 INJECTION INTRAMUSCULAR; INTRAVENOUS EVERY 6 HOURS PRN
Status: DISCONTINUED | OUTPATIENT
Start: 2025-05-14 | End: 2025-05-18 | Stop reason: HOSPADM

## 2025-05-14 RX ORDER — MAGNESIUM HYDROXIDE 1200 MG/15ML
LIQUID ORAL AS NEEDED
Status: DISCONTINUED | OUTPATIENT
Start: 2025-05-14 | End: 2025-05-14 | Stop reason: HOSPADM

## 2025-05-14 RX ORDER — BUPIVACAINE HCL/0.9 % NACL/PF 0.125 %
PLASTIC BAG, INJECTION (ML) EPIDURAL AS NEEDED
Status: DISCONTINUED | OUTPATIENT
Start: 2025-05-14 | End: 2025-05-14 | Stop reason: SURG

## 2025-05-14 RX ORDER — AMOXICILLIN 250 MG
2 CAPSULE ORAL 2 TIMES DAILY
Status: DISCONTINUED | OUTPATIENT
Start: 2025-05-14 | End: 2025-05-18 | Stop reason: HOSPADM

## 2025-05-14 RX ORDER — NIFEDIPINE 30 MG/1
30 TABLET, EXTENDED RELEASE ORAL DAILY
Status: DISCONTINUED | OUTPATIENT
Start: 2025-05-15 | End: 2025-05-18 | Stop reason: HOSPADM

## 2025-05-14 RX ORDER — OXYCODONE HYDROCHLORIDE 10 MG/1
10 TABLET ORAL EVERY 4 HOURS PRN
Status: DISCONTINUED | OUTPATIENT
Start: 2025-05-14 | End: 2025-05-18 | Stop reason: HOSPADM

## 2025-05-14 RX ORDER — SODIUM CHLORIDE 0.9 % (FLUSH) 0.9 %
10 SYRINGE (ML) INJECTION AS NEEDED
Status: DISCONTINUED | OUTPATIENT
Start: 2025-05-14 | End: 2025-05-14 | Stop reason: HOSPADM

## 2025-05-14 RX ORDER — ONDANSETRON 2 MG/ML
INJECTION INTRAMUSCULAR; INTRAVENOUS AS NEEDED
Status: DISCONTINUED | OUTPATIENT
Start: 2025-05-14 | End: 2025-05-14 | Stop reason: SURG

## 2025-05-14 RX ORDER — ASPIRIN 81 MG/1
81 TABLET ORAL EVERY 12 HOURS SCHEDULED
Status: CANCELLED | OUTPATIENT
Start: 2025-05-15

## 2025-05-14 RX ORDER — SODIUM CHLORIDE, SODIUM LACTATE, POTASSIUM CHLORIDE, CALCIUM CHLORIDE 600; 310; 30; 20 MG/100ML; MG/100ML; MG/100ML; MG/100ML
9 INJECTION, SOLUTION INTRAVENOUS CONTINUOUS
Status: ACTIVE | OUTPATIENT
Start: 2025-05-15 | End: 2025-05-15

## 2025-05-14 RX ORDER — CARVEDILOL 6.25 MG/1
6.25 TABLET ORAL 2 TIMES DAILY WITH MEALS
Status: DISCONTINUED | OUTPATIENT
Start: 2025-05-14 | End: 2025-05-18 | Stop reason: HOSPADM

## 2025-05-14 RX ORDER — FAMOTIDINE 20 MG/1
20 TABLET, FILM COATED ORAL ONCE
Status: COMPLETED | OUTPATIENT
Start: 2025-05-14 | End: 2025-05-14

## 2025-05-14 RX ORDER — BISACODYL 10 MG
10 SUPPOSITORY, RECTAL RECTAL DAILY PRN
Status: DISCONTINUED | OUTPATIENT
Start: 2025-05-14 | End: 2025-05-18 | Stop reason: HOSPADM

## 2025-05-14 RX ORDER — BISACODYL 5 MG/1
5 TABLET, DELAYED RELEASE ORAL DAILY PRN
Status: DISCONTINUED | OUTPATIENT
Start: 2025-05-14 | End: 2025-05-18 | Stop reason: HOSPADM

## 2025-05-14 RX ORDER — LABETALOL HYDROCHLORIDE 5 MG/ML
10 INJECTION, SOLUTION INTRAVENOUS EVERY 4 HOURS PRN
Status: DISCONTINUED | OUTPATIENT
Start: 2025-05-14 | End: 2025-05-18 | Stop reason: HOSPADM

## 2025-05-14 RX ORDER — SODIUM CHLORIDE 9 MG/ML
INJECTION, SOLUTION INTRAVENOUS
Status: DISPENSED
Start: 2025-05-14 | End: 2025-05-15

## 2025-05-14 RX ORDER — ACETAMINOPHEN 500 MG
1000 TABLET ORAL EVERY 8 HOURS
Status: DISCONTINUED | OUTPATIENT
Start: 2025-05-14 | End: 2025-05-18 | Stop reason: HOSPADM

## 2025-05-14 RX ORDER — LISINOPRIL 40 MG/1
40 TABLET ORAL EVERY MORNING
Status: DISCONTINUED | OUTPATIENT
Start: 2025-05-15 | End: 2025-05-18 | Stop reason: HOSPADM

## 2025-05-14 RX ORDER — VANCOMYCIN HYDROCHLORIDE 1 G/20ML
INJECTION, POWDER, LYOPHILIZED, FOR SOLUTION INTRAVENOUS AS NEEDED
Status: DISCONTINUED | OUTPATIENT
Start: 2025-05-14 | End: 2025-05-14 | Stop reason: HOSPADM

## 2025-05-14 RX ORDER — BUMETANIDE 1 MG/1
1 TABLET ORAL DAILY
Status: DISCONTINUED | OUTPATIENT
Start: 2025-05-15 | End: 2025-05-18 | Stop reason: HOSPADM

## 2025-05-14 RX ORDER — SODIUM CHLORIDE, SODIUM LACTATE, POTASSIUM CHLORIDE, CALCIUM CHLORIDE 600; 310; 30; 20 MG/100ML; MG/100ML; MG/100ML; MG/100ML
100 INJECTION, SOLUTION INTRAVENOUS CONTINUOUS
Status: ACTIVE | OUTPATIENT
Start: 2025-05-14 | End: 2025-05-15

## 2025-05-14 RX ORDER — ROCURONIUM BROMIDE 10 MG/ML
INJECTION, SOLUTION INTRAVENOUS AS NEEDED
Status: DISCONTINUED | OUTPATIENT
Start: 2025-05-14 | End: 2025-05-14 | Stop reason: SURG

## 2025-05-14 RX ORDER — ONDANSETRON 2 MG/ML
4 INJECTION INTRAMUSCULAR; INTRAVENOUS ONCE AS NEEDED
Status: DISCONTINUED | OUTPATIENT
Start: 2025-05-14 | End: 2025-05-14 | Stop reason: HOSPADM

## 2025-05-14 RX ADMIN — PROPOFOL 100 MG: 10 INJECTION, EMULSION INTRAVENOUS at 10:24

## 2025-05-14 RX ADMIN — SODIUM CHLORIDE 2000 MG: 900 INJECTION INTRAVENOUS at 10:15

## 2025-05-14 RX ADMIN — ACETAMINOPHEN 1000 MG: 500 TABLET ORAL at 21:38

## 2025-05-14 RX ADMIN — OXYCODONE HYDROCHLORIDE 10 MG: 10 TABLET ORAL at 14:03

## 2025-05-14 RX ADMIN — SODIUM CHLORIDE, POTASSIUM CHLORIDE, SODIUM LACTATE AND CALCIUM CHLORIDE: 600; 310; 30; 20 INJECTION, SOLUTION INTRAVENOUS at 10:07

## 2025-05-14 RX ADMIN — ACETAMINOPHEN 1000 MG: 500 TABLET ORAL at 08:38

## 2025-05-14 RX ADMIN — CEFAZOLIN 1000 MG: 1 INJECTION, POWDER, FOR SOLUTION INTRAMUSCULAR; INTRAVENOUS at 12:27

## 2025-05-14 RX ADMIN — FENTANYL CITRATE 50 MCG: 50 INJECTION, SOLUTION INTRAMUSCULAR; INTRAVENOUS at 12:51

## 2025-05-14 RX ADMIN — DEXAMETHASONE SODIUM PHOSPHATE 4 MG: 4 INJECTION INTRA-ARTICULAR; INTRALESIONAL; INTRAMUSCULAR; INTRAVENOUS; SOFT TISSUE at 10:29

## 2025-05-14 RX ADMIN — Medication 100 MCG: at 10:24

## 2025-05-14 RX ADMIN — HYDROMORPHONE HYDROCHLORIDE 0.5 MG: 1 INJECTION, SOLUTION INTRAMUSCULAR; INTRAVENOUS; SUBCUTANEOUS at 13:25

## 2025-05-14 RX ADMIN — LIDOCAINE HYDROCHLORIDE 50 MG: 10 INJECTION, SOLUTION EPIDURAL; INFILTRATION; INTRACAUDAL; PERINEURAL at 10:11

## 2025-05-14 RX ADMIN — Medication 100 MCG: at 10:49

## 2025-05-14 RX ADMIN — TRANEXAMIC ACID 1000 MG: 10 INJECTION, SOLUTION INTRAVENOUS at 11:20

## 2025-05-14 RX ADMIN — SUGAMMADEX 200 MG: 100 INJECTION, SOLUTION INTRAVENOUS at 12:13

## 2025-05-14 RX ADMIN — ONDANSETRON 4 MG: 2 INJECTION INTRAMUSCULAR; INTRAVENOUS at 12:13

## 2025-05-14 RX ADMIN — SODIUM CHLORIDE 9 ML/HR: 900 INJECTION INTRAVENOUS at 09:23

## 2025-05-14 RX ADMIN — IPRATROPIUM BROMIDE 0.5 MG: 0.5 SOLUTION RESPIRATORY (INHALATION) at 20:25

## 2025-05-14 RX ADMIN — GABAPENTIN 100 MG: 100 CAPSULE ORAL at 21:39

## 2025-05-14 RX ADMIN — PROPOFOL 50 MG: 10 INJECTION, EMULSION INTRAVENOUS at 10:11

## 2025-05-14 RX ADMIN — SODIUM CHLORIDE, POTASSIUM CHLORIDE, SODIUM LACTATE AND CALCIUM CHLORIDE: 600; 310; 30; 20 INJECTION, SOLUTION INTRAVENOUS at 12:18

## 2025-05-14 RX ADMIN — Medication 200 MCG: at 10:40

## 2025-05-14 RX ADMIN — SODIUM CHLORIDE, POTASSIUM CHLORIDE, SODIUM LACTATE AND CALCIUM CHLORIDE 100 ML/HR: 600; 310; 30; 20 INJECTION, SOLUTION INTRAVENOUS at 18:51

## 2025-05-14 RX ADMIN — TRANEXAMIC ACID 1000 MG: 10 INJECTION, SOLUTION INTRAVENOUS at 10:30

## 2025-05-14 RX ADMIN — SODIUM CHLORIDE 2 G: 900 INJECTION INTRAVENOUS at 21:37

## 2025-05-14 RX ADMIN — LIDOCAINE HYDROCHLORIDE 0.5 ML: 10 INJECTION, SOLUTION EPIDURAL; INFILTRATION; INTRACAUDAL; PERINEURAL at 08:38

## 2025-05-14 RX ADMIN — ONDANSETRON 4 MG: 2 INJECTION INTRAMUSCULAR; INTRAVENOUS at 18:51

## 2025-05-14 RX ADMIN — PHENYLEPHRINE HYDROCHLORIDE 0.5 MCG/KG/MIN: 10 INJECTION INTRAVENOUS at 10:49

## 2025-05-14 RX ADMIN — Medication 200 MCG: at 10:34

## 2025-05-14 RX ADMIN — FENTANYL CITRATE 50 MCG: 50 INJECTION, SOLUTION INTRAMUSCULAR; INTRAVENOUS at 12:35

## 2025-05-14 RX ADMIN — FAMOTIDINE 20 MG: 20 TABLET, FILM COATED ORAL at 08:38

## 2025-05-14 RX ADMIN — Medication 0.5 MG: at 13:25

## 2025-05-14 RX ADMIN — ROCURONIUM BROMIDE 50 MG: 10 INJECTION INTRAVENOUS at 10:24

## 2025-05-14 NOTE — OP NOTE
TOTAL HIP ARTHROPLASTY ANTERIOR  Procedure Report    Patient Name:  Khushbu Bledsoe  YOB: 1934    Date of Surgery:  5/14/2025     Indications:    89 y.o. female who was admitted to [facility] on a progressive management of nonoperative treatment of hip osteoarthritis. Unfortunately, patient progressed with significant pain and difficulty with ambulation and daily function.    The patient was indicated for a total hip arthroplasty. Likely risks and benefits of the procedure including but not limited to infection, DVT, pulmonary embolism, leg length discrepancy, recurrent dislocation, possibility of injury to nerves and vessels, and periprosthetic fractures have been discussed with the patient. Despite the risks involved, the patient elected to proceed with an informed consent obtained.     Patient Identification:  Patient was seen in the prep, consent was reviewed, operative procedure was identified, surgical site and thigh marked.      Pre-op Diagnosis:   Arthritis of right hip [4536444]       Post-Op Diagnosis Codes:     * Arthritis of right hip [M16.11]    Procedure/CPT® Codes:  MD ARTHRP ACETBLR/PROX FEM PROSTC AGRFT/ALGRFT [99726]    Procedure(s):  TOTAL HIP ARTHROPLASTY ANTERIOR    Staff:  Surgeon(s):  Julian Mcgee MD    Anesthesia: Spinal    Estimated Blood Loss: 200ml    Implants:    Implant Name Type Inv. Item Serial No.  Lot No. LRB No. Used Action   CMT BONE SIMPLEX/P TMYCIN FDOS 10PK - [id number] Implant CMT BONE SIMPLEX/P TMYCIN FDOS 10PK  COREY [email or web address] [id number] Right 1 Implanted   CMT BONE SIMPLEX/P TMYCIN FDOS 10PK - [id number] Implant CMT BONE SIMPLEX/P TMYCIN FDOS 10PK  COREY [email or web address] [id number] Right 1 Implanted   DEV CONTRL TISS STRATAFIX SYMM PDS PLUS SHEREEN CT-1 45CM - [id number] Implant DEV CONTRL TISS STRATAFIX SYMM PDS PLUS SHEREEN CT-1 45CM  [employer]  [employer] J [id number] Right 1 Implanted   SUT NONABS MAXBRAID/PE NMBR2 HC5 38IN WHT [id number] - [id number]  Implant SUT NONABS MAXBRAID/PE NMBR2 HC5 38IN WHT [id number]  [employer]  Right 1 Implanted   SUT NONABS MAXBRAID/PE NMBR2 HC5 38IN WHT [id number] - [id number] Implant SUT NONABS MAXBRAID/PE NMBR2 HC5 38IN T [id number]  [employer]  Right 1 Implanted   SHLL TRIDENT2 TRITANIUM C/HL 46MM - [id number] Implant SHLL TRIDENT2 TRITANIUM C/HL 46MM  COREY CAMDEN [id number] Right 1 Implanted   SCRW HEX LP TRIDENT2 6.5X40MM - [id number] Implant SCRW HEX LP TRIDENT2 6.5X40MM  COREY CAMDEN [id number] Right 1 Implanted   PLUG BONE IM FEM/HIP [id number] 8MM - [id number] Implant PLUG BONE IM FEM/HIP [id number] 8MM  COREY CAMDEN  Right 1 Implanted   STEM FEM/HIP EXETER V40 CMT OFFST 37.5MM SZ1 125MM - [id number] Implant STEM FEM/HIP EXETER V40 CMT OFFST 37.5MM SZ1 125MM  COREY CAMDEN [id number] Right 1 Implanted   INSRT HIP TRIDENT X3 0DEG SZC 32MM STRL - [id number] Implant INSRT HIP TRIDENT X3 0DEG SZC 32MM STRL  COREY CAMDEN [id number] Right 1 Implanted   HD FEM BIOLOXDELTA V40 32 MIN4 - [id number] Implant HD FEM BIOLOXDELTA V40 32 MIN4  COREY CAMDEN [id number] Right 1 Implanted       Specimen:          None      Findings: see dictation    Complications: none    Description of Procedure:   The patient was transferred to [facility] operating room and preoperative antibiotics given in form of Kefzol 2gm IV prior to skin incision as well as 1 g of tranexamic acid. Patient received general  anesthesia and was transferred to the [place] table. All bony prominences were padded adequately. The operative hip was then prepped and draped in the usual sterile fashion. Multiple timeouts were done identifying the correct patient , surgical site and planned procedure.    A skin incision was made overlying the anterior lateral aspect of the hip for about 10 cm just below and lateral to the anterior superior iliac spine. Skin and subcutaneous tissue and fascia was incised in line with the skin incision overlying the tensor fascia  yola muscle. The tensor muscle was then retracted laterally and the interval between sartorius and rectus femoris medially and the tensor fascia muscle were developed. The lateral femoral circumflex vessels were identified and ligated without difficulty. The deep fascia was incised and the capsule of the hip joint was identified. We then placed a soft tissue protecting device deep to the rectus and the tensor. Retractors were then placed extracapsular and the capsule was then incised in a T-shaped manner. The anterior posterior capsules were then tied with maxbraid suture . Following this, retractors were placed intracapsularly. We did identify the obvious signs of severe osteoarthritis upon incising the capsule. We then made appropriate releases done on the inferior medial neck and along the saddle of the femoral neck. Femoral neck remaining was identified and osteotomy was done according to the preoperative templating with an oscillating saw. The femoral head and cut neck were extracted using a corkscrew without difficulty. The femoral head did have major signs of articular cartilage loss superior wear.    The acetabular cavity was exposed by placing retractors and taking care to protect the anterior vascular structures. The labrum was excised. The pulvinar was excised from the cotyloid fossa. Acetabular cavity was then progressively reamed maintaining the correct inclination and version under image intensifier control. Adequate medialization was obtained. Adequate fit was found to be obtained with the reamer size of 46. The brandyn t2 cup size 46 was then impacted into position. This found to seat satisfactorily with adequate inclination and anteversion. It was stable but we did insert one 6.5/40 mm lag screw. This was checked under fluoroscopy and found to be in good position. Following this the cup was cleaned and then a 32 neutral     impacted. Following this the periarticular tissues were then infiltrated  with local anesthetic.    Attention was then directed to the proximal femur. The proximal femur was delivered using a trochanteric hook placed just distal to the vastus tubercle and proximal to the gluteus cesar tendon. The leg was then externally rotated and gross traction released. Hyperextension and adduction was done using the [place] table. Mechanical lift on the table was utilized to support the femur. Appropriate capsular releases were made to expose the medial slope of the greater trochanter. Proximal femur was delivered and the femoral canal was then entered by removing lateral femoral neck with a box chisel by serial broaching. Adequate fit was found to be obtained with the size 37.5 N1 125 broach. We then trialed a  -2.5  and neck was reduced. Fluoroscopic imaging was used to assess leg length and offset was found to be symmetric. The trials were then removed. A #37.5 N1 125 exeter cemented was implanted in appropriate anteversion. It sat very similar to our broach trial. We chose the  -2.5  neck ceramic. This was then reduced again. Fluoroscopic images both lateral and AP of the femur showed the stem to be in good position. AP pelvis showed symmetric leg length and offset. The hip was then taken through a range of motion and found to be stable. There were no acute fractures. The wound was then thoroughly irrigated with saline. We did use of more of the injection cocktail. Betadine irrigation was used followed by 3L of saline irrigation. Soft tissue hemostasis was secured and topical TXA was used. Sponge, needle, and instrument counts were correct. Maxibraid sutures directly anterior and posterior capsular flaps were tied to each other and then closed the fascial layer with a running #2 STRATAFIX followed by 2-0 Vicryl and then Monocryl for the skin and skin affix. Once the skin affix had dried optifoam AG dressing placed over the top. The patient was then transferred to the recovery room in stable  condition. The patient tolerated the procedure well. There were no medications. The patient had adequate distal pulses and good capillary refill.    The patient will be mobilized by physical therapy. The patient received antibiotic prophylaxis postoperatively for 2 more doses given the first 24 hours. The patient was started on DVT prophylaxis with 81 mg aspirin twice daily starting postoperative day #1.    I discussed the satisfactory performance of the procedure with the patient's family and discussed the postoperative management.      Assistant Participation:  Surgeon(s):  Julian Mcgee MD    Assistant: Dorian Hansen PA-C assisted with proper preoperative positioning, preoperative templating, determingin availability of proper implants, prepping and draping of patient, manipulation placement of instruments, protection of ligaments and vital soft tissue structures, assistance in maintaining hemostasis and assistance with closure of the wound. Their skills and knowledge of the steps of operation and the desired outcome of each surgical step was crucial, allowing for efficient choreography of surgical procedure, and closure of the wound which lead to reduced surgical time, less blood loss, and less risk of complications for the patient.       Julian Mcgee MD     Date: 5/14/2025  Time: 11:59 EDT

## 2025-05-14 NOTE — H&P
Patient Name: Khushbu Bledsoe  MRN: [id number]  : 3/17/1934  DOS: 2025    Attending: Julian Mcgee MD    Primary Care Provider: Ana Ross MD      Chief complaint:  Right hip pain    Subjective   Patient is a pleasant 89 y.o. female presented for scheduled surgery by Dr. Mcgee. She underwent right total hip arthroplasty under spinal anesthesia.  She tolerated surgery well and was admitted for further medical management.  Her hip has been painful for several years.  She uses a walker with ambulation.  She denies recent falls.    When seen in PACU she is doing well.  Her pain is well-controlled.  She denies nausea, shortness of breath or chest pain.  No history of DVT or PE.    She has a history of hypertension, congestive heart failure and atrial fibrillation.  She is on chronic Eliquis.  She is followed by Dr. Mesa and had preop cardiac clearance.    Allergies:  No Known Allergies    Meds:  Medications Prior to Admission   Medication Sig Dispense Refill Last Dose/Taking    albuterol sulfate  (90 Base) MCG/ACT inhaler Inhale 2 puffs Every 4 (Four) Hours As Needed for Wheezing. 18 g 0 Past Month    bumetanide (BUMEX) 1 MG tablet Take 1 tablet by mouth Daily.   2025 at  5:30 AM    carvedilol (COREG) 6.25 MG tablet Take 1 tablet by mouth 2 (Two) Times a Day With Meals. 180 tablet 1 2025 at  5:30 AM    Cholecalciferol 125 MCG (5000 UT) tablet Take 1 tablet by mouth Daily.   Past Week    gabapentin (NEURONTIN) 100 MG capsule Take 1 capsule by mouth 3 (Three) Times a Day.   2025 Morning    hydrALAZINE (APRESOLINE) 50 MG tablet Take 1 tablet by mouth 3 (Three) Times a Day. 270 tablet 1 2025 at  5:30 AM    ipratropium (ATROVENT HFA) 17 MCG/ACT inhaler Inhale 2 puffs 4 (Four) Times a Day. 12.9 g 11 Past Week    lisinopril (PRINIVIL,ZESTRIL) 40 MG tablet Take 1 tablet by mouth Every Morning. 90 tablet 1 2025 Morning    Multiple Vitamins-Minerals (PRESERVISION AREDS 2  PO) Take 1 capsule by mouth 2 (Two) Times a Day.   Past Week    multivitamin (Multi-Vitamin Daily) tablet tablet Take 1 tablet by mouth Daily.   Past Week    NIFEdipine XL (PROCARDIA XL) 30 MG 24 hr tablet Take 1 tablet by mouth Daily. 90 tablet 1 5/14/2025 at  5:30 AM    Omega-3 Fatty Acids (fish oil) 1200 MG capsule capsule Take 1 capsule by mouth 2 (Two) Times a Day With Meals.   Past Week    pantoprazole (PROTONIX) 40 MG EC tablet Take 1 tablet by mouth Every Morning Before Breakfast. 90 tablet 1 5/13/2025 Morning    spironolactone (ALDACTONE) 12.5 MG tablet half tablet Take 1 half tablet by mouth Daily.   5/14/2025 at  5:30 AM    acetaminophen (TYLENOL) 500 MG tablet Take 2 tablets by mouth every 8 hours 84 tablet 0     apixaban (Eliquis) 2.5 MG tablet tablet Take 1 tablet by mouth Every 12 (Twelve) Hours for 7 days. Then resume home dose. 14 tablet 0     cefadroxil (DURICEF) 500 MG capsule Take 1 capsule by mouth every 12 hours for 3 days 6 capsule 0     Cholecalciferol (Vitamin D3) 50 MCG (2000 UT) tablet Take 1 tablet by mouth Daily. 60 tablet 0     docusate sodium (Colace) 100 MG capsule Take 1-2 capsules by mouth Daily As Needed. 60 capsule 2     Eliquis 2.5 MG tablet tablet Take 1 tablet by mouth Every 12 (Twelve) Hours. (Patient taking differently: Take 1 tablet by mouth Every 12 (Twelve) Hours. Patient instructed to hold 72hours prior to hip surgery.) 180 tablet 3 5/10/2025    ondansetron (ZOFRAN) 4 MG tablet Take 1 tablet by mouth Every 6 (Six) Hours As Needed. 28 tablet 0     oxyCODONE (ROXICODONE) 5 MG immediate release tablet Take 1 tablet by mouth Every 4 (Four) to 6 (Six) Hours As Needed. 60 tablet 0     traMADol (ULTRAM) 50 MG tablet Take 1-2 tablets by mouth Every 6 (Six) Hours. 64 tablet 0     Tranexamic Acid 650 MG tablet Take 3 tablets by mouth once daily BEGINNING the day after surgery 12 tablet 0          History:   Past Medical History:   Diagnosis Date    AF (paroxysmal atrial  "fibrillation) 09/10/2024    Arthritis 2010    Cervical disc disorder 2010    CHF (congestive heart failure)     CKD (chronic kidney disease) stage 4, GFR 15-29 ml/min 02/06/2025    Congenital heart disease     Coronary artery disease     History of shingles 05/2024    Hypertension     Low back pain 2010    Lumbosacral disc disease 2010    Thoracic disc disorder 2010     Past Surgical History:   Procedure Laterality Date    BACK SURGERY  2010    CATARACT EXTRACTION Bilateral     COLONOSCOPY      JOINT REPLACEMENT  5/14/2025    RETINAL DETACHMENT REPAIR Left     SPINAL FUSION  2010     Family History   Problem Relation Age of Onset    Heart attack Brother     Cancer Son     Arthritis Son     Heart disease Son      Social History     Tobacco Use    Smoking status: Never    Smokeless tobacco: Never   Vaping Use    Vaping status: Never Used   Substance Use Topics    Alcohol use: Not Currently    Drug use: Never   She lives with her son.  She has 2 children.  She is a retired .    Review of Systems  All systems were reviewed and negative except for:  Respiratory: positive for  shortness of air  Gastrointestinal: positive for  stool incontinence    Vital Signs  /58 (Patient Position: Lying)   Pulse 60   Temp 97.4 °F (36.3 °C) (Axillary)   Resp 10   Ht 152.4 cm (60\")   Wt 60.8 kg (134 lb)   SpO2 98%   BMI 26.17 kg/m²     Physical Exam:    General Appearance:    Alert, cooperative, in no acute distress   Head:    Normocephalic, without obvious abnormality, atraumatic   Eyes:            Lids and lashes normal, conjunctivae and sclerae normal, no   icterus, no pallor, corneas clear,    Ears:    Ears appear intact with no abnormalities noted   Throat:   No oral lesions, no thrush, oral mucosa moist   Neck:   No adenopathy, supple, trachea midline, no thyromegaly    Lungs:     Clear to auscultation,respirations regular, even and unlabored    Heart:    Regular rhythm and normal rate, normal S1 and S2 "   Abdomen:     Normal bowel sounds, no masses, no organomegaly, soft nontender, nondistended, no guarding, no rebound  tenderness   Genitalia:    Deferred   Extremities: Right hip dressing CDI   Pulses:   Pulses palpable and equal bilaterally   Skin:   No bleeding, bruising or rash   Neurologic:   Cranial nerves 2 - 12 grossly intact. Flexion and dorsiflexion intact bilateral feet.        I reviewed the patient's new clinical results.       Lab Results   Component Value Date    HGBA1C 6.50 (H) 05/07/2025      Latest Reference Range & Units 05/07/25 13:29   Sodium 136 - 145 mmol/L 136   Potassium 3.5 - 5.2 mmol/L 4.1   Chloride 98 - 107 mmol/L 99   CO2 22.0 - 29.0 mmol/L 23.0   Anion Gap 5.0 - 15.0 mmol/L 14.0   BUN 8 - 23 mg/dL 65 (H)   Creatinine 0.57 - 1.00 mg/dL 1.84 (H)   BUN/Creatinine Ratio 7.0 - 25.0  35.3 (H)   eGFR >60.0 mL/min/1.73 25.6 (L)   Glucose 65 - 99 mg/dL 198 (H)   Calcium 8.2 - 9.6 mg/dL 9.5   Alkaline Phosphatase 39 - 117 U/L 179 (H)   Total Protein 6.0 - 8.5 g/dL 7.2   Albumin 3.5 - 5.2 g/dL 4.3   Globulin gm/dL 2.9   A/G Ratio g/dL 1.5   AST (SGOT) 1 - 32 U/L 22   ALT (SGPT) 1 - 33 U/L 21   Total Bilirubin 0.0 - 1.2 mg/dL 0.2   Fructosamine 0 - 285 umol/L 327 (H)   Hemoglobin A1C 4.80 - 5.60 % 6.50 (H)   C-Reactive Protein 0.00 - 0.50 mg/dL 0.85 (H)   25 Hydroxy, Vitamin D 30.0 - 100.0 ng/ml 77.8   Protime 12.2 - 15.3 Seconds 15.9 (H)   INR 0.89 - 1.12  1.19 (H)   PTT 22.0 - 39.0 seconds 47.2 (H)   WBC 3.40 - 10.80 10*3/mm3 8.49   RBC 3.77 - 5.28 10*6/mm3 4.05   Hemoglobin 12.0 - 15.9 g/dL 10.9 (L)   Hematocrit 34.0 - 46.6 % 35.3   Platelets 140 - 450 10*3/mm3 332   RDW 12.3 - 15.4 % 14.6   MCV 79.0 - 97.0 fL 87.2   MCH 26.6 - 33.0 pg 26.9   MCHC 31.5 - 35.7 g/dL 30.9 (L)   MPV 6.0 - 12.0 fL 8.8   (H): Data is abnormally high  (L): Data is abnormally low    Assessment and Plan:     Status post total replacement of right hip    Arthritis of right hip    AF (paroxysmal atrial fibrillation)     Essential hypertension    CKD (chronic kidney disease) stage 4, GFR 15-29 ml/min    CHF (congestive heart failure)    Elevated hemoglobin A1c      Plan  1. PT/OT- WBAT RLE  2. Pain control-prns   3. IS-encourage  4. DVT proph- Mechs/Eliquis at prophylactic dose for 1 week, then resume home dose  5. Bowel regimen  6. Resume home medications as appropriate  7. Monitor post-op labs  8. DC planning for rehab    HTN, CHF, afib  - Continue home hydralazine, lisinopril, nifedipine and Coreg  -Hold spironolactone and Bumex for now  - Monitor BP   - Holding parameters for BP meds  - Labetalol PRN for SBP>170    CKD  - Avoid nephrotoxic agents    Elevated hemoglobin A1c: In prediabetic range  - Explained to patient implication of A1C elevation, need to modify diet and increase activity, and f/u with PCP.      Monica Banks, APRN  05/14/25  14:18 EDT

## 2025-05-14 NOTE — ANESTHESIA PROCEDURE NOTES
Airway  Reason: elective    Date/Time: 5/14/2025 10:26 AM  Airway not difficult    General Information and Staff    Patient location during procedure: OR  CRNA/CAA: Nelson Tracy CRNA    Indications and Patient Condition  Indications for airway management: airway protection    Preoxygenated: yes  MILS not maintained throughout    Mask difficulty assessment: 1 - vent by mask    Final Airway Details    Final airway type: endotracheal airway      Successful airway: ETT  Cuffed: yes   Successful intubation technique: direct laryngoscopy  Endotracheal tube insertion site: oral  Blade: Schroeder  Blade size: 2  ETT size (mm): 7.0  Cormack-Lehane Classification: grade I - full view of glottis  Placement verified by: chest auscultation and capnometry   Cuff volume (mL): 5  Measured from: lips  ETT/EBT  to lips (cm): 20  Number of attempts at approach: 1  Assessment: lips, teeth, and gum same as pre-op and atraumatic intubation    Additional Comments  Negative epigastric sounds, Breath sound equal bilaterally with symmetric chest rise and fall

## 2025-05-14 NOTE — THERAPY EVALUATION
Patient Name: Khushbu Bledsoe  : 3/17/1934    MRN: [id number]                              Today's Date: 2025       Admit Date: 2025    Visit Dx: No diagnosis found.  Patient Active Problem List   Diagnosis    AF (paroxysmal atrial fibrillation)    Acute on chronic diastolic CHF (congestive heart failure)    Essential hypertension    Nonrheumatic mitral valve regurgitation    Lumbar radiculopathy    Age-related physical debility    Loss of balance    Lumbar canal stenosis    Difficulty breathing    CKD (chronic kidney disease) stage 4, GFR 15-29 ml/min    Arthritis of right hip    Hyperglycemia    Status post total replacement of right hip    CHF (congestive heart failure)    Elevated hemoglobin A1c     Past Medical History:   Diagnosis Date    AF (paroxysmal atrial fibrillation) 09/10/2024    Arthritis 2010    Cervical disc disorder 2010    CHF (congestive heart failure)     CKD (chronic kidney disease) stage 4, GFR 15-29 ml/min 2025    Congenital heart disease     Coronary artery disease     History of shingles 2024    Hypertension     Low back pain 2010    Lumbosacral disc disease 2010    Thoracic disc disorder 2010     Past Surgical History:   Procedure Laterality Date    BACK SURGERY  2010    CATARACT EXTRACTION Bilateral     COLONOSCOPY      JOINT REPLACEMENT  2025    RETINAL DETACHMENT REPAIR Left     SPINAL FUSION        General Information       Row Name 25 2488          Physical Therapy Time and Intention    Document Type evaluation  -SC     Mode of Treatment physical therapy  -SC       Row Name 25 1560          General Information    Patient Profile Reviewed yes  -SC     Prior Level of Function independent:;gait;transfer;all household mobility;bed mobility  ambulated with a walker  -SC     Existing Precautions/Restrictions fall;hip;hip, anterior;right  -SC     Barriers to Rehab previous functional deficit  -SC       Row Name 25 3446          Living  Environment    Current Living Arrangements home  -SC     People in Home child(jf), adult  son  -SC       Row Name 05/14/25 1757          Home Main Entrance    Number of Stairs, Main Entrance one  -SC       Row Name 05/14/25 1757          Stairs Within Home, Primary    Number of Stairs, Within Home, Primary none  -SC       Row Name 05/14/25 1757          Cognition    Orientation Status (Cognition) oriented x 3  -SC       Row Name 05/14/25 1757          Safety Issues/Impairments Affecting Functional Mobility    Impairments Affecting Function (Mobility) pain;strength;range of motion (ROM);motor control  -SC     Comment, Safety Issues/Impairments (Mobility) alert, following commands, c/o dizziness  -SC               User Key  (r) = Recorded By, (t) = Taken By, (c) = Cosigned By      Initials Name Provider Type    SC Yanni Clark PT Physical Therapist                   Mobility       Row Name 05/14/25 1810          Bed Mobility    Bed Mobility supine-sit;scooting/bridging  -SC     Scooting/Bridging [place] (Bed Mobility) 2 person assist;moderate assist (50% patient effort)  -SC     Supine-Sit [place] (Bed Mobility) moderate assist (50% patient effort);1 person assist  -SC     Comment, (Bed Mobility) recieved sitting EOB, had been assisted up to EOB by nursing  -[facility] Name 05/14/25 1810          Transfers    Comment, (Transfers) cues for hand placement, Demonstrated slow transfer  -[facility] 05/14/25 1810          Bed-Chair Transfer    Bed-Chair [place] (Transfers) 1 person assist;1 person to manage equipment  -SC     Assistive Device (Bed-Chair Transfers) walker, front-wheeled  -[facility] Name 05/14/25 1810          Sit-Stand Transfer    Sit-Stand [place] (Transfers) 1 person assist;minimum assist (75% patient effort);verbal cues  -SC     Assistive Device (Sit-Stand Transfers) walker, front-wheeled  -[facility] Name 05/14/25 1810          Gait/Stairs (Locomotion)     [place] Level (Gait) independent;1 person assist;minimum assist (75% patient effort)  -SC     Assistive Device (Gait) walker, front-wheeled  -SC     Patient was able to Ambulate no, other medical factors prevent ambulation  -SC     Reason Patient was unable to Ambulate Dizziness  -SC     Distance in Feet (Gait) 4  -SC     Deviations/Abnormal Patterns (Gait) right sided deviations;gait speed decreased;stride length decreased;weight shifting decreased;antalgic  -SC     Bilateral Gait Deviations forward flexed posture  -SC     Comment, (Gait/Stairs) Patient required assistance to control walker . Cues for staying close to walker. Further ambulation deferred due to dizziness  -[facility] Name 05/14/25 1810          Mobility    Extremity Weight-bearing Status right lower extremity  -SC     Right Lower Extremity (Weight-bearing Status) weight-bearing as tolerated (WBAT)  -SC               User Key  (r) = Recorded By, (t) = Taken By, (c) = Cosigned By      Initials Name Provider Type    SC Yanni Clark, PT Physical Therapist                   Obj/Interventions       Row Name 05/14/25 1814          Range of Motion Comprehensive    Comment, General Range of Motion R hip limited by pain  -SC       Row Name 05/14/25 1814          Strength Comprehensive (MMT)    Comment, General Manual Muscle Testing (MMT) Assessment R LE : quads 4/5, tib ant 4/5  -SC       Row Name 05/14/25 1814          Motor Skills    Therapeutic Exercise hip;knee;ankle  -SC       Row Name 05/14/25 1814          Hip (Therapeutic Exercise)    Hip (Therapeutic Exercise) AROM (active range of motion)  -SC     Hip AROM (Therapeutic Exercise) flexion;extension;aBduction;aDduction;supine;10 repetitions  -SC       Row Name 05/14/25 1814          Knee (Therapeutic Exercise)    Knee (Therapeutic Exercise) AROM (active range of motion);isometric exercises  -SC     Knee AROM (Therapeutic Exercise) LAQ (long arc quad);heel slides;10 repetitions  -SC     Knee  Isometrics (Therapeutic Exercise) bilateral;gluteal sets;quad sets  -SC       Row Name 05/14/25 1814          Ankle (Therapeutic Exercise)    Ankle (Therapeutic Exercise) AROM (active range of motion)  -SC     Ankle AROM (Therapeutic Exercise) supine;10 repetitions  -SC               User Key  (r) = Recorded By, (t) = Taken By, (c) = Cosigned By      Initials Name Provider Type    SC Yanni Clark, PT Physical Therapist                   Goals/Plan       Row Name 05/14/25 1817          Bed Mobility Goal 1 (PT)    Activity/Assistive Device (Bed Mobility Goal 1, PT) sit to supine/supine to sit  -SC     [place] Level/Cues Needed (Bed Mobility Goal 1, PT) modified independence  -SC     Time Frame (Bed Mobility Goal 1, PT) long term goal (LTG);3 days  -[facility] Name 05/14/25 1817          Transfer Goal 1 (PT)    Activity/Assistive Device (Transfer Goal 1, PT) sit-to-stand/stand-to-sit  -SC     [place] Level/Cues Needed (Transfer Goal 1, PT) modified independence  -SC     Time Frame (Transfer Goal 1, PT) long term goal (LTG);3 days  -[facility] Name 05/14/25 1817          Gait Training Goal 1 (PT)    Activity/Assistive Device (Gait Training Goal 1, PT) gait (walking locomotion)  -SC     [place] Level (Gait Training Goal 1, PT) modified independence  -SC     Distance (Gait Training Goal 1, PT) 150  -SC     Time Frame (Gait Training Goal 1, PT) long term goal (LTG);3 days  -[facility] Name 05/14/25 1817          Therapy Assessment/Plan (PT)    Planned Therapy Interventions (PT) balance training;bed mobility training;gait training;home exercise program;ROM (range of motion);patient/family education;stair training;strengthening;stretching;transfer training  -SC               User Key  (r) = Recorded By, (t) = Taken By, (c) = Cosigned By      Initials Name Provider Type    SC Yanni Clark, PT Physical Therapist                   Clinical Impression       Row Name 05/14/25 1815          Pain     Pretreatment Pain Rating 3/10  -SC     Posttreatment Pain Rating 3/10  -SC     Pain Location hip  -SC     Pain Side/Orientation right  -SC     Pain Management Interventions positioning techniques utilized  -SC     Response to Pain Interventions functional ability unchanged  -SC       Row Name 05/14/25 1815          Plan of Care Review    Plan of Care Reviewed With patient  -SC     Outcome Evaluation Patient was able to get up to recliner post surgery and initiate therapeutic exercise. Further ambulation was deferred due to c/o dizziness. This patient is below her baseline function and would benefit from IPR at discharge for best outcomes. Her PADD score in 6.  -SC       Row Name 05/14/25 1815          Therapy Assessment/Plan (PT)    Patient/Family Therapy Goals Statement (PT) walk  -SC     Rehab Potential (PT) good  -SC     Criteria for Skilled Interventions Met (PT) yes;meets criteria  -SC     Therapy Frequency (PT) 2 times/day  -SC     Predicted Duration of Therapy Intervention (PT) 3  -SC       Row Name 05/14/25 1815          Vital Signs    Post Systolic BP Rehab 128  -SC     Post Treatment Diastolic BP 56  -SC     Post SpO2 (%) 95  -SC     O2 Delivery Post Treatment room air  -[facility] 05/14/25 1815          Positioning and Restraints    Pre-Treatment Position in bed  -SC     Post Treatment Position chair  -SC     In Chair notified nsg;reclined;sitting;call light within reach;exit alarm on;with family/caregiver;encouraged to call for assist;with nsg  -SC               User Key  (r) = Recorded By, (t) = Taken By, (c) = Cosigned By      Initials Name Provider Type    SC Yanni Clark, PT Physical Therapist                   Outcome Measures       Row Name 05/14/25 1818          How much help from another person do you currently need...    Turning from your back to your side while in flat bed without using bedrails? 3  -SC     Moving from lying on back to sitting on the side of a flat bed without  bedrails? 2  -SC     Moving to and from a bed to a chair (including a wheelchair)? 2  -SC     Standing up from a chair using your arms (e.g., wheelchair, bedside chair)? 3  -SC     Climbing 3-5 steps with a railing? 2  -SC     To walk in hospital room? 2  -SC     AM-PAC 6 Clicks Score (PT) 14  -SC     Highest Level of Mobility Goal Move to Chair/Commode-4  -SC       Row Name 05/14/25 1818          PADD    Diagnosis 2  -SC     Gender 1  -SC     Age Group 0  -SC     Gait Distance 0  -SC     Assist Level 0  -SC     Home Support 3  -SC     PADD Score 6  -SC     Prediction by PADD Score extended rehabilitation  -SC       Row Name 05/14/25 1818          Functional Assessment    Outcome Measure Options AM-PAC 6 Clicks Basic Mobility (PT);PADD  -SC               User Key  (r) = Recorded By, (t) = Taken By, (c) = Cosigned By      Initials Name Provider Type    SC Yanni Clark PT Physical Therapist                                 Physical Therapy Education       Title: PT OT SLP Therapies (Done)       Topic: Physical Therapy (Done)       Point: Mobility training (Done)       Learning Progress Summary            Patient Eager, E,D,H,TB, VU by SC at 5/14/2025 1819    Comment: reviewed HEP                      Point: Home exercise program (Done)       Learning Progress Summary            Patient Eager, E,D,H,TB, VU by SC at 5/14/2025 1819    Comment: reviewed HEP                      Point: Body mechanics (Done)       Learning Progress Summary            Patient Eager, E,D,H,TB, VU by SC at 5/14/2025 1819    Comment: reviewed HEP                      Point: Precautions (Done)       Learning Progress Summary            Patient Eager, E,D,H,TB, VU by SC at 5/14/2025 1819    Comment: reviewed HEP                                      User Key       Initials Effective Dates Name Provider Type Discipline    SC 02/03/23 -  Yanni Clark PT Physical Therapist PT                  PT Recommendation and Plan  Planned Therapy  Interventions (PT): balance training, bed mobility training, gait training, home exercise program, ROM (range of motion), patient/family education, stair training, strengthening, stretching, transfer training  Outcome Evaluation: Patient was able to get up to recliner post surgery and initiate therapeutic exercise. Further ambulation was deferred due to c/o dizziness. This patient is below her baseline function and would benefit from IPR at discharge for best outcomes. Her PADD score in 6.     Time Calculation:   PT Evaluation Complexity  History, PT Evaluation Complexity: 3 or more personal factors and/or comorbidities  Examination of Body Systems (PT Eval Complexity): total of 4 or more elements  Clinical Presentation (PT Evaluation Complexity): evolving  Clinical Decision Making (PT Evaluation Complexity): moderate complexity  Overall Complexity (PT Evaluation Complexity): moderate complexity     PT Charges       Row Name 05/14/25 1727             Time Calculation    Start Time 1727  -SC      PT Received On 05/14/25  -SC      PT Goal Re-Cert Due Date 05/24/25  -SC         Untimed Charges    PT Eval/Re-eval Minutes 47  -SC         Total Minutes    Untimed Charges Total Minutes 47  -SC       Total Minutes 47  -SC                User Key  (r) = Recorded By, (t) = Taken By, (c) = Cosigned By      Initials Name Provider Type    SC Yanni Clark, PT Physical Therapist                  Therapy Charges for Today       Code Description Service Date Service Provider Modifiers Qty    [id number]  PT EVAL MOD COMPLEXITY 4 5/14/2025 Yanni Clark PT GP 1            PT G-Codes  Outcome Measure Options: AM-PAC 6 Clicks Basic Mobility (PT), PADD  AM-PAC 6 Clicks Score (PT): 14  PT Discharge Summary  Anticipated Discharge Disposition (PT): inpatient rehabilitation facility    Yanni Clark, PT  5/14/2025

## 2025-05-14 NOTE — ANESTHESIA PREPROCEDURE EVALUATION
Anesthesia Evaluation     Patient summary reviewed and Nursing notes reviewed                Airway   Mallampati: II  TM distance: >3 FB  Neck ROM: full  No difficulty expected  Dental - normal exam   (+) upper dentures    Pulmonary - negative pulmonary ROS and normal exam   Cardiovascular - normal exam    (+) hypertension, CAD, dysrhythmias (eliquis tx; last dose = 3.5 days ago) Paroxysmal Atrial Fib    ROS comment:   RBBB    Echo 2/25: normal EF, mild MR, mild-mod TR (RVSP 35-45), patent PFO    Neuro/Psych- negative ROS  GI/Hepatic/Renal/Endo    (+) GERD, renal disease- CRI    Musculoskeletal (-) negative ROS    Abdominal  - normal exam    Bowel sounds: normal.   Substance History - negative use     OB/GYN negative ob/gyn ROS         Other                      Anesthesia Plan    ASA 3     spinal     Reason for not using neuraxial anesthesia or peripheral nerve block: Actual Difficulty Encountered (unable to obtain spinal)  intravenous induction     Anesthetic plan, risks, benefits, and alternatives have been provided, discussed and informed consent has been obtained with: patient.    Plan discussed with CRNA.    CODE STATUS:

## 2025-05-14 NOTE — PLAN OF CARE
Problem: Adult Inpatient Plan of Care  Goal: Plan of Care Review  Recent Flowsheet Documentation  Taken 5/14/2025 1815 by Yanni Clark, PT  Outcome Evaluation: Patient was able to get up to recliner post surgery and initiate therapeutic exercise. Further ambulation was deferred due to c/o dizziness. This patient is below her baseline function and would benefit from IPR at discharge for best outcomes. Her PADD score in 6.  Plan of Care Reviewed With: patient   Goal Outcome Evaluation:  Plan of Care Reviewed With: patient           Outcome Evaluation: Patient was able to get up to recliner post surgery and initiate therapeutic exercise. Further ambulation was deferred due to c/o dizziness. This patient is below her baseline function and would benefit from IPR at discharge for best outcomes. Her PADD score in 6.    Anticipated Discharge Disposition (PT): inpatient rehabilitation facility

## 2025-05-14 NOTE — ANESTHESIA POSTPROCEDURE EVALUATION
Patient: Khushbu Bledsoe    Procedure Summary       Date: 05/14/25 Room / Location:  [facility]  /  [facility]    Anesthesia Start: 1007 Anesthesia Stop: 1226    Procedure: TOTAL HIP ARTHROPLASTY ANTERIOR (Right: Hip) Diagnosis:       Arthritis of right hip      (Arthritis of right hip [[id number]])    Surgeons: Julian Mcgee MD Provider: Nelson Aguila MD    Anesthesia Type: spinal ASA Status: 3            Anesthesia Type: spinal    Vitals  Vitals Value Taken Time   BP     Temp     Pulse 67 05/14/25 12:24   Resp     SpO2 98 % 05/14/25 12:24   Vitals shown include unfiled device data.        Post Anesthesia Care and Evaluation    Patient location during evaluation: PACU  Patient participation: complete - patient participated  Level of consciousness: awake  Pain score: 0  Pain management: adequate    Airway patency: patent  Anesthetic complications: No anesthetic complications  PONV Status: none  Cardiovascular status: acceptable and stable  Respiratory status: nasal cannula, unassisted, acceptable and spontaneous ventilation  Hydration status: acceptable

## 2025-05-15 LAB
ANION GAP SERPL CALCULATED.3IONS-SCNC: 13 MMOL/L (ref 5–15)
BUN SERPL-MCNC: 58 MG/DL (ref 8–23)
BUN/CREAT SERPL: 31.7 (ref 7–25)
CALCIUM SPEC-SCNC: 8.1 MG/DL (ref 8.2–9.6)
CHLORIDE SERPL-SCNC: 104 MMOL/L (ref 98–107)
CO2 SERPL-SCNC: 20 MMOL/L (ref 22–29)
CREAT SERPL-MCNC: 1.83 MG/DL (ref 0.57–1)
DEPRECATED RDW RBC AUTO: 46.9 FL (ref 37–54)
EGFRCR SERPLBLD CKD-EPI 2021: 25.8 ML/MIN/1.73
ERYTHROCYTE [DISTWIDTH] IN BLOOD BY AUTOMATED COUNT: 14.7 % (ref 12.3–15.4)
GLUCOSE SERPL-MCNC: 186 MG/DL (ref 65–99)
HCT VFR BLD AUTO: 22.4 % (ref 34–46.6)
HGB BLD-MCNC: 7 G/DL (ref 12–15.9)
MCH RBC QN AUTO: 27.1 PG (ref 26.6–33)
MCHC RBC AUTO-ENTMCNC: 31.3 G/DL (ref 31.5–35.7)
MCV RBC AUTO: 86.8 FL (ref 79–97)
PLATELET # BLD AUTO: 224 10*3/MM3 (ref 140–450)
PMV BLD AUTO: 9 FL (ref 6–12)
POTASSIUM SERPL-SCNC: 4.9 MMOL/L (ref 3.5–5.2)
RBC # BLD AUTO: 2.58 10*6/MM3 (ref 3.77–5.28)
SODIUM SERPL-SCNC: 137 MMOL/L (ref 136–145)
WBC NRBC COR # BLD AUTO: 10.58 10*3/MM3 (ref 3.4–10.8)

## 2025-05-15 PROCEDURE — A9270 NON-COVERED ITEM OR SERVICE: HCPCS | Performed by: INTERNAL MEDICINE

## 2025-05-15 PROCEDURE — 97530 THERAPEUTIC ACTIVITIES: CPT

## 2025-05-15 PROCEDURE — 97535 SELF CARE MNGMENT TRAINING: CPT

## 2025-05-15 PROCEDURE — 94799 UNLISTED PULMONARY SVC/PX: CPT

## 2025-05-15 PROCEDURE — 63710000001 ACETAMINOPHEN EXTRA STRENGTH 500 MG TABLET: Performed by: ORTHOPAEDIC SURGERY

## 2025-05-15 PROCEDURE — A9270 NON-COVERED ITEM OR SERVICE: HCPCS | Performed by: ORTHOPAEDIC SURGERY

## 2025-05-15 PROCEDURE — 63710000001 SENNOSIDES-DOCUSATE 8.6-50 MG TABLET: Performed by: INTERNAL MEDICINE

## 2025-05-15 PROCEDURE — 94761 N-INVAS EAR/PLS OXIMETRY MLT: CPT

## 2025-05-15 PROCEDURE — 97110 THERAPEUTIC EXERCISES: CPT

## 2025-05-15 PROCEDURE — 63710000001 APIXABAN 2.5 MG TABLET: Performed by: NURSE PRACTITIONER

## 2025-05-15 PROCEDURE — 80048 BASIC METABOLIC PNL TOTAL CA: CPT | Performed by: ORTHOPAEDIC SURGERY

## 2025-05-15 PROCEDURE — 85027 COMPLETE CBC AUTOMATED: CPT | Performed by: NURSE PRACTITIONER

## 2025-05-15 PROCEDURE — 94664 DEMO&/EVAL PT USE INHALER: CPT

## 2025-05-15 PROCEDURE — 97165 OT EVAL LOW COMPLEX 30 MIN: CPT

## 2025-05-15 PROCEDURE — 63710000001 GABAPENTIN 100 MG CAPSULE: Performed by: INTERNAL MEDICINE

## 2025-05-15 PROCEDURE — A9270 NON-COVERED ITEM OR SERVICE: HCPCS | Performed by: NURSE PRACTITIONER

## 2025-05-15 RX ADMIN — GABAPENTIN 100 MG: 100 CAPSULE ORAL at 08:17

## 2025-05-15 RX ADMIN — GABAPENTIN 100 MG: 100 CAPSULE ORAL at 20:01

## 2025-05-15 RX ADMIN — IPRATROPIUM BROMIDE 0.5 MG: 0.5 SOLUTION RESPIRATORY (INHALATION) at 07:21

## 2025-05-15 RX ADMIN — ACETAMINOPHEN 1000 MG: 500 TABLET ORAL at 12:42

## 2025-05-15 RX ADMIN — ACETAMINOPHEN 1000 MG: 500 TABLET ORAL at 04:39

## 2025-05-15 RX ADMIN — ACETAMINOPHEN 1000 MG: 500 TABLET ORAL at 20:01

## 2025-05-15 RX ADMIN — GABAPENTIN 100 MG: 100 CAPSULE ORAL at 16:22

## 2025-05-15 RX ADMIN — IPRATROPIUM BROMIDE 0.5 MG: 0.5 SOLUTION RESPIRATORY (INHALATION) at 20:32

## 2025-05-15 RX ADMIN — DOCUSATE SODIUM 50 MG AND SENNOSIDES 8.6 MG 2 TABLET: 8.6; 5 TABLET, FILM COATED ORAL at 08:15

## 2025-05-15 RX ADMIN — APIXABAN 2.5 MG: 2.5 TABLET, FILM COATED ORAL at 08:17

## 2025-05-15 RX ADMIN — APIXABAN 2.5 MG: 2.5 TABLET, FILM COATED ORAL at 20:01

## 2025-05-15 NOTE — PROGRESS NOTES
"  Orthopedic Progress Note      Patient: Khushbu Bledsoe  YOB: 1934     Date of Admission: 5/14/2025  7:03 AM Medical Record Number: [id number]     Attending Physician: Julian Mcgee MD    Status Post:  Procedure(s):  TOTAL HIP ARTHROPLASTY ANTERIOR RIGHT Post Operative Day Number: 1    Subjective : No new orthopaedic complaints     Pain Relief: some relief with present medication.     Systemic Complaints: No Complaints  Vitals:    05/15/25 0344 05/15/25 0659 05/15/25 0721 05/15/25 0815   BP: 102/47 115/50  99/53   BP Location: Right arm Right arm     Patient Position: Lying Lying     Pulse: 68 65 62 72   Resp: 16 16     Temp: 97 °F (36.1 °C) 98 °F (36.7 °C)     TempSrc: Oral Oral     SpO2: 91% 96% 94%    Weight:       Height:           Physical Exam: 89 y.o. female    General Appearance:       Alert, cooperative, in no acute distress                  Extremities:    Dressing Clean, Dry and Intact             No clinical sign of DVT        Able to do good movements of digits    Pulses:   Pulses palpable and equal bilaterally           Diagnostic Tests:             Results from last 7 days   Lab Units 05/14/25  0828   INR  1.06   APTT seconds 41.0*     No results found for: \"URICACID\"  No results found for: \"CRYSTAL\"  Microbiology Results (last 10 days)       Procedure Component Value - Date/Time    MRSA Screen Culture (Outpatient) - Swab, Nares [[id number]]  (Normal) Collected: 05/07/25 1329    Lab Status: Final result Specimen: Swab from Nares Updated: 05/08/25 1610     MRSA Screen Cx No Methicillin Resistant Staphylococcus aureus isolated    Narrative:      The negative predictive value of this diagnostic test is high and should only be used to consider de-escalating anti-MRSA therapy. A positive result may indicate colonization with MRSA and must be correlated clinically.            XR Hip With or Without Pelvis 2 - 3 View Right  Result Date: 5/14/2025  Impression: Total hip arthroplasty with " findings as described. Osteopenia. Electronically Signed: Danyelle Knox MD  5/14/2025 1:07 PM EDT  Workstation ID: [id number]        Current Medications:  Scheduled Meds:acetaminophen, 1,000 mg, Oral, Q8H  apixaban, 2.5 mg, Oral, Q12H  [Held by provider] bumetanide, 1 mg, Oral, Daily  carvedilol, 6.25 mg, Oral, BID With Meals  gabapentin, 100 mg, Oral, TID  hydrALAZINE, 50 mg, Oral, TID  ipratropium, 0.5 mg, Nebulization, TID - RT  lisinopril, 40 mg, Oral, QAM  NIFEdipine XL, 30 mg, Oral, Daily  senna-docusate sodium, 2 tablet, Oral, BID      Continuous Infusions:lactated ringers, 100 mL/hr, Last Rate: 100 mL/hr (05/14/25 6711)      PRN Meds:.  albuterol    senna-docusate sodium **AND** polyethylene glycol **AND** bisacodyl **AND** bisacodyl    HYDROmorphone **AND** naloxone    labetalol    ondansetron    oxyCODONE    oxyCODONE    sodium chloride    traMADol    Assessment: Status post  SC ARTHRP ACETBLR/PROX FEM PROSTC AGRFT/ALGRFT [63425] (TOTAL HIP ARTHROPLASTY ANTERIOR)    Patient Active Problem List   Diagnosis    AF (paroxysmal atrial fibrillation)    Acute on chronic diastolic CHF (congestive heart failure)    Essential hypertension    Nonrheumatic mitral valve regurgitation    Lumbar radiculopathy    Age-related physical debility    Loss of balance    Lumbar canal stenosis    Difficulty breathing    CKD (chronic kidney disease) stage 4, GFR 15-29 ml/min    Arthritis of right hip    Hyperglycemia    Status post total replacement of right hip    CHF (congestive heart failure)    Elevated hemoglobin A1c       PLAN:   Continues current post-op course  Anticoagulation: eliquis 2.5 bid for 1 week  Mobilize with PT as tolerated per protocol    Weight Bearing: WBAT  Discharge Plan: OK to plan for discharge in  today to rehab  from orthopaedic perspective.    Julian Mcgee MD    Date: 5/15/2025    Time: 10:26 EDT

## 2025-05-15 NOTE — PLAN OF CARE
Goal Outcome Evaluation:  Plan of Care Reviewed With: patient        Progress: improving  Outcome Evaluation: Pt continues to present with decreased functional mobility and strength deficits limiting independence. Pt ambulated 35ft with min A and RW for support. Continue to progress per pt tolerance.    Anticipated Discharge Disposition (PT): inpatient rehabilitation facility

## 2025-05-15 NOTE — THERAPY EVALUATION
Patient Name: Khushbu Bledsoe  : 3/17/1934    MRN: [id number]                              Today's Date: 5/15/2025       Admit Date: 2025    Visit Dx: No diagnosis found.  Patient Active Problem List   Diagnosis    AF (paroxysmal atrial fibrillation)    Acute on chronic diastolic CHF (congestive heart failure)    Essential hypertension    Nonrheumatic mitral valve regurgitation    Lumbar radiculopathy    Age-related physical debility    Loss of balance    Lumbar canal stenosis    Difficulty breathing    CKD (chronic kidney disease) stage 4, GFR 15-29 ml/min    Arthritis of right hip    Hyperglycemia    Status post total replacement of right hip    CHF (congestive heart failure)    Elevated hemoglobin A1c     Past Medical History:   Diagnosis Date    AF (paroxysmal atrial fibrillation) 09/10/2024    Arthritis 2010    Cervical disc disorder 2010    CHF (congestive heart failure)     CKD (chronic kidney disease) stage 4, GFR 15-29 ml/min 2025    Congenital heart disease     Coronary artery disease     History of shingles 2024    Hypertension     Low back pain 2010    Lumbosacral disc disease 2010    Thoracic disc disorder      Past Surgical History:   Procedure Laterality Date    BACK SURGERY  2010    CATARACT EXTRACTION Bilateral     COLONOSCOPY      JOINT REPLACEMENT  2025    RETINAL DETACHMENT REPAIR Left     SPINAL FUSION  2010    TOTAL HIP ARTHROPLASTY Right 2025    Procedure: TOTAL HIP ARTHROPLASTY ANTERIOR RIGHT;  Surgeon: Julian Mcgee MD;  [facility];  Service: Orthopedics;  Laterality: Right;      General Information       Row Name 05/15/25 1452          OT Time and Intention    Document Type evaluation  -JY     Mode of Treatment occupational therapy;individual therapy  -JY       Row Name 05/15/25 1452          General Information    Patient Profile Reviewed yes  -JY     Prior Level of Function independent:;all household mobility;gait;transfer;bed  mobility;ADL's;feeding;grooming;dressing;bathing;dependent:;home management;cooking;cleaning;driving  pt I in all self care tasks with difficulty/increased time and effort for LBD d/t R hip pain, bathing; dep on DIL for house mgmt, cooking, cleaning and family for transportation; no AD used until last ~ 1 month at which time pt used FWW AAT; denies falls  -JY     Existing Precautions/Restrictions fall;hip, anterior;other (see comments)  WBAT RLE  -JY     Barriers to Rehab previous functional deficit  -JY       Row Name 05/15/25 1452          Occupational Profile    Environmental Supports and Barriers (Occupational Profile) step over tub w/ seat and grab bars, standard commode height with B UE supports, DME: has FWW and using for ~ 1 month d/t hip pain  -JY     Patient Goals (Occupational Profile) to return to PLOF  -JY       Row Name 05/15/25 1452          Living Environment    Current Living Arrangements home  -JY     People in Home child(jf), adult;other (see comments)  son and DIL  -JY       Row Name 05/15/25 1452          Home Main Entrance    Number of Stairs, Main Entrance one  -JY     Stair Railings, Main Entrance none  -JY       Row Name 05/15/25 1452          Stairs Within Home, Primary    Number of Stairs, Within Home, Primary none  -JY       Row Name 05/15/25 1452          Cognition    Orientation Status (Cognition) oriented x 3  -JY       Row Name 05/15/25 1452          Safety Issues/Impairments Affecting Functional Mobility    Safety Issues Affecting Function (Mobility) awareness of need for assistance;insight into deficits/self-awareness;safety precaution awareness;sequencing abilities  -JY     Impairments Affecting Function (Mobility) pain;strength;range of motion (ROM);balance  -JY     Comment, Safety Issues/Impairments (Mobility) pt alert and able to follow commands; intermittent cues for optimal safety and best tech  -JY               User Key  (r) = Recorded By, (t) = Taken By, (c) = Cosigned By       Initials Name Provider Type    Joi Rebollar, OT Occupational Therapist                     Mobility/ADL's       Row Name 05/15/25 1500          Bed Mobility    Bed Mobility sit-supine;scooting/bridging  -JY     Scooting/Bridging [place] (Bed Mobility) standby assist;other (see comments)  w/ increased time and effort to elevate hips and scoot self toward HOB  -JY     Sit-Supine [place] (Bed Mobility) minimum assist (75% patient effort);verbal cues  -JY     Bed Mobility, Safety Issues decreased use of arms for pushing/pulling;decreased use of legs for bridging/pushing  -JY     Assistive Device (Bed Mobility) head of bed elevated  -JY     Comment, (Bed Mobility) pt received sitting at EOB and requested to return to bed after OT interventions; req'd min A to aid in elevating LEs to height of bed with need to swing both at same time for momentum to raise; increased time and effort to elevate hips and scoot self toward HOB; increased fatigue with pushing through UEs to aid in more upright sitting within bed  -JY       Row Name 05/15/25 1500          Transfers    Transfers sit-stand transfer;stand-sit transfer;toilet transfer  -JY     Comment, (Transfers) skilled cues for optimal hand placement for controlled ascend, descend specifically to push up from seated surface, to reach back prior to sitting once aligned and in close proximity to seated surface; emphasized stepping surgical LE forward prior to sitting to decrease pain and stepping simultaneously with movement of FWW to avoid pivot at hip  -JY       Row Name 05/15/25 1500          Sit-Stand Transfer    Sit-Stand [place] (Transfers) minimum assist (75% patient effort);verbal cues  -JY     Assistive Device (Sit-Stand Transfers) walker, front-wheeled  -JKANIKA       Row Name 05/15/25 1500          Stand-Sit Transfer    Stand-Sit [place] (Transfers) minimum assist (75% patient effort);verbal cues  -JY     Assistive Device (Stand-Sit Transfers)  walker, front-wheeled  -nVoqKANIKA       Row Name 05/15/25 1500          Toilet Transfer    Type (Toilet Transfer) sit-stand;stand-sit  -Y     [place] Level (Toilet Transfer) minimum assist (75% patient effort);verbal cues  -     Assistive Device (Toilet Transfer) commode;grab bars/safety frame;raised toilet seat;walker, front-wheeled  -DANAY     Comment, (Toilet Transfer) increased time to complete full descend to toilet given lower height, cued for use of grab bar to R side vs continued support at FWW  -[employer]       Row Name 05/15/25 1500          Functional Mobility    Functional Mobility- Ind. Level minimum assist (75% patient effort);contact guard assist;verbal cues required  -     Functional Mobility- Device walker, front-wheeled  -nVoqKANIKA     Functional Mobility-Distance (Feet) --  in room ADL related mobility  -     Functional Mobility- Comment defer to PT for specifics however during in room ADL related mobility pt req'd gross CGA to min A with FWW used; intermittent cues for most optimal use of AD and proximity of support to pt and segmental stepping in turning to avoid pivot at hips  -[employer] Name 05/15/25 1500          Activities of Daily Living    BADL Assessment/Intervention upper body dressing;lower body dressing;grooming;bathing  -[employer] Name 05/15/25 1500          Mobility    Extremity Weight-bearing Status right lower extremity  -     Right Lower Extremity (Weight-bearing Status) weight-bearing as tolerated (WBAT)  -[employer] Name 05/15/25 1500          Upper Body Dressing Assessment/Training    [place] Level (Upper Body Dressing) doff;don;pajama/robe;minimum assist (75% patient effort)  -     Position (Upper Body Dressing) edge of bed sitting  -     Comment, (Upper Body Dressing) min A for proximal and posterior mgmt of gown  -[employer] Name 05/15/25 1500          Lower Body Dressing Assessment/Training    [place] Level (Lower Body Dressing) socks;doff;standby  assist;don;moderate assist (50% patient effort);maximum assist (25% patient effort);verbal cues;other (see comments)  when pt used  AE req'd gross CGA in re donning socks  -J     Position (Lower Body Dressing) edge of bed sitting  -     Comment, (Lower Body Dressing) mod to max A to re don socks w/o AE d/t post surgery impact at R hip, pt with limited reach distally and u/a to bring LE more proximally; educated pt on  AE and issued for further training; pt demonstrated improved I to CGA for donning socks with use of  sock aid  -[facility] Name 05/15/25 1500          Grooming Assessment/Training    [place] Level (Grooming) wash face, hands;set up  -     Position (Grooming) sitting up in bed  -[facility] Name 05/15/25 1500          Bathing Assessment/Intervention    [place] Level (Bathing) bathing skills;not tested  -     Assistive Devices (Bathing) long-handled sponge  -     Comment, (Bathing) issued pt  sponge and demonstrated use for LBB after observation of decreased distal reach and impact on reaching feet and lower legs for washing; pt able to demo improved reach with simulated trial, anticipate greater I with use  -               User Key  (r) = Recorded By, (t) = Taken By, (c) = Cosigned By      Initials Name Provider Type    Joi Rebollar, OT Occupational Therapist                   Obj/Interventions       [facility] Name 05/15/25 1512          Sensory Assessment (Somatosensory)    Sensory Assessment (Somatosensory) bilateral UE;sensation intact  -     Bilateral UE Sensory Assessment general sensation;light touch awareness  -     Sensory Assessment denies any numbness or tingling and able to recognize LT stimuli as intact and symmetrical at BUEs  -[facility] Name 05/15/25 1512          Vision Assessment/Intervention    Visual Impairment/Limitations corrective lenses full-time  -     Vision Assessment Comment denies any acute changes to vision  -JY       Row Name 05/15/25  1512          Range of Motion Comprehensive    General Range of Motion bilateral upper extremity ROM WFL  -JY       Row Name 05/15/25 1512          Strength Comprehensive (MMT)    General Manual Muscle Testing (MMT) Assessment upper extremity strength deficits identified  -JY     Comment, General Manual Muscle Testing (MMT) Assessment BUE MMS grossly 4/5 per MMT  -JY       Row Name 05/15/25 1512          Motor Skills    Motor Skills functional endurance;coordination  -JY     Coordination bilateral;upper extremity;finger to nose;other (see comments);WFL  finger thumb opposition  -JY     Functional Endurance decreased activity tolerance toward more dynamic demands; fatigued with more dynamic demands of ADL routine, improved with LH AE use  -JY       Row Name 05/15/25 1512          Balance    Balance Assessment sitting static balance;sitting dynamic balance;standing static balance;standing dynamic balance  -JY     Static Sitting Balance standby assist  -JY     Dynamic Sitting Balance contact guard;other (see comments)  LBD  -JY     Position, Sitting Balance unsupported;sitting edge of bed  -JY     Static Standing Balance contact guard  -JY     Dynamic Standing Balance minimal assist;verbal cues  -JY     Position/Device Used, Standing Balance supported;walker, front-wheeled  -JY     Balance Interventions sitting;standing;static;dynamic;sit to stand;supported;occupation based/functional task  -JY     Comment, Balance no overt LOB during seated or standing tasks; intermittent cues for most available balance, used FWW in standing  -JY               User Key  (r) = Recorded By, (t) = Taken By, (c) = Cosigned By      Initials Name Provider Type    Joi Rebollar OT Occupational Therapist                   Goals/Plan       Row Name 05/15/25 1523          Transfer Goal 1 (OT)    Activity/Assistive Device (Transfer Goal 1, OT) sit-to-stand/stand-to-sit;bed-to-chair/chair-to-bed;toilet;commode;walker, rolling  -JY      [place] Level/Cues Needed (Transfer Goal 1, OT) contact guard required;verbal cues required  -JY     Time Frame (Transfer Goal 1, OT) long term goal (LTG);5 days  -JY     Progress/Outcome (Transfer Goal 1, OT) new goal  -JY       Row Name 05/15/25 1523          Dressing Goal 1 (OT)    Activity/Device (Dressing Goal 1, OT) upper body dressing;lower body dressing;other (see comments)  d/d TB garments w/ LH AE PRN  -JY     [place]/Cues Needed (Dressing Goal 1, OT) contact guard required;standby assist;verbal cues required  -JY     Time Frame (Dressing Goal 1, OT) long term goal (LTG);5 days  -JY     Progress/Outcome (Dressing Goal 1, OT) new goal  -JY       Row Name 05/15/25 1523          Toileting Goal 1 (OT)    Activity/Device (Toileting Goal 1, OT) adjust/manage clothing;perform perineal hygiene;commode;commode, bedside without drop arms;grab bar/safety frame;raised toilet seat  -JY     [place] Level/Cues Needed (Toileting Goal 1, OT) contact guard required;verbal cues required  -JY     Time Frame (Toileting Goal 1, OT) long term goal (LTG);5 days  -JY     Progress/Outcome (Toileting Goal 1, OT) new goal  -JY       Row Name 05/15/25 1523          Strength Goal 1 (OT)    Strength Goal 1 (OT) Pt to complete seated HEP encompassing BUEs targeting strength and endurance with progressive sets/reps/resistance in order to improve integration in ADLs, related t/fs and mobility as appropriate  -JY     Time Frame (Strength Goal 1, OT) short term goal (STG);3 days  -JY     Progress/Outcome (Strength Goal 1, OT) new goal  -JY       Row Name 05/15/25 1523          Therapy Assessment/Plan (OT)    Planned Therapy Interventions (OT) activity tolerance training;adaptive equipment training;functional balance retraining;occupation/activity based interventions;patient/caregiver education/training;ROM/therapeutic exercise;strengthening exercise;transfer/mobility retraining  -JY               User Key  (r) = Recorded By,  (t) = Taken By, (c) = Cosigned By      Initials Name Provider Type    Joi Rebollar, VERONIQUE Occupational Therapist                   Clinical Impression       Row Name 05/15/25 1517          Pain Assessment    Pretreatment Pain Rating 0/10 - no pain  -JY     Posttreatment Pain Rating 2/10  -JY     Pain Location hip  -JY     Pain Side/Orientation right  -JY     Pain Management Interventions activity modification encouraged;exercise or physical activity utilized;positioning techniques utilized  -JY     Response to Pain Interventions activity level improved;functional ability improved;activity participation with increased pain  -JY     Pre/Posttreatment Pain Comment mobility and function improved with some increase in pain at R hip  -JY       Row Name 05/15/25 1517          Plan of Care Review    Plan of Care Reviewed With patient  -JY     Progress no change  OT IE  -JY     Outcome Evaluation OT evaluation completed. Pt presents with decreased I in ADLs, related t/fs and mobility compared to PLOF limited by decreased activity tolerance, impaired balance, muscle weakness at BUEs, decreased distal reach impacting ADLs and fatigue toward more dynamic demands. OT issued LH AE to assist w/ distal reach w/ anticipated increase in I as evidenced with d/d socks with sock aid, reacher. Pt req'd variable A for ADLs and gross CGA to min A for ADL related t/fs and mobility with FWW. Pt is below occupational performance baseline and would benefit from IPOT POC and IRF at d/c when medically ready.  -JY       Row Name 05/15/25 1517          Therapy Assessment/Plan (OT)    Patient/Family Therapy Goal Statement (OT) to return to PLOF  -JY     Rehab Potential (OT) good  -JY     Criteria for Skilled Therapeutic Interventions Met (OT) yes;skilled treatment is necessary  -JY     Therapy Frequency (OT) daily  -JY     Predicted Duration of Therapy Intervention (OT) 5 days  -JY       Row Name 05/15/25 6197          Therapy Plan  Review/Discharge Plan (OT)    Equipment Needs Upon Discharge (OT) dressing equipment;bathing equipment  -JY     Anticipated Discharge Disposition (OT) inpatient rehabilitation facility  -JY       Row Name 05/15/25 1517          Vital Signs    Pre Systolic BP Rehab 116  -JY     Pre Treatment Diastolic BP 51  -JY     Pre SpO2 (%) 97  -JY     O2 Delivery Pre Treatment room air  -JY     O2 Delivery Intra Treatment room air  -JY     Post SpO2 (%) 97  -JY     O2 Delivery Post Treatment room air  -JY     Pre Patient Position Sitting  -JY     Intra Patient Position Standing  -JY     Post Patient Position Supine  -JY       Row Name 05/15/25 1517          Positioning and Restraints    Pre-Treatment Position other (comment)  at EOB  -JY     Post Treatment Position bed  -JY     In Bed notified nsg;fowlers;call light within reach;encouraged to call for assist;exit alarm on;side rails up x2;SCD pump applied  -JY               User Key  (r) = Recorded By, (t) = Taken By, (c) = Cosigned By      Initials Name Provider Type    Joi Rebollar, VERONIQUE Occupational Therapist                   Outcome Measures       Row Name 05/15/25 1524          How much help from another is currently needed...    Putting on and taking off regular lower body clothing? 3  -JY     Bathing (including washing, rinsing, and drying) 2  -JY     Toileting (which includes using toilet bed pan or urinal) 2  -JY     Putting on and taking off regular upper body clothing 3  -JY     Taking care of personal grooming (such as brushing teeth) 3  -JY     Eating meals 4  -JY     AM-PAC 6 Clicks Score (OT) 17  -JY       Row Name 05/15/25 0956 05/15/25 0830       How much help from another person do you currently need...    Turning from your back to your side while in flat bed without using bedrails? 3  -AE 3  -OD    Moving from lying on back to sitting on the side of a flat bed without bedrails? 3  -AE 3  -OD    Moving to and from a bed to a chair (including a wheelchair)?  3  -AE 3  -OD    Standing up from a chair using your arms (e.g., wheelchair, bedside chair)? 3  -AE 3  -OD    Climbing 3-5 steps with a railing? 2  -AE 2  -OD    To walk in hospital room? 3  -AE 3  -OD    AM-PAC 6 Clicks Score (PT) 17  -AE 17  -OD    Highest Level of Mobility Goal Stand (1 or More Minutes)-5  -AE Stand (1 or More Minutes)-5  -OD      Row Name 05/15/25 1524 05/15/25 0956       Functional Assessment    Outcome Measure Options AM-PAC 6 Clicks Daily Activity (OT)  -JY AM-PAC 6 Clicks Basic Mobility (PT)  -AE              User Key  (r) = Recorded By, (t) = Taken By, (c) = Cosigned By      Initials Name Provider Type    OD Lucille Andrade, RN Registered Nurse    Joi Rebollar, VERNOIQUE Occupational Therapist    Dean Huggins, PT Physical Therapist                    Occupational Therapy Education       Title: PT OT SLP Therapies (In Progress)       Topic: Occupational Therapy (In Progress)       Point: ADL training (In Progress)       Learning Progress Summary            Patient Acceptance, E,D, NR by DANAY at 5/15/2025 1303                      Point: Precautions (In Progress)       Learning Progress Summary            Patient Acceptance, E,D, NR by DANAY at 5/15/2025 1303                      Point: Body mechanics (In Progress)       Learning Progress Summary            Patient Acceptance, E,D, NR by DANAY at 5/15/2025 1303                                      User Key       Initials Effective Dates Name Provider Type Discipline    DANAY 06/16/21 -  Joi Szymanski OT Occupational Therapist OT                  OT Recommendation and Plan  Planned Therapy Interventions (OT): activity tolerance training, adaptive equipment training, functional balance retraining, occupation/activity based interventions, patient/caregiver education/training, ROM/therapeutic exercise, strengthening exercise, transfer/mobility retraining  Therapy Frequency (OT): daily  Plan of Care Review  Plan of Care Reviewed With:  patient  Progress: no change (OT IE)  Outcome Evaluation: OT evaluation completed. Pt presents with decreased I in ADLs, related t/fs and mobility compared to PLOF limited by decreased activity tolerance, impaired balance, muscle weakness at BUEs, decreased distal reach impacting ADLs and fatigue toward more dynamic demands. OT issued LH AE to assist w/ distal reach w/ anticipated increase in I as evidenced with d/d socks with sock aid, reacher. Pt req'd variable A for ADLs and gross CGA to min A for ADL related t/fs and mobility with FWW. Pt is below occupational performance baseline and would benefit from IPOT POC and IRF at d/c when medically ready.     Time Calculation:   Evaluation Complexity (OT)  Review Occupational Profile/Medical/Therapy History Complexity: brief/low complexity  Assessment, Occupational Performance/Identification of Deficit Complexity: 1-3 performance deficits  Clinical Decision Making Complexity (OT): problem focused assessment/low complexity  Overall Complexity of Evaluation (OT): low complexity     Time Calculation- OT       Row Name 05/15/25 1526             Time Calculation- OT    OT Start Time 1303  -JY      OT Received On 05/15/25  -JY      OT Goal Re-Cert Due Date 05/25/25  -JY         Timed Charges    07161 - OT Therapeutic Activity Minutes 12  -JY      89846 - OT Self Care/Mgmt Minutes 20  -JY         Untimed Charges    OT Eval/Re-eval Minutes 50  -JY         Total Minutes    Timed Charges Total Minutes 32  -JY      Untimed Charges Total Minutes 50  -JY       Total Minutes 82  -JY                User Key  (r) = Recorded By, (t) = Taken By, (c) = Cosigned By      Initials Name Provider Type    Joi Rebollar OT Occupational Therapist                  Therapy Charges for Today       Code Description Service Date Service Provider Modifiers Qty    54281377746  OT THERAPEUTIC ACT EA 15 MIN 5/15/2025 Joi Szymanski OT GO 1    60040148946 HC OT EVAL LOW COMPLEXITY 4 5/15/2025  Young, Joi, OT GO 1    83500684917 HC OT SELF CARE/MGMT/TRAIN EA 15 MIN 5/15/2025 Joi Szymanski, VERONIQUE GO 1                 Joi Szymanski, VERONIQUE  5/15/2025

## 2025-05-15 NOTE — PROGRESS NOTES
"IM progress note      Khushbu Bledsoe  [id number]  3/17/1934     LOS: 0 days     Attending: Julian Mcgee MD    Primary Care Provider: Ana Ross MD      Chief Complaint/Reason for visit:  No chief complaint on file.      Subjective   Doing okay.  Good pain control. BP running on the lower side, meds held.  No f/c/n/vom/sob.      Objective        Visit Vitals  BP 99/53   Pulse 72   Temp 98 °F (36.7 °C) (Oral)   Resp 16   Ht 152.4 cm (60\")   Wt 60.8 kg (134 lb)   SpO2 94%   BMI 26.17 kg/m²     Temp (24hrs), Av.5 °F (36.4 °C), Min:97 °F (36.1 °C), Max:98 °F (36.7 °C)      Intake/Output:    Intake/Output Summary (Last 24 hours) at 5/15/2025 1010  Last data filed at 5/15/2025 0900  Gross per 24 hour   Intake 1540 ml   Output 1250 ml   Net 290 ml        Physical Therapy:     Signed         Goal Outcome Evaluation:  Plan of Care Reviewed With: patient  Progress: improving  Outcome Evaluation: Pt continues to present with decreased functional mobility and strength deficits limiting independence. Pt ambulated 35ft with min A and RW for support. Continue to progress per pt tolerance.     Anticipated Discharge Disposition (PT): inpatient rehabilitation facility                 Physical Exam:     General Appearance:    Alert, cooperative, in no acute distress   Head:    Normocephalic, without obvious abnormality, atraumatic    Lungs:     Normal effort, symmetric chest rise,  clear to      auscultation bilaterally              Heart:    Regular rhythm and normal rate, normal S1 and S2    Abdomen:     Normal bowel sounds, no masses, no organomegaly, soft        nontender, nondistended, no guarding, no rebound                tenderness   Extremities:   CDI hip dressing.  Flexion and dorsiflexion intact bilateral feet.    No clubbing, cyanosis or edema.  No deformities.    Pulses:   Pulses palpable and equal bilaterally   Skin:   No bleeding, bruising or rash          Results Review:     I reviewed the patient's " "new clinical results.   Labs pending at time of note.           Invalid input(s): \"NEUTOPHILPCT\"        Invalid input(s): \"LABALBU\", \"PROT\"  I reviewed the patient's new imaging including images and reports.    All medications reviewed.   acetaminophen, 1,000 mg, Oral, Q8H  apixaban, 2.5 mg, Oral, Q12H  bumetanide, 1 mg, Oral, Daily  carvedilol, 6.25 mg, Oral, BID With Meals  gabapentin, 100 mg, Oral, TID  hydrALAZINE, 50 mg, Oral, TID  ipratropium, 0.5 mg, Nebulization, TID - RT  lisinopril, 40 mg, Oral, QAM  NIFEdipine XL, 30 mg, Oral, Daily  senna-docusate sodium, 2 tablet, Oral, BID      albuterol, 2.5 mg, Q6H PRN  polyethylene glycol, 17 g, Daily PRN   And  bisacodyl, 5 mg, Daily PRN   And  bisacodyl, 10 mg, Daily PRN  HYDROmorphone, 0.5 mg, Q2H PRN   And  naloxone, 0.1 mg, Q5 Min PRN  labetalol, 10 mg, Q4H PRN  ondansetron, 4 mg, Q6H PRN  oxyCODONE, 10 mg, Q4H PRN  oxyCODONE, 5 mg, Q4H PRN  sodium chloride, 500 mL, TID PRN  traMADol, 50 mg, Q12H PRN        Assessment & Plan       Status post total replacement of right hip    AF (paroxysmal atrial fibrillation)    Essential hypertension    CKD (chronic kidney disease) stage 4, GFR 15-29 ml/min    Arthritis of right hip    CHF (congestive heart failure)    Elevated hemoglobin A1c         Plan   1. PT/OT- WBAT RLE  2. Pain control-prns       3. IS-encourage  4. DVT proph-Mechs/Eliquis at prophylactic dose for 1 week, then resume home dose  5. Bowel regimen  6. Resume home medications as appropriate  7. Monitor post-op labs  8. DC planning for rehab, d/w . Discussed with pt and son.     HTN, CHF, afib  - Continue home hydralazine, lisinopril, nifedipine and Coreg  -Hold spironolactone and Bumex for now  - Monitor BP   - Holding parameters for BP meds  - Labetalol PRN for SBP>170     CKD  - Avoid nephrotoxic agents       Ninoska Mendez MD  05/15/25  10:10 EDT    "

## 2025-05-15 NOTE — CASE MANAGEMENT/SOCIAL WORK
Continued Stay Note  [facility]     Patient Name: Khushbu Bledsoe  MRN: [id number]  Today's Date: 5/15/2025    Admit Date: 5/14/2025    Plan: rehab   Discharge Plan       Row Name 05/15/25 1243       Plan    Plan rehab    Patient/Family in Agreement with Plan yes    Plan Comments Met with patient and her son at the bedside to initiate discharge planning. Patient lives with her son and daughter-in-law in a house in [facility]. At baseline, patient is independent with ADLs and has a rolling walker, cane, shower chair, and grab bars. Patient denies any home health services or home oxygen use. Patient has [employer] Medicare with prescription benefits and prefers to fill scripts at the [facility] in [place]. Patient is agreeable to therapy's recommendation for inpatient rehab. OT ordered. CM will continue to follow and assist with discharge planning once recommendations become available.    Final Discharge Disposition Code 03 - skilled nursing facility (SNF)                   Discharge Codes    No documentation.                       David Rodgers RN

## 2025-05-15 NOTE — PLAN OF CARE
Goal Outcome Evaluation:  Plan of Care Reviewed With: patient        Progress: improving  Outcome Evaluation: Pt continues to present with decreased functional mobility and decreased activity tolerance. Pt ambulated 100ft with min A and RW for support. Continue to progress per pt tolerance.    Anticipated Discharge Disposition (PT): inpatient rehabilitation facility

## 2025-05-15 NOTE — PLAN OF CARE
Goal Outcome Evaluation:  Plan of Care Reviewed With: patient        Progress: no change (OT IE)  Outcome Evaluation: OT evaluation completed. Pt presents with decreased I in ADLs, related t/fs and mobility compared to PLOF limited by decreased activity tolerance, impaired balance, muscle weakness at BUEs, decreased distal reach impacting ADLs and fatigue toward more dynamic demands. OT issued LH AE to assist w/ distal reach w/ anticipated increase in I as evidenced with d/d socks with sock aid, reacher. Pt req'd variable A for ADLs and gross CGA to min A for ADL related t/fs and mobility with FWW. Pt is below occupational performance baseline and would benefit from IPOT POC and IRF at d/c when medically ready.    Anticipated Discharge Disposition (OT): inpatient rehabilitation facility

## 2025-05-15 NOTE — PLAN OF CARE
Goal Outcome Evaluation:  Plan of Care Reviewed With: patient        Progress: improving   Pt. Has not complained of any pain. VSS, on room air. BP has been running low so blood pressure meds held. Pt worked with therapy and sat up in the chair for several hours. Awaiting rehab placement.

## 2025-05-15 NOTE — THERAPY TREATMENT NOTE
Patient Name: Khushbu Bledsoe  : 3/17/1934    MRN: [id number]                              Today's Date: 5/15/2025       Admit Date: 2025    Visit Dx: No diagnosis found.  Patient Active Problem List   Diagnosis    AF (paroxysmal atrial fibrillation)    Acute on chronic diastolic CHF (congestive heart failure)    Essential hypertension    Nonrheumatic mitral valve regurgitation    Lumbar radiculopathy    Age-related physical debility    Loss of balance    Lumbar canal stenosis    Difficulty breathing    CKD (chronic kidney disease) stage 4, GFR 15-29 ml/min    Arthritis of right hip    Hyperglycemia    Status post total replacement of right hip    CHF (congestive heart failure)    Elevated hemoglobin A1c     Past Medical History:   Diagnosis Date    AF (paroxysmal atrial fibrillation) 09/10/2024    Arthritis 2010    Cervical disc disorder     CHF (congestive heart failure)     CKD (chronic kidney disease) stage 4, GFR 15-29 ml/min 2025    Congenital heart disease     Coronary artery disease     History of shingles 2024    Hypertension     Low back pain     Lumbosacral disc disease 2010    Thoracic disc disorder      Past Surgical History:   Procedure Laterality Date    BACK SURGERY  2010    CATARACT EXTRACTION Bilateral     COLONOSCOPY      JOINT REPLACEMENT  2025    RETINAL DETACHMENT REPAIR Left     SPINAL FUSION  2010    TOTAL HIP ARTHROPLASTY Right 2025    Procedure: TOTAL HIP ARTHROPLASTY ANTERIOR RIGHT;  Surgeon: Julian Mcgee MD;  Location: [facility];  Service: Orthopedics;  Laterality: Right;      General Information       Row Name 05/15/25 0940          Physical Therapy Time and Intention    Document Type therapy note (daily note)  -AE     Mode of Treatment physical therapy  -AE       Row Name 05/15/25 0940          General Information    Patient Profile Reviewed yes  -AE     Existing Precautions/Restrictions fall;hip;hip, anterior;right  -AE     Barriers to Rehab  previous functional deficit  -AE       Row Name 05/15/25 0940          Safety Issues/Impairments Affecting Functional Mobility    Safety Issues Affecting Function (Mobility) insight into deficits/self-awareness;safety precaution awareness;sequencing abilities  -AE     Impairments Affecting Function (Mobility) pain;strength;range of motion (ROM);motor control;balance  -AE               User Key  (r) = Recorded By, (t) = Taken By, (c) = Cosigned By      Initials Name Provider Type    AE Dean Bautista, [place] Physical Therapist                   Mobility       Row Name 05/15/25 0941          Bed Mobility    Bed Mobility supine-sit  -AE     Supine-Sit [place] (Bed Mobility) moderate assist (50% patient effort);1 person assist;verbal cues  -AE     Assistive Device (Bed Mobility) head of bed elevated  -AE     Comment, (Bed Mobility) VCs for hand placement and sequencing. Pt required increased assist to advance BLE to EOB.  -AE       Row Name 05/15/25 0941          Transfers    Comment, (Transfers) VCs for hand placement and sequencing. Pt required increased cues to push up from seated surface and not pulling up from RW.  -AE       Row Name 05/15/25 0941          Sit-Stand Transfer    Sit-Stand [place] (Transfers) minimum assist (75% patient effort);1 person assist;verbal cues  -AE     Assistive Device (Sit-Stand Transfers) walker, front-wheeled  -AE       Row Name 05/15/25 0941          Gait/Stairs (Locomotion)    [place] Level (Gait) minimum assist (75% patient effort);1 person assist;verbal cues  -AE     Assistive Device (Gait) walker, front-wheeled  -AE     Distance in Feet (Gait) 35  -AE     Deviations/Abnormal Patterns (Gait) right sided deviations;gait speed decreased;stride length decreased;weight shifting decreased;antalgic  -AE     Bilateral Gait Deviations forward flexed posture  -AE     Comment, (Gait/Stairs) Pt demo step through gait pattern with slowed kasia and decreased step length. No c/o  dizziness this session. Pt reported increased R hip pain with increased ambulation distance. Further distance limited by hip pain and weakness.  -AE       Row Name 05/15/25 0941          Mobility    Extremity Weight-bearing Status right lower extremity  -AE     Right Lower Extremity (Weight-bearing Status) weight-bearing as tolerated (WBAT)  -AE               User Key  (r) = Recorded By, (t) = Taken By, (c) = Cosigned By      Initials Name Provider Type    AE Dean Bautista PT Physical Therapist                   Obj/Interventions       Row Name 05/15/25 0951          Motor Skills    Therapeutic Exercise hip;knee;ankle  -AE       Row Name 05/15/25 0951          Hip (Therapeutic Exercise)    Hip (Therapeutic Exercise) AROM (active range of motion)  -AE     Hip AROM (Therapeutic Exercise) right;flexion;extension;aBduction;aDduction;sitting;10 repetitions  -AE       Row Name 05/15/25 0951          Knee (Therapeutic Exercise)    Knee (Therapeutic Exercise) AROM (active range of motion);isometric exercises  -AE     Knee AROM (Therapeutic Exercise) right;LAQ (long arc quad);heel slides;10 repetitions;sitting  -AE     Knee Isometrics (Therapeutic Exercise) bilateral;gluteal sets;quad sets;10 repetitions;3 second hold  -AE       Row Name 05/15/25 0951          Ankle (Therapeutic Exercise)    Ankle (Therapeutic Exercise) AROM (active range of motion)  -AE     Ankle AROM (Therapeutic Exercise) bilateral;dorsiflexion;plantarflexion;10 repetitions  -AE       Row Name 05/15/25 0951          Balance    Balance Assessment sitting static balance;sitting dynamic balance;standing static balance;standing dynamic balance  -AE     Static Sitting Balance contact guard  -AE     Dynamic Sitting Balance contact guard  -AE     Position, Sitting Balance unsupported;sitting edge of bed  -AE     Static Standing Balance minimal assist  -AE     Dynamic Standing Balance minimal assist  -AE     Position/Device Used, Standing Balance  supported;walker, front-wheeled  -AE       Row Name 05/15/25 0951          Sensory Assessment (Somatosensory)    Sensory Assessment (Somatosensory) other (see comments)  R hip numbness  -AE               User Key  (r) = Recorded By, (t) = Taken By, (c) = Cosigned By      Initials Name Provider Type    AE Dean Bautista, PT Physical Therapist                   Goals/Plan    No documentation.                  Clinical Impression       Row Name 05/15/25 0953          Pain    Pretreatment Pain Rating 0/10 - no pain  -AE     Posttreatment Pain Rating 2/10  -AE     Pain Location hip  -AE     Pain Side/Orientation right  -AE     Pain Management Interventions activity modification encouraged;exercise or physical activity utilized;cold applied  -AE     Response to Pain Interventions activity participation with increased pain  -AE       Row Name 05/15/25 0953          Plan of Care Review    Plan of Care Reviewed With patient  -AE     Progress improving  -AE     Outcome Evaluation Pt continues to present with decreased functional mobility and strength deficits limiting independence. Pt ambulated 35ft with min A and RW for support. Continue to progress per pt tolerance.  -AE       Row Name 05/15/25 0953          Vital Signs    Pre Systolic BP Rehab 99  -AE     Pre Treatment Diastolic BP 58  -AE     Post Systolic BP Rehab 104  -AE     Post Treatment Diastolic BP 43  -AE     Pre SpO2 (%) 98  -AE     O2 Delivery Pre Treatment room air  -AE     O2 Delivery Intra Treatment room air  -AE     Post SpO2 (%) 97  -AE     O2 Delivery Post Treatment room air  -AE     Pre Patient Position Supine  -AE     Intra Patient Position Standing  -AE     Post Patient Position Sitting  -AE       Row Name 05/15/25 0953          Positioning and Restraints    Pre-Treatment Position in bed  -AE     Post Treatment Position chair  -AE     In Chair notified nsg;reclined;call light within reach;encouraged to call for assist;exit alarm on;waffle cushion;legs  elevated;compression device;heels elevated  -AE               User Key  (r) = Recorded By, (t) = Taken By, (c) = Cosigned By      Initials Name Provider Type    AE Dean Bautista PT Physical Therapist                   Outcome Measures       Row Name 05/15/25 0956          How much help from another person do you currently need...    Turning from your back to your side while in flat bed without using bedrails? 3  -AE     Moving from lying on back to sitting on the side of a flat bed without bedrails? 3  -AE     Moving to and from a bed to a chair (including a wheelchair)? 3  -AE     Standing up from a chair using your arms (e.g., wheelchair, bedside chair)? 3  -AE     Climbing 3-5 steps with a railing? 2  -AE     To walk in hospital room? 3  -AE     AM-PAC 6 Clicks Score (PT) 17  -AE     Highest Level of Mobility Goal Stand (1 or More Minutes)-5  -AE       Row Name 05/15/25 0956          Functional Assessment    Outcome Measure Options AM-PAC 6 Clicks Basic Mobility (PT)  -AE               User Key  (r) = Recorded By, (t) = Taken By, (c) = Cosigned By      Initials Name Provider Type    Dean Huggins PT Physical Therapist                                 Physical Therapy Education       Title: PT OT SLP Therapies (Done)       Topic: Physical Therapy (Done)       Point: Mobility training (Done)       Learning Progress Summary            Patient Acceptance, E, VU by AE at 5/15/2025 0812    Davider, RONNA,D,H,TB, VU by SC at 5/14/2025 1819    Comment: reviewed HEP                      Point: Home exercise program (Done)       Learning Progress Summary            Patient Acceptance, E, VU by AE at 5/15/2025 0812    Eager, E,D,H,TB, VU by SC at 5/14/2025 1819    Comment: reviewed HEP                      Point: Body mechanics (Done)       Learning Progress Summary            Patient Acceptance, E, VU by AE at 5/15/2025 0812    Eager, E,D,H,TB, VU by SC at 5/14/2025 1819    Comment: reviewed HEP                       Point: Precautions (Done)       Learning Progress Summary            Patient Acceptance, E, VU by AE at 5/15/2025 0812    Davider, RONNA,D,H,TB, VU by SC at 5/14/2025 1819    Comment: reviewed HEP                                      User Key       Initials Effective Dates Name Provider Type Discipline    SC 02/03/23 -  Yanni Clark, PT Physical Therapist PT    AE 09/21/21 -  Dean Bautista PT Physical Therapist PT                  PT Recommendation and Plan     Progress: improving  Outcome Evaluation: Pt continues to present with decreased functional mobility and strength deficits limiting independence. Pt ambulated 35ft with min A and RW for support. Continue to progress per pt tolerance.     Time Calculation:         PT Charges       Row Name 05/15/25 0956             Time Calculation    Start Time 0812  -AE      PT Received On 05/15/25  -AE      PT Goal Re-Cert Due Date 05/24/25  -AE         Timed Charges    05612 - PT Therapeutic Exercise Minutes 12  -AE      76280 - PT Therapeutic Activity Minutes 17  -AE         Total Minutes    Timed Charges Total Minutes 29  -AE       Total Minutes 29  -AE                User Key  (r) = Recorded By, (t) = Taken By, (c) = Cosigned By      Initials Name Provider Type    AE Dean Bautista PT Physical Therapist                  Therapy Charges for Today       Code Description Service Date Service Provider Modifiers Qty    51207098654 HC PT THER PROC EA 15 MIN 5/15/2025 Dean Bautista, PT GP 1    45959386636 HC PT THERAPEUTIC ACT EA 15 MIN 5/15/2025 Dean Bautista, PT GP 1    28641887524 HC PT THER SUPP EA 15 MIN 5/15/2025 Dean Bautista PT GP 2            PT G-Codes  Outcome Measure Options: AM-PAC 6 Clicks Basic Mobility (PT)  AM-PAC 6 Clicks Score (PT): 17  PT Discharge Summary  Anticipated Discharge Disposition (PT): inpatient rehabilitation facility    Dean Bautista PT  5/15/2025

## 2025-05-15 NOTE — PLAN OF CARE
Goal Outcome Evaluation:      A&Ox4 and VSS on RA. Minimal complaints of pain. Optifoam surgical dressing C/D/I and ice applied to incision area. Family at bedside. Up to BSC and up in chair. Voided spontaneously. PRN administered for nausea and effective per pt. SCDs on. IVFs infusing. IS education provided and use encouraged. Call light in reach

## 2025-05-15 NOTE — THERAPY TREATMENT NOTE
Patient Name: Khushbu Bledsoe  : 3/17/1934    MRN: [id number]                              Today's Date: 5/15/2025       Admit Date: 2025    Visit Dx: No diagnosis found.  Patient Active Problem List   Diagnosis    AF (paroxysmal atrial fibrillation)    Acute on chronic diastolic CHF (congestive heart failure)    Essential hypertension    Nonrheumatic mitral valve regurgitation    Lumbar radiculopathy    Age-related physical debility    Loss of balance    Lumbar canal stenosis    Difficulty breathing    CKD (chronic kidney disease) stage 4, GFR 15-29 ml/min    Arthritis of right hip    Hyperglycemia    Status post total replacement of right hip    CHF (congestive heart failure)    Elevated hemoglobin A1c     Past Medical History:   Diagnosis Date    AF (paroxysmal atrial fibrillation) 09/10/2024    Arthritis 2010    Cervical disc disorder 2010    CHF (congestive heart failure)     CKD (chronic kidney disease) stage 4, GFR 15-29 ml/min 2025    Congenital heart disease     Coronary artery disease     History of shingles 2024    Hypertension     Low back pain 2010    Lumbosacral disc disease 2010    Thoracic disc disorder      Past Surgical History:   Procedure Laterality Date    BACK SURGERY  2010    CATARACT EXTRACTION Bilateral     COLONOSCOPY      JOINT REPLACEMENT  2025    RETINAL DETACHMENT REPAIR Left     SPINAL FUSION  2010    TOTAL HIP ARTHROPLASTY Right 2025    Procedure: TOTAL HIP ARTHROPLASTY ANTERIOR RIGHT;  Surgeon: Julian Mcgee MD;  Location: [facility];  Service: Orthopedics;  Laterality: Right;      General Information       Row Name 05/15/25 1551 05/15/25 0940       Physical Therapy Time and Intention    Document Type therapy note (daily note)  -AE therapy note (daily note)  -AE    Mode of Treatment physical therapy  -AE physical therapy  -AE      Row Name 05/15/25 1551 05/15/25 0940       General Information    Patient Profile Reviewed yes  -AE yes  -AE    Existing  Precautions/Restrictions fall;hip, anterior;other (see comments)  WBAT RLE  -AE fall;hip;hip, anterior;right  -AE    Barriers to Rehab previous functional deficit  -AE previous functional deficit  -AE      Row Name 05/15/25 1551          Living Environment    Current Living Arrangements home  -AE       Row Name 05/15/25 1551          Cognition    Orientation Status (Cognition) oriented x 3  -AE       Row Name 05/15/25 1551 05/15/25 0940       Safety Issues/Impairments Affecting Functional Mobility    Safety Issues Affecting Function (Mobility) awareness of need for assistance;insight into deficits/self-awareness;safety precaution awareness;safety precautions follow-through/compliance;sequencing abilities  -AE insight into deficits/self-awareness;safety precaution awareness;sequencing abilities  -AE    Impairments Affecting Function (Mobility) pain;strength;range of motion (ROM);balance;endurance/activity tolerance  -AE pain;strength;range of motion (ROM);motor control;balance  -AE              User Key  (r) = Recorded By, (t) = Taken By, (c) = Cosigned By      Initials Name Provider Type    AE Dean Bautista, PT Physical Therapist                   Mobility       Row Name 05/15/25 1552 05/15/25 0941       Bed Mobility    Bed Mobility supine-sit  -AE supine-sit  -AE    Supine-Sit [place] (Bed Mobility) minimum assist (75% patient effort);1 person assist;verbal cues  -AE moderate assist (50% patient effort);1 person assist;verbal cues  -AE    Assistive Device (Bed Mobility) head of bed elevated;bed rails;repositioning sheet  -AE head of bed elevated  -AE    Comment, (Bed Mobility) VCs for hand placement and sequencing. Pt required increased cues to improve sequencing of BLE to EOB. Pt required increased assist for scooting to EOB to place feet on floor.  -AE VCs for hand placement and sequencing. Pt required increased assist to advance BLE to EOB.  -AE      Row Name 05/15/25 1552 05/15/25 0941       Transfers     Comment, (Transfers) VCs for hand placement and sequencing. Pt required increased cues to improve upright posture and weightbearing onto RLE in standing.  -AE VCs for hand placement and sequencing. Pt required increased cues to push up from seated surface and not pulling up from RW.  -AE      Row Name 05/15/25 1552 05/15/25 0941       Sit-Stand Transfer    Sit-Stand [place] (Transfers) minimum assist (75% patient effort);verbal cues  -AE minimum assist (75% patient effort);1 person assist;verbal cues  -AE    Assistive Device (Sit-Stand Transfers) walker, front-wheeled  -AE walker, front-wheeled  -AE      Row Name 05/15/25 1552 05/15/25 0941       Gait/Stairs (Locomotion)    [place] Level (Gait) minimum assist (75% patient effort);1 person assist;verbal cues  -AE minimum assist (75% patient effort);1 person assist;verbal cues  -AE    Assistive Device (Gait) walker, front-wheeled  -AE walker, front-wheeled  -AE    Distance in Feet (Gait) 100  -AE 35  -AE    Deviations/Abnormal Patterns (Gait) right sided deviations;gait speed decreased;stride length decreased;weight shifting decreased;antalgic  -AE right sided deviations;gait speed decreased;stride length decreased;weight shifting decreased;antalgic  -AE    Bilateral Gait Deviations forward flexed posture  -AE forward flexed posture  -AE    Comment, (Gait/Stairs) Pt demo step through gait pattern with slowed kasia and decreased gait speed. Pt continues to ambulate with antalgic gait pattern and required increased cues/education for proper heel-toe gait pattern. Pt able to improve gait pattern during session but required cues throughout to focus on steps. Further distance limited by fatigue.  -AE Pt demo step through gait pattern with slowed kasia and decreased step length. No c/o dizziness this session. Pt reported increased R hip pain with increased ambulation distance. Further distance limited by hip pain and weakness.  -AE      Row Name 05/15/25  1552 05/15/25 0941       Mobility    Extremity Weight-bearing Status right lower extremity  -AE right lower extremity  -AE    Right Lower Extremity (Weight-bearing Status) weight-bearing as tolerated (WBAT)  -AE weight-bearing as tolerated (WBAT)  -AE              User Key  (r) = Recorded By, (t) = Taken By, (c) = Cosigned By      Initials Name Provider Type    AE Dean Bautista PT Physical Therapist                   Obj/Interventions       Row Name 05/15/25 1559 05/15/25 0951       Motor Skills    Therapeutic Exercise hip;knee;ankle  -AE hip;knee;ankle  -AE      Row Name 05/15/25 0951          Hip (Therapeutic Exercise)    Hip (Therapeutic Exercise) AROM (active range of motion)  -AE     Hip AROM (Therapeutic Exercise) right;flexion;extension;aBduction;aDduction;sitting;10 repetitions  -AE       Row Name 05/15/25 1559 05/15/25 0951       Knee (Therapeutic Exercise)    Knee (Therapeutic Exercise) AROM (active range of motion)  -AE AROM (active range of motion);isometric exercises  -AE    Knee AROM (Therapeutic Exercise) right;LAQ (long arc quad);10 repetitions;sitting  -AE right;LAQ (long arc quad);heel slides;10 repetitions;sitting  -AE    Knee Isometrics (Therapeutic Exercise) -- bilateral;gluteal sets;quad sets;10 repetitions;3 second hold  -AE      Row Name 05/15/25 1559 05/15/25 0951       Ankle (Therapeutic Exercise)    Ankle (Therapeutic Exercise) AROM (active range of motion)  -AE AROM (active range of motion)  -AE    Ankle AROM (Therapeutic Exercise) bilateral;dorsiflexion;15 repititions;sitting  -AE bilateral;dorsiflexion;plantarflexion;10 repetitions  -AE      Row Name 05/15/25 1559 05/15/25 0951       Balance    Balance Assessment sitting static balance;sitting dynamic balance;standing static balance;standing dynamic balance  -AE sitting static balance;sitting dynamic balance;standing static balance;standing dynamic balance  -AE    Static Sitting Balance standby assist  -AE contact guard  -AE     Dynamic Sitting Balance contact guard  -AE contact guard  -AE    Position, Sitting Balance unsupported;sitting edge of bed  -AE unsupported;sitting edge of bed  -AE    Static Standing Balance contact guard  -AE minimal assist  -AE    Dynamic Standing Balance minimal assist  -AE minimal assist  -AE    Position/Device Used, Standing Balance supported;walker, front-wheeled  -AE supported;walker, front-wheeled  -AE      Row Name 05/15/25 0951          Sensory Assessment (Somatosensory)    Sensory Assessment (Somatosensory) other (see comments)  R hip numbness  -AE               User Key  (r) = Recorded By, (t) = Taken By, (c) = Cosigned By      Initials Name Provider Type    AE Dean Bautista, PT Physical Therapist                   Goals/Plan    No documentation.                  Clinical Impression       Row Name 05/15/25 1600 05/15/25 0953       Pain    Pretreatment Pain Rating -- 0/10 - no pain  -AE    Posttreatment Pain Rating -- 2/10  -AE    Pain Location hip  -AE hip  -AE    Pain Side/Orientation right  -AE right  -AE    Pain Management Interventions activity modification encouraged;exercise or physical activity utilized;positioning techniques utilized  -AE activity modification encouraged;exercise or physical activity utilized;cold applied  -AE    Response to Pain Interventions activity level improved;activity participation with tolerable pain  -AE activity participation with increased pain  -AE    Additional Documentation Pain Scale: FACES Pre/Post-Treatment (Group)  -AE --      Row Name 05/15/25 1600          Pain Scale: FACES Pre/Post-Treatment    Pain: FACES Scale, Pretreatment 2-->hurts little bit  -AE     Posttreatment Pain Rating 2-->hurts little bit  -AE       Row Name 05/15/25 1600 05/15/25 0953       Plan of Care Review    Plan of Care Reviewed With patient  -AE patient  -AE    Progress improving  -AE improving  -AE    Outcome Evaluation Pt continues to present with decreased functional mobility and  decreased activity tolerance. Pt ambulated 100ft with min A and RW for support. Continue to progress per pt tolerance.  -AE Pt continues to present with decreased functional mobility and strength deficits limiting independence. Pt ambulated 35ft with min A and RW for support. Continue to progress per pt tolerance.  -AE      Row Name 05/15/25 1600 05/15/25 0953       Vital Signs    Pre Systolic BP Rehab 123  -AE 99  -AE    Pre Treatment Diastolic BP 53  -AE 58  -AE    Post Systolic BP Rehab 116  -  -AE    Post Treatment Diastolic BP 51  -AE 43  -AE    Pre SpO2 (%) 97  -AE 98  -AE    O2 Delivery Pre Treatment room air  -AE room air  -AE    O2 Delivery Intra Treatment room air  -AE room air  -AE    Post SpO2 (%) -- 97  -AE    O2 Delivery Post Treatment room air  -AE room air  -AE    Pre Patient Position Supine  -AE Supine  -AE    Intra Patient Position Standing  -AE Standing  -AE    Post Patient Position Sitting  -AE Sitting  -AE      Row Name 05/15/25 1600 05/15/25 0953       Positioning and Restraints    Pre-Treatment Position in bed  -AE in bed  -AE    Post Treatment Position bed  -AE chair  -AE    In Bed notified nsg;sitting EOB;call light within reach;with OT  -AE --    In Chair -- notified nsg;reclined;call light within reach;encouraged to call for assist;exit alarm on;waffle cushion;legs elevated;compression device;heels elevated  -AE              User Key  (r) = Recorded By, (t) = Taken By, (c) = Cosigned By      Initials Name Provider Type    AE Dean Bautista, PT Physical Therapist                   Outcome Measures       Row Name 05/15/25 1602 05/15/25 0956       How much help from another person do you currently need...    Turning from your back to your side while in flat bed without using bedrails? 3  -AE 3  -AE    Moving from lying on back to sitting on the side of a flat bed without bedrails? 3  -AE 3  -AE    Moving to and from a bed to a chair (including a wheelchair)? 3  -AE 3  -AE    Standing  up from a chair using your arms (e.g., wheelchair, bedside chair)? 3  -AE 3  -AE    Climbing 3-5 steps with a railing? 2  -AE 2  -AE    To walk in hospital room? 3  -AE 3  -AE    AM-PAC 6 Clicks Score (PT) 17  -AE 17  -AE    Highest Level of Mobility Goal Stand (1 or More Minutes)-5  -AE Stand (1 or More Minutes)-5  -AE      Row Name 05/15/25 0830          How much help from another person do you currently need...    Turning from your back to your side while in flat bed without using bedrails? 3  -OD     Moving from lying on back to sitting on the side of a flat bed without bedrails? 3  -OD     Moving to and from a bed to a chair (including a wheelchair)? 3  -OD     Standing up from a chair using your arms (e.g., wheelchair, bedside chair)? 3  -OD     Climbing 3-5 steps with a railing? 2  -OD     To walk in hospital room? 3  -OD     AM-PAC 6 Clicks Score (PT) 17  -OD     Highest Level of Mobility Goal Stand (1 or More Minutes)-5  -OD       Row Name 05/15/25 1602 05/15/25 1524       Functional Assessment    Outcome Measure Options AM-PAC 6 Clicks Basic Mobility (PT)  -AE AM-PAC 6 Clicks Daily Activity (OT)  -JY      Row Name 05/15/25 0956          Functional Assessment    Outcome Measure Options AM-PAC 6 Clicks Basic Mobility (PT)  -AE               User Key  (r) = Recorded By, (t) = Taken By, (c) = Cosigned By      Initials Name Provider Type    OD Lucille Andrade, RN Registered Nurse    Joi Rebollar OT Occupational Therapist    Dean Huggins PT Physical Therapist                                 Physical Therapy Education       Title: PT OT SLP Therapies (In Progress)       Topic: Physical Therapy (Done)       Point: Mobility training (Done)       Learning Progress Summary            Patient Acceptance, E, VU by AE at 5/15/2025 1301    Acceptance, E, VU by AE at 5/15/2025 0812    RONNA Pope,D,H,TB, VU by SC at 5/14/2025 1819    Comment: reviewed HEP                      Point: Home exercise program  (Done)       Learning Progress Summary            Patient Acceptance, E, VU by AE at 5/15/2025 1301    Acceptance, E, VU by AE at 5/15/2025 0812    Eager, E,D,H,TB, VU by SC at 5/14/2025 1819    Comment: reviewed HEP                      Point: Body mechanics (Done)       Learning Progress Summary            Patient Acceptance, E, VU by AE at 5/15/2025 1301    Acceptance, E, VU by AE at 5/15/2025 0812    Eager, E,D,H,TB, VU by SC at 5/14/2025 1819    Comment: reviewed HEP                      Point: Precautions (Done)       Learning Progress Summary            Patient Acceptance, E, VU by AE at 5/15/2025 1301    Acceptance, E, VU by AE at 5/15/2025 0812    Eager, E,D,H,TB, VU by SC at 5/14/2025 1819    Comment: reviewed HEP                                      User Key       Initials Effective Dates Name Provider Type Discipline    SC 02/03/23 -  Yanni Clark PT Physical Therapist PT     09/21/21 -  Dean Bautista PT Physical Therapist PT                  PT Recommendation and Plan     Progress: improving  Outcome Evaluation: Pt continues to present with decreased functional mobility and decreased activity tolerance. Pt ambulated 100ft with min A and RW for support. Continue to progress per pt tolerance.     Time Calculation:         PT Charges       Row Name 05/15/25 1603 05/15/25 0956          Time Calculation    Start Time 1301  -AE 0812  -AE     PT Received On 05/15/25  -AE 05/15/25  -AE     PT Goal Re-Cert Due Date 05/24/25  -AE 05/24/25  -AE        Timed Charges    84456 - PT Therapeutic Exercise Minutes 5  -AE 12  -AE     92106 - PT Therapeutic Activity Minutes 10  -AE 17  -AE        Total Minutes    Timed Charges Total Minutes 15  -AE 29  -AE      Total Minutes 15  -AE 29  -AE               User Key  (r) = Recorded By, (t) = Taken By, (c) = Cosigned By      Initials Name Provider Type    AE Dean Bautista PT Physical Therapist                  Therapy Charges for Today       Code Description  Service Date Service Provider Modifiers Qty    21368362119 HC PT THER PROC EA 15 MIN 5/15/2025 Dean Bautista, PT GP 1    44456477322 HC PT THERAPEUTIC ACT EA 15 MIN 5/15/2025 Dean Bautista, PT GP 1    71336875802 HC PT THER SUPP EA 15 MIN 5/15/2025 Dean Bautista, PT GP 2    84117478511 HC PT THERAPEUTIC ACT EA 15 MIN 5/15/2025 Dean Bautista, PT GP 1            PT G-Codes  Outcome Measure Options: AM-PAC 6 Clicks Basic Mobility (PT)  AM-PAC 6 Clicks Score (PT): 17  AM-PAC 6 Clicks Score (OT): 17  PT Discharge Summary  Anticipated Discharge Disposition (PT): inpatient rehabilitation facility    Dean Bautista PT  5/15/2025

## 2025-05-16 LAB
ABO GROUP BLD: NORMAL
ABO GROUP BLD: NORMAL
ANION GAP SERPL CALCULATED.3IONS-SCNC: 8 MMOL/L (ref 5–15)
BLD GP AB SCN SERPL QL: NEGATIVE
BUN SERPL-MCNC: 46 MG/DL (ref 8–23)
BUN/CREAT SERPL: 32.6 (ref 7–25)
CALCIUM SPEC-SCNC: 8.2 MG/DL (ref 8.2–9.6)
CHLORIDE SERPL-SCNC: 108 MMOL/L (ref 98–107)
CO2 SERPL-SCNC: 23 MMOL/L (ref 22–29)
CREAT SERPL-MCNC: 1.41 MG/DL (ref 0.57–1)
DEPRECATED RDW RBC AUTO: 47.6 FL (ref 37–54)
EGFRCR SERPLBLD CKD-EPI 2021: 35.3 ML/MIN/1.73
ERYTHROCYTE [DISTWIDTH] IN BLOOD BY AUTOMATED COUNT: 14.6 % (ref 12.3–15.4)
GLUCOSE SERPL-MCNC: 119 MG/DL (ref 65–99)
HCT VFR BLD AUTO: 20.5 % (ref 34–46.6)
HCT VFR BLD AUTO: 30 % (ref 34–46.6)
HGB BLD-MCNC: 6.3 G/DL (ref 12–15.9)
HGB BLD-MCNC: 9.8 G/DL (ref 12–15.9)
MCH RBC QN AUTO: 28.8 PG (ref 26.6–33)
MCHC RBC AUTO-ENTMCNC: 32.7 G/DL (ref 31.5–35.7)
MCV RBC AUTO: 88.2 FL (ref 79–97)
PLATELET # BLD AUTO: 193 10*3/MM3 (ref 140–450)
PMV BLD AUTO: 9.2 FL (ref 6–12)
POTASSIUM SERPL-SCNC: 4.6 MMOL/L (ref 3.5–5.2)
RBC # BLD AUTO: 3.4 10*6/MM3 (ref 3.77–5.28)
RH BLD: POSITIVE
RH BLD: POSITIVE
SODIUM SERPL-SCNC: 139 MMOL/L (ref 136–145)
T&S EXPIRATION DATE: NORMAL
WBC NRBC COR # BLD AUTO: 7.51 10*3/MM3 (ref 3.4–10.8)

## 2025-05-16 PROCEDURE — A9270 NON-COVERED ITEM OR SERVICE: HCPCS | Performed by: INTERNAL MEDICINE

## 2025-05-16 PROCEDURE — 63710000001 LISINOPRIL 40 MG TABLET: Performed by: INTERNAL MEDICINE

## 2025-05-16 PROCEDURE — 86900 BLOOD TYPING SEROLOGIC ABO: CPT | Performed by: INTERNAL MEDICINE

## 2025-05-16 PROCEDURE — 97535 SELF CARE MNGMENT TRAINING: CPT

## 2025-05-16 PROCEDURE — 86900 BLOOD TYPING SEROLOGIC ABO: CPT

## 2025-05-16 PROCEDURE — P9016 RBC LEUKOCYTES REDUCED: HCPCS

## 2025-05-16 PROCEDURE — 85014 HEMATOCRIT: CPT | Performed by: ORTHOPAEDIC SURGERY

## 2025-05-16 PROCEDURE — 86901 BLOOD TYPING SEROLOGIC RH(D): CPT

## 2025-05-16 PROCEDURE — 97116 GAIT TRAINING THERAPY: CPT

## 2025-05-16 PROCEDURE — 63710000001 ACETAMINOPHEN EXTRA STRENGTH 500 MG TABLET: Performed by: ORTHOPAEDIC SURGERY

## 2025-05-16 PROCEDURE — 86901 BLOOD TYPING SEROLOGIC RH(D): CPT | Performed by: INTERNAL MEDICINE

## 2025-05-16 PROCEDURE — 86850 RBC ANTIBODY SCREEN: CPT | Performed by: INTERNAL MEDICINE

## 2025-05-16 PROCEDURE — 63710000001 CARVEDILOL 6.25 MG TABLET: Performed by: INTERNAL MEDICINE

## 2025-05-16 PROCEDURE — 63710000001 HYDRALAZINE 50 MG TABLET: Performed by: INTERNAL MEDICINE

## 2025-05-16 PROCEDURE — A9270 NON-COVERED ITEM OR SERVICE: HCPCS | Performed by: NURSE PRACTITIONER

## 2025-05-16 PROCEDURE — 97110 THERAPEUTIC EXERCISES: CPT

## 2025-05-16 PROCEDURE — 80048 BASIC METABOLIC PNL TOTAL CA: CPT | Performed by: ORTHOPAEDIC SURGERY

## 2025-05-16 PROCEDURE — 63710000001 NIFEDIPINE XL 30 MG TABLET SUSTAINED-RELEASE 24 HOUR: Performed by: INTERNAL MEDICINE

## 2025-05-16 PROCEDURE — 86923 COMPATIBILITY TEST ELECTRIC: CPT

## 2025-05-16 PROCEDURE — 36430 TRANSFUSION BLD/BLD COMPNT: CPT

## 2025-05-16 PROCEDURE — 85027 COMPLETE CBC AUTOMATED: CPT | Performed by: INTERNAL MEDICINE

## 2025-05-16 PROCEDURE — 63710000001 SENNOSIDES-DOCUSATE 8.6-50 MG TABLET: Performed by: INTERNAL MEDICINE

## 2025-05-16 PROCEDURE — 94799 UNLISTED PULMONARY SVC/PX: CPT

## 2025-05-16 PROCEDURE — 63710000001 GABAPENTIN 100 MG CAPSULE: Performed by: INTERNAL MEDICINE

## 2025-05-16 PROCEDURE — 63710000001 APIXABAN 2.5 MG TABLET: Performed by: NURSE PRACTITIONER

## 2025-05-16 PROCEDURE — A9270 NON-COVERED ITEM OR SERVICE: HCPCS | Performed by: ORTHOPAEDIC SURGERY

## 2025-05-16 PROCEDURE — 94664 DEMO&/EVAL PT USE INHALER: CPT

## 2025-05-16 PROCEDURE — 85018 HEMOGLOBIN: CPT | Performed by: ORTHOPAEDIC SURGERY

## 2025-05-16 RX ADMIN — HYDRALAZINE HYDROCHLORIDE 50 MG: 50 TABLET ORAL at 16:41

## 2025-05-16 RX ADMIN — GABAPENTIN 100 MG: 100 CAPSULE ORAL at 08:27

## 2025-05-16 RX ADMIN — ACETAMINOPHEN 1000 MG: 500 TABLET ORAL at 06:00

## 2025-05-16 RX ADMIN — GABAPENTIN 100 MG: 100 CAPSULE ORAL at 20:55

## 2025-05-16 RX ADMIN — LISINOPRIL 40 MG: 40 TABLET ORAL at 06:02

## 2025-05-16 RX ADMIN — NIFEDIPINE 30 MG: 30 TABLET, FILM COATED, EXTENDED RELEASE ORAL at 08:28

## 2025-05-16 RX ADMIN — APIXABAN 2.5 MG: 2.5 TABLET, FILM COATED ORAL at 08:27

## 2025-05-16 RX ADMIN — APIXABAN 2.5 MG: 2.5 TABLET, FILM COATED ORAL at 20:54

## 2025-05-16 RX ADMIN — IPRATROPIUM BROMIDE 0.5 MG: 0.5 SOLUTION RESPIRATORY (INHALATION) at 20:37

## 2025-05-16 RX ADMIN — HYDRALAZINE HYDROCHLORIDE 50 MG: 50 TABLET ORAL at 11:59

## 2025-05-16 RX ADMIN — GABAPENTIN 100 MG: 100 CAPSULE ORAL at 16:41

## 2025-05-16 RX ADMIN — ACETAMINOPHEN 1000 MG: 500 TABLET ORAL at 20:54

## 2025-05-16 RX ADMIN — CARVEDILOL 6.25 MG: 6.25 TABLET, FILM COATED ORAL at 18:25

## 2025-05-16 RX ADMIN — IPRATROPIUM BROMIDE 0.5 MG: 0.5 SOLUTION RESPIRATORY (INHALATION) at 07:03

## 2025-05-16 RX ADMIN — HYDRALAZINE HYDROCHLORIDE 50 MG: 50 TABLET ORAL at 20:55

## 2025-05-16 RX ADMIN — CARVEDILOL 6.25 MG: 6.25 TABLET, FILM COATED ORAL at 08:26

## 2025-05-16 RX ADMIN — DOCUSATE SODIUM 50 MG AND SENNOSIDES 8.6 MG 2 TABLET: 8.6; 5 TABLET, FILM COATED ORAL at 12:03

## 2025-05-16 RX ADMIN — DOCUSATE SODIUM 50 MG AND SENNOSIDES 8.6 MG 2 TABLET: 8.6; 5 TABLET, FILM COATED ORAL at 20:55

## 2025-05-16 NOTE — PLAN OF CARE
Goal Outcome Evaluation:  Plan of Care Reviewed With: patient        Progress: no change  Outcome Evaluation: Pt ambulated 40 ft in PM session w/ FWW, [place]. Mobility limited by pain and fatigue. BP stable throughout session. Pt would continue to benefit from IPPT services while hospitalized to address deficits. Recommend IRF at d/c.    Anticipated Discharge Disposition (PT): inpatient rehabilitation facility

## 2025-05-16 NOTE — PLAN OF CARE
Goal Outcome Evaluation:  Plan of Care Reviewed With: patient        Progress: improving  Outcome Evaluation: Pt continues to present below baseline function d/t deficits in RLE strength, balance, and activity tolerance. Pt ambulated 110 ft w/ FWW, [place]. No LOB or knee buckling noted. Mobility limited by pain and fatigue this date. Pt would continue to benefit from IP PT services while hospitalized. Recommend IRF at d/c for best functional outcome.    Anticipated Discharge Disposition (PT): inpatient rehabilitation facility

## 2025-05-16 NOTE — THERAPY TREATMENT NOTE
Patient Name: Khushbu Bledsoe  : 3/17/1934    MRN: [id number]                              Today's Date: 2025       Admit Date: 2025    Visit Dx: No diagnosis found.  Patient Active Problem List   Diagnosis    AF (paroxysmal atrial fibrillation)    Acute on chronic diastolic CHF (congestive heart failure)    Essential hypertension    Nonrheumatic mitral valve regurgitation    Lumbar radiculopathy    Age-related physical debility    Loss of balance    Lumbar canal stenosis    Difficulty breathing    CKD (chronic kidney disease) stage 4, GFR 15-29 ml/min    Arthritis of right hip    Hyperglycemia    Status post total replacement of right hip    CHF (congestive heart failure)    Elevated hemoglobin A1c     Past Medical History:   Diagnosis Date    AF (paroxysmal atrial fibrillation) 09/10/2024    Arthritis 2010    Cervical disc disorder 2010    CHF (congestive heart failure)     CKD (chronic kidney disease) stage 4, GFR 15-29 ml/min 2025    Congenital heart disease     Coronary artery disease     History of shingles 2024    Hypertension     Low back pain 2010    Lumbosacral disc disease 2010    Thoracic disc disorder      Past Surgical History:   Procedure Laterality Date    BACK SURGERY  2010    CATARACT EXTRACTION Bilateral     COLONOSCOPY      JOINT REPLACEMENT  2025    RETINAL DETACHMENT REPAIR Left     SPINAL FUSION  2010    TOTAL HIP ARTHROPLASTY Right 2025    Procedure: TOTAL HIP ARTHROPLASTY ANTERIOR RIGHT;  Surgeon: Julian Mcgee MD;  Location: [facility];  Service: Orthopedics;  Laterality: Right;      General Information       Row Name 25 1114          Physical Therapy Time and Intention    Document Type therapy note (daily note)  -     Mode of Treatment physical therapy  -       Row Name 25 1114          General Information    Patient Profile Reviewed yes  -     Existing Precautions/Restrictions fall;hip, anterior;other (see comments)  RLE WBAT, low  H&H  -     Barriers to Rehab previous functional deficit  -       Row Name 05/16/25 1114          Cognition    Orientation Status (Cognition) oriented x 3  -[facility] Name 05/16/25 1114          Safety Issues/Impairments Affecting Functional Mobility    Safety Issues Affecting Function (Mobility) awareness of need for assistance;insight into deficits/self-awareness;safety precaution awareness;safety precautions follow-through/compliance  -     Impairments Affecting Function (Mobility) pain;strength;range of motion (ROM);balance;endurance/activity tolerance  -               User Key  (r) = Recorded By, (t) = Taken By, (c) = Cosigned By      Initials Name Provider Type     Pamella Hui, PT Physical Therapist                   Mobility       [facility] Name 05/16/25 1115          Bed Mobility    Comment, (Bed Mobility) UIC pre/post tx  -[facility] Name 05/16/25 1115          Transfers    Comment, (Transfers) VCs for hand placement and sequencing w/ FWW. Cues to step out RLE prior to transfers for comfort. Pt denies prolonged dizziness w/ mobility  -[facility] Name 05/16/25 1115          Sit-Stand Transfer    Sit-Stand [place] (Transfers) contact guard;1 person assist;verbal cues  -     Assistive Device (Sit-Stand Transfers) walker, front-wheeled  -     Comment, (Sit-Stand Transfer) STS from chair and toilet  -[facility] Name 05/16/25 1115          Gait/Stairs (Locomotion)    [place] Level (Gait) minimum assist (75% patient effort);1 person assist;verbal cues  -     Assistive Device (Gait) walker, front-wheeled  -     Patient was able to Ambulate yes  -     Distance in Feet (Gait) 110  -     Deviations/Abnormal Patterns (Gait) right sided deviations;gait speed decreased;stride length decreased;weight shifting decreased;antalgic  -     Bilateral Gait Deviations forward flexed posture  -     Right Sided Gait Deviations weight shift ability decreased  -     Comment, (Gait/Stairs)  Pt demo step through gait pattern at decreased speed w/ decreased step length. VCs for upright posture, increased heel strike, increased step length, and increased weight acceptance through RLE. No LOB or knee buckling. Distance limited by pain and fatigue. Denies dizziness throughout  -[facility] Name 05/16/25 1115          Mobility    Extremity Weight-bearing Status right lower extremity  -     Right Lower Extremity (Weight-bearing Status) weight-bearing as tolerated (WBAT)  -               User Key  (r) = Recorded By, (t) = Taken By, (c) = Cosigned By      Initials Name Provider Type     Pamella Hui PT Physical Therapist                   Obj/Interventions       [facility] Name 05/16/25 1117          Motor Skills    Therapeutic Exercise hip;knee;ankle  -[facility] Name 05/16/25 1117          Hip (Therapeutic Exercise)    Hip (Therapeutic Exercise) isometric exercises;strengthening exercise  -     Hip Isometrics (Therapeutic Exercise) bilateral;gluteal sets;10 repetitions  -     Hip Strengthening (Therapeutic Exercise) right;aBduction;heel slides;10 repetitions  -[facility] Name 05/16/25 1117          Knee (Therapeutic Exercise)    Knee (Therapeutic Exercise) isometric exercises;strengthening exercise  -     Knee Isometrics (Therapeutic Exercise) bilateral;quad sets;10 repetitions  -     Knee Strengthening (Therapeutic Exercise) right;LAQ (long arc quad);10 repetitions  -[facility] Name 05/16/25 1117          Ankle (Therapeutic Exercise)    Ankle (Therapeutic Exercise) AROM (active range of motion)  -     Ankle AROM (Therapeutic Exercise) bilateral;dorsiflexion;plantarflexion;10 repetitions  -[facility] Name 05/16/25 1117          Balance    Balance Assessment sitting static balance;sitting dynamic balance;standing static balance;standing dynamic balance  -     Static Sitting Balance standby assist  -     Dynamic Sitting Balance contact guard;verbal cues  -     Position, Sitting  Balance unsupported;sitting in chair  -     Static Standing Balance contact guard  -     Dynamic Standing Balance minimal assist;verbal cues  -     Position/Device Used, Standing Balance supported;walker, front-wheeled  -     Balance Interventions sitting;standing;sit to stand;supported;dynamic;static  -               User Key  (r) = Recorded By, (t) = Taken By, (c) = Cosigned By      Initials Name Provider Type     Pamella Hui PT Physical Therapist                   Goals/Plan    No documentation.                  Clinical Impression       [facility] Name 05/16/25 1119          Pain    Pain Location hip  -     Pain Side/Orientation right  -     Pain Management Interventions activity modification encouraged;exercise or physical activity utilized;positioning techniques utilized;other (see comments)  pt declined cold pack  -     Response to Pain Interventions activity participation with tolerable pain  -[facility] Name 05/16/25 1119          Pain Scale: FACES Pre/Post-Treatment    Pain: FACES Scale, Pretreatment 2-->hurts little bit  -     Posttreatment Pain Rating 2-->hurts little bit  -     Pre/Posttreatment Pain Comment reports increased R hip pain to 6/10 during ther ex  -[facility] Name 05/16/25 1119          Plan of Care Review    Plan of Care Reviewed With patient  -     Progress improving  -     Outcome Evaluation Pt continues to present below baseline function d/t deficits in RLE strength, balance, and activity tolerance. Pt ambulated 110 ft w/ FWW, [place]. No LOB or knee buckling noted. Mobility limited by pain and fatigue this date. Pt would continue to benefit from IP PT services while hospitalized. Recommend IRF at d/c for best functional outcome.  -       Row Name 05/16/25 1119          Vital Signs    Pre Systolic BP Rehab 162  -LH     Pre Treatment Diastolic BP 66  -LH     Post Systolic BP Rehab 173  -LH     Post Treatment Diastolic BP 79  -LH     Pre Patient Position Sitting   -     Post Patient Position Sitting  -       Row Name 05/16/25 1119          Positioning and Restraints    Pre-Treatment Position sitting in chair/recliner  -     Post Treatment Position chair  -LH     In Chair notified nsg;reclined;sitting;call light within reach;encouraged to call for assist;exit alarm on;waffle cushion;legs elevated;compression device;heels elevated;with other staff  -               User Key  (r) = Recorded By, (t) = Taken By, (c) = Cosigned By      Initials Name Provider Type     Pamella Hui, PT Physical Therapist                   Outcome Measures       Row Name 05/16/25 1121          How much help from another person do you currently need...    Turning from your back to your side while in flat bed without using bedrails? 3  -LH     Moving from lying on back to sitting on the side of a flat bed without bedrails? 3  -LH     Moving to and from a bed to a chair (including a wheelchair)? 3  -LH     Standing up from a chair using your arms (e.g., wheelchair, bedside chair)? 3  -LH     Climbing 3-5 steps with a railing? 2  -LH     To walk in hospital room? 3  -     AM-PAC 6 Clicks Score (PT) 17  -     Highest Level of Mobility Goal Stand (1 or More Minutes)-  -       Row Name 05/16/25 1121 05/16/25 0838       Functional Assessment    Outcome Measure Options AM-PAC 6 Clicks Basic Mobility (PT)  - AM-PAC 6 Clicks Daily Activity (OT)  -              User Key  (r) = Recorded By, (t) = Taken By, (c) = Cosigned By      Initials Name Provider Type     Pamella Hui, [place] Physical Therapist    Wilma Vizcarra OT Occupational Therapist                                 Physical Therapy Education       Title: PT OT SLP Therapies (In Progress)       Topic: Physical Therapy (Done)       Point: Mobility training (Done)       Learning Progress Summary            Patient Acceptance, E, VU,DU,NR by  at 5/16/2025 1121    Acceptance, E, VU by AE at 5/15/2025 1301    Acceptance, E, VU by  AE at 5/15/2025 0812    Eager, E,D,H,TB, VU by SC at 5/14/2025 1819    Comment: reviewed HEP                      Point: Home exercise program (Done)       Learning Progress Summary            Patient Acceptance, E, VU,DU,NR by  at 5/16/2025 1121    Acceptance, E, VU by AE at 5/15/2025 1301    Acceptance, E, VU by AE at 5/15/2025 0812    Eager, E,D,H,TB, VU by SC at 5/14/2025 1819    Comment: reviewed HEP                      Point: Body mechanics (Done)       Learning Progress Summary            Patient Acceptance, E, VU,DU,NR by  at 5/16/2025 1121    Acceptance, E, VU by AE at 5/15/2025 1301    Acceptance, E, VU by AE at 5/15/2025 0812    Eager, E,D,H,TB, VU by SC at 5/14/2025 1819    Comment: reviewed HEP                      Point: Precautions (Done)       Learning Progress Summary            Patient Acceptance, E, VU,DU,NR by  at 5/16/2025 1121    Acceptance, E, VU by AE at 5/15/2025 1301    Acceptance, E, VU by AE at 5/15/2025 0812    Eager, E,D,H,TB, VU by SC at 5/14/2025 1819    Comment: reviewed HEP                                      User Key       Initials Effective Dates Name Provider Type Discipline    SC 02/03/23 -  Yanni Clark, PT Physical Therapist PT     09/21/21 -  Dean Bautista, PT Physical Therapist PT     09/21/23 -  Pamella Hui, PT Physical Therapist PT                  PT Recommendation and Plan     Progress: improving  Outcome Evaluation: Pt continues to present below baseline function d/t deficits in RLE strength, balance, and activity tolerance. Pt ambulated 110 ft w/ FWW, [place]. No LOB or knee buckling noted. Mobility limited by pain and fatigue this date. Pt would continue to benefit from IP PT services while hospitalized. Recommend IRF at d/c for best functional outcome.     Time Calculation:         PT Charges       Row Name 05/16/25 1121             Time Calculation    Start Time 1042  -      PT Received On 05/16/25  -      PT Goal Re-Cert Due Date 05/24/25   -         Timed Charges    99125 - PT Therapeutic Exercise Minutes 12  -LH      80339 - Gait Training Minutes  12  -LH         Total Minutes    Timed Charges Total Minutes 24  -       Total Minutes 24  -LH                User Key  (r) = Recorded By, (t) = Taken By, (c) = Cosigned By      Initials Name Provider Type     Pamella Hui, PT Physical Therapist                  Therapy Charges for Today       Code Description Service Date Service Provider Modifiers Qty    85388243972 HC PT THER PROC EA 15 MIN 5/16/2025 Pamella Hui, PT GP 1    48345087040 HC GAIT TRAINING EA 15 MIN 5/16/2025 aPmella Hui, PT GP 1            PT G-Codes  Outcome Measure Options: AM-PAC 6 Clicks Basic Mobility (PT)  AM-PAC 6 Clicks Score (PT): 17  AM-PAC 6 Clicks Score (OT): 21  PT Discharge Summary  Anticipated Discharge Disposition (PT): inpatient rehabilitation facility    Pamella Hui PT  5/16/2025

## 2025-05-16 NOTE — THERAPY TREATMENT NOTE
Patient Name: Khushbu Bledsoe  : 3/17/1934    MRN: [id number]                              Today's Date: 2025       Admit Date: 2025    Visit Dx: No diagnosis found.  Patient Active Problem List   Diagnosis    AF (paroxysmal atrial fibrillation)    Acute on chronic diastolic CHF (congestive heart failure)    Essential hypertension    Nonrheumatic mitral valve regurgitation    Lumbar radiculopathy    Age-related physical debility    Loss of balance    Lumbar canal stenosis    Difficulty breathing    CKD (chronic kidney disease) stage 4, GFR 15-29 ml/min    Arthritis of right hip    Hyperglycemia    Status post total replacement of right hip    CHF (congestive heart failure)    Elevated hemoglobin A1c     Past Medical History:   Diagnosis Date    AF (paroxysmal atrial fibrillation) 09/10/2024    Arthritis 2010    Cervical disc disorder 2010    CHF (congestive heart failure)     CKD (chronic kidney disease) stage 4, GFR 15-29 ml/min 2025    Congenital heart disease     Coronary artery disease     History of shingles 2024    Hypertension     Low back pain 2010    Lumbosacral disc disease 2010    Thoracic disc disorder      Past Surgical History:   Procedure Laterality Date    BACK SURGERY  2010    CATARACT EXTRACTION Bilateral     COLONOSCOPY      JOINT REPLACEMENT  2025    RETINAL DETACHMENT REPAIR Left     SPINAL FUSION  2010    TOTAL HIP ARTHROPLASTY Right 2025    Procedure: TOTAL HIP ARTHROPLASTY ANTERIOR RIGHT;  Surgeon: Julian Mcgee MD;  Location: [facility];  Service: Orthopedics;  Laterality: Right;      General Information       Row Name 25 1608 25 1114       Physical Therapy Time and Intention    Document Type therapy note (daily note)  - therapy note (daily note)  -    Mode of Treatment physical therapy  - physical therapy  -      Row Name 25 1608 25 1114       General Information    Patient Profile Reviewed yes  -LH yes  -    Existing  Precautions/Restrictions fall;hip, anterior;other (see comments)  RLE WBAT, low H&H  -LH fall;hip, anterior;other (see comments)  RLE WBAT, low H&H  -LH    Barriers to Rehab previous functional deficit  - previous functional deficit  -      Row Name 05/16/25 1608 05/16/25 1114       Cognition    Orientation Status (Cognition) oriented x 3  -LH oriented x 3  -      Row Name 05/16/25 1608 05/16/25 1114       Safety Issues/Impairments Affecting Functional Mobility    Safety Issues Affecting Function (Mobility) awareness of need for assistance;insight into deficits/self-awareness;safety precaution awareness;safety precautions follow-through/compliance  - awareness of need for assistance;insight into deficits/self-awareness;safety precaution awareness;safety precautions follow-through/compliance  -    Impairments Affecting Function (Mobility) pain;strength;range of motion (ROM);balance;endurance/activity tolerance  - pain;strength;range of motion (ROM);balance;endurance/activity tolerance  -              User Key  (r) = Recorded By, (t) = Taken By, (c) = Cosigned By      Initials Name Provider Type     Pamella Hui, [place] Physical Therapist                   Mobility       Row Name 05/16/25 1608 05/16/25 1115       Bed Mobility    Bed Mobility sit-supine  - --    Sit-Supine [place] (Bed Mobility) moderate assist (50% patient effort);1 person assist;verbal cues  - --    Comment, (Bed Mobility) VCs for hand placement and sequencing. Assist w/ BLEs  -[facility] pre/post tx  -      Row Name 05/16/25 1608 05/16/25 1115       Transfers    Comment, (Transfers) VCs for hand placement and sequencing w/ FWw. Cues to step out RLE  - VCs for hand placement and sequencing w/ FWW. Cues to step out RLE prior to transfers for comfort. Pt denies prolonged dizziness w/ mobility  -      Row Name 05/16/25 1608 05/16/25 1115       Sit-Stand Transfer    Sit-Stand [place] (Transfers) contact guard;1 person  assist;verbal cues  - contact guard;1 person assist;verbal cues  -    Assistive Device (Sit-Stand Transfers) walker, front-wheeled  - walker, front-wheeled  -    Comment, (Sit-Stand Transfer) STS from chair and toilet  - STS from chair and toilet  -      Row Name 05/16/25 1608 05/16/25 1115       Gait/Stairs (Locomotion)    [place] Level (Gait) minimum assist (75% patient effort);1 person assist;verbal cues  - minimum assist (75% patient effort);1 person assist;verbal cues  -    Assistive Device (Gait) walker, front-wheeled  - walker, front-wheeled  -LH    Patient was able to Ambulate yes  -LH yes  -    Distance in Feet (Gait) 40  -  -LH    Deviations/Abnormal Patterns (Gait) right sided deviations;gait speed decreased;stride length decreased;weight shifting decreased;antalgic  - right sided deviations;gait speed decreased;stride length decreased;weight shifting decreased;antalgic  -    Bilateral Gait Deviations forward flexed posture  - forward flexed posture  -    Right Sided Gait Deviations weight shift ability decreased  - weight shift ability decreased  -    Comment, (Gait/Stairs) Pt ambulated from chair>bathroom>bed w/ step through gait pattern at slow speed. VCs for upright posture, increased heel strike, and AD management. No LOB or knee buckling. Distance limited by pain and fatigue  - Pt demo step through gait pattern at decreased speed w/ decreased step length. VCs for upright posture, increased heel strike, increased step length, and increased weight acceptance through RLE. No LOB or knee buckling. Distance limited by pain and fatigue. Denies dizziness throughout  -      Row Name 05/16/25 1608 05/16/25 1115       Mobility    Extremity Weight-bearing Status right lower extremity  -LH right lower extremity  -    Right Lower Extremity (Weight-bearing Status) weight-bearing as tolerated (WBAT)  - weight-bearing as tolerated (WBAT)  -              User Key   (r) = Recorded By, (t) = Taken By, (c) = Cosigned By      Initials Name Provider Type     Pamella Hui PT Physical Therapist                   Obj/Interventions       Row Name 05/16/25 1610 05/16/25 1117       Motor Skills    Therapeutic Exercise hip;knee;ankle  - hip;knee;ankle  -      Row Name 05/16/25 1610 05/16/25 1117       Hip (Therapeutic Exercise)    Hip (Therapeutic Exercise) isometric exercises;strengthening exercise  - isometric exercises;strengthening exercise  -    Hip Isometrics (Therapeutic Exercise) bilateral;gluteal sets;10 repetitions  - bilateral;gluteal sets;10 repetitions  -    Hip Strengthening (Therapeutic Exercise) right;aBduction;heel slides;10 repetitions  - right;aBduction;heel slides;10 repetitions  -[facility] Name 05/16/25 1610 05/16/25 1117       Knee (Therapeutic Exercise)    Knee (Therapeutic Exercise) isometric exercises;strengthening exercise  - isometric exercises;strengthening exercise  -    Knee Isometrics (Therapeutic Exercise) bilateral;quad sets;10 repetitions  - bilateral;quad sets;10 repetitions  -    Knee Strengthening (Therapeutic Exercise) right;LAQ (long arc quad);10 repetitions  - right;LAQ (long arc quad);10 repetitions  -[facility] Name 05/16/25 1610 05/16/25 1117       Ankle (Therapeutic Exercise)    Ankle (Therapeutic Exercise) AROM (active range of motion)  - AROM (active range of motion)  -    Ankle AROM (Therapeutic Exercise) bilateral;dorsiflexion;plantarflexion;10 repetitions  - bilateral;dorsiflexion;plantarflexion;10 repetitions  -[facility] Name 05/16/25 1610 05/16/25 1117       Balance    Balance Assessment sitting static balance;sitting dynamic balance;standing static balance;standing dynamic balance  - sitting static balance;sitting dynamic balance;standing static balance;standing dynamic balance  -    Static Sitting Balance standby assist  - standby assist  -    Dynamic Sitting Balance contact guard  -  contact guard;verbal cues  -    Position, Sitting Balance unsupported;sitting in chair;sitting edge of bed  - unsupported;sitting in chair  -    Static Standing Balance contact guard  - contact guard  -    Dynamic Standing Balance minimal assist;verbal cues  - minimal assist;verbal cues  -    Position/Device Used, Standing Balance supported;walker, front-wheeled  -LH supported;walker, front-wheeled  -    Balance Interventions -- sitting;standing;sit to stand;supported;dynamic;static  -              User Key  (r) = Recorded By, (t) = Taken By, (c) = Cosigned By      Initials Name Provider Type     Pamella Hui, PT Physical Therapist                   Goals/Plan    No documentation.                  Clinical Impression       [facility] Name 05/16/25 1611 05/16/25 1119       Pain    Pretreatment Pain Rating 0/10 - no pain  - --    Posttreatment Pain Rating 0/10 - no pain  - --    Pain Location hip  - hip  -    Pain Side/Orientation right  - right  -    Pain Management Interventions activity modification encouraged;exercise or physical activity utilized;cold applied  - activity modification encouraged;exercise or physical activity utilized;positioning techniques utilized;other (see comments)  pt declined cold pack  -LH    Response to Pain Interventions activity participation with increased pain  - activity participation with tolerable pain  -    Pre/Posttreatment Pain Comment reports increased pain during ambulation  - --      [facility] Name 05/16/25 1119          Pain Scale: FACES Pre/Post-Treatment    Pain: FACES Scale, Pretreatment 2-->hurts little bit  -     Posttreatment Pain Rating 2-->hurts little bit  -     Pre/Posttreatment Pain Comment reports increased R hip pain to 6/10 during ther ex  -       Row Name 05/16/25 1611 05/16/25 1119       Plan of Care Review    Plan of Care Reviewed With patient  - patient  -    Progress no change  - improving  -    Outcome Evaluation  Pt ambulated 40 ft in PM session w/ FWW, [place]. Mobility limited by pain and fatigue. BP stable throughout session. Pt would continue to benefit from IPPT services while hospitalized to address deficits. Recommend IRF at d/c.  - Pt continues to present below baseline function d/t deficits in RLE strength, balance, and activity tolerance. Pt ambulated 110 ft w/ FWW, [place]y. No LOB or knee buckling noted. Mobility limited by pain and fatigue this date. Pt would continue to benefit from IP PT services while hospitalized. Recommend IRF at d/c for best functional outcome.  -      Row Name 05/16/25 1611 05/16/25 1119       Vital Signs    Pre Systolic BP Rehab 156  -  -LH    Pre Treatment Diastolic BP 67  -LH 66  -LH    Post Systolic BP Rehab 167  -  -LH    Post Treatment Diastolic BP 79  -LH 79  -LH    Pre Patient Position Sitting  - Sitting  -    Post Patient Position Supine  - Sitting  -      Row Name 05/16/25 1611 05/16/25 1119       Positioning and Restraints    Pre-Treatment Position sitting in chair/recliner  - sitting in chair/recliner  -    Post Treatment Position bed  - chair  -    In Bed notified nsg;supine;fowlers;call light within reach;encouraged to call for assist;exit alarm on;SCD pump applied;legs elevated;heels elevated  - --    In Chair -- notified nsg;reclined;sitting;call light within reach;encouraged to call for assist;exit alarm on;waffle cushion;legs elevated;compression device;heels elevated;with other staff  -              User Key  (r) = Recorded By, (t) = Taken By, (c) = Cosigned By      Initials Name Provider Type     Pamella Hui PT Physical Therapist                   Outcome Measures       Row Name 05/16/25 1613 05/16/25 1121       How much help from another person do you currently need...    Turning from your back to your side while in flat bed without using bedrails? 3  - 3  -    Moving from lying on back to sitting on the side of a flat bed  without bedrails? 3  -LH 3  -LH    Moving to and from a bed to a chair (including a wheelchair)? 3  -LH 3  -LH    Standing up from a chair using your arms (e.g., wheelchair, bedside chair)? 3  -LH 3  -LH    Climbing 3-5 steps with a railing? 2  -LH 2  -LH    To walk in hospital room? 3  -LH 3  -LH    AM-PAC 6 Clicks Score (PT) 17  -LH 17  -LH    Highest Level of Mobility Goal Stand (1 or More Minutes)-5  -LH Stand (1 or More Minutes)-5  -LH      Row Name 05/16/25 0800          How much help from another person do you currently need...    Turning from your back to your side while in flat bed without using bedrails? 3  -BM     Moving from lying on back to sitting on the side of a flat bed without bedrails? 3  -BM     Moving to and from a bed to a chair (including a wheelchair)? 3  -BM     Standing up from a chair using your arms (e.g., wheelchair, bedside chair)? 3  -BM     Climbing 3-5 steps with a railing? 2  -BM     To walk in hospital room? 3  -BM     AM-PAC 6 Clicks Score (PT) 17  -BM     Highest Level of Mobility Goal Stand (1 or More Minutes)-5  -BM       Row Name 05/16/25 1613 05/16/25 1121       Functional Assessment    Outcome Measure Options AM-PAC 6 Clicks Basic Mobility (PT)  - AM-PAC 6 Clicks Basic Mobility (PT)  -      Row Name 05/16/25 0838          Functional Assessment    Outcome Measure Options AM-PAC 6 Clicks Daily Activity (OT)  -               User Key  (r) = Recorded By, (t) = Taken By, (c) = Cosigned By      Initials Name Provider Type     Pamella Hui, PT Physical Therapist    Rosey Hernandez, RN Registered Nurse    Wilma Vizcarra, OT Occupational Therapist                                 Physical Therapy Education       Title: PT OT SLP Therapies (In Progress)       Topic: Physical Therapy (Done)       Point: Mobility training (Done)       Learning Progress Summary            Patient Acceptance, E, VU,NR by  at 5/16/2025 1613    Acceptance, E, VU,DU,NR by  at  5/16/2025 1121    Acceptance, E, VU by AE at 5/15/2025 1301    Acceptance, E, VU by AE at 5/15/2025 0812    Eager, E,D,H,TB, VU by SC at 5/14/2025 1819    Comment: reviewed HEP                      Point: Home exercise program (Done)       Learning Progress Summary            Patient Acceptance, E, VU,NR by  at 5/16/2025 1613    Acceptance, E, VU,DU,NR by  at 5/16/2025 1121    Acceptance, E, VU by AE at 5/15/2025 1301    Acceptance, E, VU by AE at 5/15/2025 0812    Eager, E,D,H,TB, VU by SC at 5/14/2025 1819    Comment: reviewed HEP                      Point: Body mechanics (Done)       Learning Progress Summary            Patient Acceptance, E, VU,NR by  at 5/16/2025 1613    Acceptance, E, VU,DU,NR by  at 5/16/2025 1121    Acceptance, E, VU by AE at 5/15/2025 1301    Acceptance, E, VU by AE at 5/15/2025 0812    Eager, E,D,H,TB, VU by SC at 5/14/2025 1819    Comment: reviewed HEP                      Point: Precautions (Done)       Learning Progress Summary            Patient Acceptance, E, VU,NR by  at 5/16/2025 1613    Acceptance, E, VU,DU,NR by  at 5/16/2025 1121    Acceptance, E, VU by AE at 5/15/2025 1301    Acceptance, E, VU by AE at 5/15/2025 0812    Eager, E,D,H,TB, VU by SC at 5/14/2025 1819    Comment: reviewed HEP                                      User Key       Initials Effective Dates Name Provider Type Discipline    SC 02/03/23 -  Yanni Clark, PT Physical Therapist PT    AE 09/21/21 -  Dean Bautista, PT Physical Therapist PT     09/21/23 -  Pamella Hui, PT Physical Therapist PT                  PT Recommendation and Plan     Progress: no change  Outcome Evaluation: Pt ambulated 40 ft in PM session w/ FWW, [place]. Mobility limited by pain and fatigue. BP stable throughout session. Pt would continue to benefit from IPPT services while hospitalized to address deficits. Recommend IRF at d/c.     Time Calculation:         PT Charges       Row Name 05/16/25 1613 05/16/25 1127           Time Calculation    Start Time 1529  - 1042  -     PT Received On 05/16/25  - 05/16/25  -     PT Goal Re-Cert Due Date 05/24/25  - 05/24/25  -        Timed Charges    59428 - PT Therapeutic Exercise Minutes 13  -LH 12  -LH     31217 - Gait Training Minutes  10  -LH 12  -LH        Total Minutes    Timed Charges Total Minutes 23  -LH 24  -LH      Total Minutes 23 -LH 24  -LH               User Key  (r) = Recorded By, (t) = Taken By, (c) = Cosigned By      Initials Name Provider Type     Pamella Hui, PT Physical Therapist                  Therapy Charges for Today       Code Description Service Date Service Provider Modifiers Qty    18985036017 HC PT THER PROC EA 15 MIN 5/16/2025 Pamella Hui, PT GP 1    34939565866 HC GAIT TRAINING EA 15 MIN 5/16/2025 Pamella Hui, PT GP 1    97399312295 HC PT THER PROC EA 15 MIN 5/16/2025 Pamella Hui, PT GP 1    27719074425 HC GAIT TRAINING EA 15 MIN 5/16/2025 Pamella Hui, PT GP 1            PT G-Codes  Outcome Measure Options: AM-PAC 6 Clicks Basic Mobility (PT)  AM-PAC 6 Clicks Score (PT): 17  AM-PAC 6 Clicks Score (OT): 21  PT Discharge Summary  Anticipated Discharge Disposition (PT): inpatient rehabilitation facility    Pamella Hui PT  5/16/2025

## 2025-05-16 NOTE — PLAN OF CARE
Oriented x 4. silicon border foam to the right anterior hip CDI. Flexion equally moderate. Patient rests in bed with o sx of acute distress at this time. Scheduled Hydralazine omitted per holding parameter ( DBP < 60 ) Patient rests in bed with no sx of acute distress at this time. VSS on RA. Call light in reach.

## 2025-05-16 NOTE — PLAN OF CARE
Goal Outcome Evaluation:  Plan of Care Reviewed With: patient        Progress: improving  Outcome Evaluation: Pt continues to progress with mobility and ADL independence, however is limited by pain, balance, and decreased activity tolerance. Pt would benefit from ongoing skilled OT services to progress to PLOF. Rec d/c to home with assist.    Anticipated Discharge Disposition (OT): home with assist

## 2025-05-16 NOTE — DISCHARGE PLACEMENT REQUEST
"Khushbu Bledsoe (89 y.o. Female)        [facility] Case Management [phone]       Date of Birth   1934    Social Security Number       Address   [address] [place] [address]    Home Phone   [phone]    MRN   [id number]       Yazdanism   Evangelical    Marital Status                               Admission Date   2025    Admission Type   Elective    Admitting Provider   Julian Mcgee MD    Attending Provider   Julian Mcgee MD    Department, Room/Bed   [facility] 3G, S359/1       Discharge Date       Discharge Disposition       Discharge Destination                                 Attending Provider: Jluian Mcgee MD    Allergies: No Known Allergies    Isolation: None   Infection: None   Code Status: No CPR    Ht: 152.4 cm (60\")   Wt: 60.8 kg (134 lb)    Admission Cmt: None   Principal Problem: Status post total replacement of right hip [Z96.641]                   Active Insurance as of 2025       Primary Coverage       Payor Plan Insurance Group Employer/Plan Group    [employer] MEDICARE REPLACEMENT [employer] Medicare Advantage [employer] PPO [id number]       Payor Plan Address Payor Plan Phone Number Payor Plan Fax Number Effective Dates    [address] [address]   2024 - None Entered    [facility] [id number]         Subscriber Name Subscriber Birth Date Member ID       KHUSHBU BLEDSOE 3/17/1934 [id number]                     Emergency Contacts        (Rel.) Home Phone Work Phone Mobile Phone    CELIA BLEDSOE (Son) [phone] -- [phone]    [place],Amee (Daughter) [phone] -- [phone]    PattiAndreay (Son) [phone] -- [phone]                 History & Physical        Monica Banks APRN at 25 1415       Attestation signed by Ninoska Mendez MD at 25      I have reviewed this documentation and agree.                      Patient Name: Khushbu Bledsoe  MRN: [id number]  : 3/17/1934  DOS: " 5/14/2025    Attending: Julian Mcgee MD    Primary Care Provider: Ana Ross MD      Chief complaint:  Right hip pain    Subjective  Patient is a pleasant 89 y.o. female presented for scheduled surgery by Dr. Mcgee. She underwent right total hip arthroplasty under spinal anesthesia.  She tolerated surgery well and was admitted for further medical management.  Her hip has been painful for several years.  She uses a walker with ambulation.  She denies recent falls.    When seen in PACU she is doing well.  Her pain is well-controlled.  She denies nausea, shortness of breath or chest pain.  No history of DVT or PE.    She has a history of hypertension, congestive heart failure and atrial fibrillation.  She is on chronic Eliquis.  She is followed by Dr. Mesa and had preop cardiac clearance.    Allergies:  No Known Allergies    Meds:  Medications Prior to Admission   Medication Sig Dispense Refill Last Dose/Taking    albuterol sulfate  (90 Base) MCG/ACT inhaler Inhale 2 puffs Every 4 (Four) Hours As Needed for Wheezing. 18 g 0 Past Month    bumetanide (BUMEX) 1 MG tablet Take 1 tablet by mouth Daily.   5/14/2025 at  5:30 AM    carvedilol (COREG) 6.25 MG tablet Take 1 tablet by mouth 2 (Two) Times a Day With Meals. 180 tablet 1 5/14/2025 at  5:30 AM    Cholecalciferol 125 MCG (5000 UT) tablet Take 1 tablet by mouth Daily.   Past Week    gabapentin (NEURONTIN) 100 MG capsule Take 1 capsule by mouth 3 (Three) Times a Day.   5/13/2025 Morning    hydrALAZINE (APRESOLINE) 50 MG tablet Take 1 tablet by mouth 3 (Three) Times a Day. 270 tablet 1 5/14/2025 at  5:30 AM    ipratropium (ATROVENT HFA) 17 MCG/ACT inhaler Inhale 2 puffs 4 (Four) Times a Day. 12.9 g 11 Past Week    lisinopril (PRINIVIL,ZESTRIL) 40 MG tablet Take 1 tablet by mouth Every Morning. 90 tablet 1 5/13/2025 Morning    Multiple Vitamins-Minerals (PRESERVISION AREDS 2 PO) Take 1 capsule by mouth 2 (Two) Times a Day.   Past Week     multivitamin (Multi-Vitamin Daily) tablet tablet Take 1 tablet by mouth Daily.   Past Week    NIFEdipine XL (PROCARDIA XL) 30 MG 24 hr tablet Take 1 tablet by mouth Daily. 90 tablet 1 5/14/2025 at  5:30 AM    Omega-3 Fatty Acids (fish oil) 1200 MG capsule capsule Take 1 capsule by mouth 2 (Two) Times a Day With Meals.   Past Week    pantoprazole (PROTONIX) 40 MG EC tablet Take 1 tablet by mouth Every Morning Before Breakfast. 90 tablet 1 5/13/2025 Morning    spironolactone (ALDACTONE) 12.5 MG tablet half tablet Take 1 half tablet by mouth Daily.   5/14/2025 at  5:30 AM    acetaminophen (TYLENOL) 500 MG tablet Take 2 tablets by mouth every 8 hours 84 tablet 0     apixaban (Eliquis) 2.5 MG tablet tablet Take 1 tablet by mouth Every 12 (Twelve) Hours for 7 days. Then resume home dose. 14 tablet 0     cefadroxil (DURICEF) 500 MG capsule Take 1 capsule by mouth every 12 hours for 3 days 6 capsule 0     Cholecalciferol (Vitamin D3) 50 MCG (2000 UT) tablet Take 1 tablet by mouth Daily. 60 tablet 0     docusate sodium (Colace) 100 MG capsule Take 1-2 capsules by mouth Daily As Needed. 60 capsule 2     Eliquis 2.5 MG tablet tablet Take 1 tablet by mouth Every 12 (Twelve) Hours. (Patient taking differently: Take 1 tablet by mouth Every 12 (Twelve) Hours. Patient instructed to hold 72hours prior to hip surgery.) 180 tablet 3 5/10/2025    ondansetron (ZOFRAN) 4 MG tablet Take 1 tablet by mouth Every 6 (Six) Hours As Needed. 28 tablet 0     oxyCODONE (ROXICODONE) 5 MG immediate release tablet Take 1 tablet by mouth Every 4 (Four) to 6 (Six) Hours As Needed. 60 tablet 0     traMADol (ULTRAM) 50 MG tablet Take 1-2 tablets by mouth Every 6 (Six) Hours. 64 tablet 0     Tranexamic Acid 650 MG tablet Take 3 tablets by mouth once daily BEGINNING the day after surgery 12 tablet 0          History:   Past Medical History:   Diagnosis Date    AF (paroxysmal atrial fibrillation) 09/10/2024    Arthritis 2010    Cervical disc disorder 2010  "   CHF (congestive heart failure)     CKD (chronic kidney disease) stage 4, GFR 15-29 ml/min 02/06/2025    Congenital heart disease     Coronary artery disease     History of shingles 05/2024    Hypertension     Low back pain 2010    Lumbosacral disc disease 2010    Thoracic disc disorder 2010     Past Surgical History:   Procedure Laterality Date    BACK SURGERY  2010    CATARACT EXTRACTION Bilateral     COLONOSCOPY      JOINT REPLACEMENT  5/14/2025    RETINAL DETACHMENT REPAIR Left     SPINAL FUSION  2010     Family History   Problem Relation Age of Onset    Heart attack Brother     Cancer Son     Arthritis Son     Heart disease Son      Social History     Tobacco Use    Smoking status: Never    Smokeless tobacco: Never   Vaping Use    Vaping status: Never Used   Substance Use Topics    Alcohol use: Not Currently    Drug use: Never   She lives with her son.  She has 2 children.  She is a retired .    Review of Systems  All systems were reviewed and negative except for:  Respiratory: positive for  shortness of air  Gastrointestinal: positive for  stool incontinence    Vital Signs  /58 (Patient Position: Lying)   Pulse 60   Temp 97.4 °F (36.3 °C) (Axillary)   Resp 10   Ht 152.4 cm (60\")   Wt 60.8 kg (134 lb)   SpO2 98%   BMI 26.17 kg/m²     Physical Exam:    General Appearance:    Alert, cooperative, in no acute distress   Head:    Normocephalic, without obvious abnormality, atraumatic   Eyes:            Lids and lashes normal, conjunctivae and sclerae normal, no   icterus, no pallor, corneas clear,    Ears:    Ears appear intact with no abnormalities noted   Throat:   No oral lesions, no thrush, oral mucosa moist   Neck:   No adenopathy, supple, trachea midline, no thyromegaly    Lungs:     Clear to auscultation,respirations regular, even and unlabored    Heart:    Regular rhythm and normal rate, normal S1 and S2   Abdomen:     Normal bowel sounds, no masses, no organomegaly, soft nontender, " nondistended, no guarding, no rebound  tenderness   Genitalia:    Deferred   Extremities: Right hip dressing CDI   Pulses:   Pulses palpable and equal bilaterally   Skin:   No bleeding, bruising or rash   Neurologic:   Cranial nerves 2 - 12 grossly intact. Flexion and dorsiflexion intact bilateral feet.        I reviewed the patient's new clinical results.       Lab Results   Component Value Date    HGBA1C 6.50 (H) 05/07/2025      Latest Reference Range & Units 05/07/25 13:29   Sodium 136 - 145 mmol/L 136   Potassium 3.5 - 5.2 mmol/L 4.1   Chloride 98 - 107 mmol/L 99   CO2 22.0 - 29.0 mmol/L 23.0   Anion Gap 5.0 - 15.0 mmol/L 14.0   BUN 8 - 23 mg/dL 65 (H)   Creatinine 0.57 - 1.00 mg/dL 1.84 (H)   BUN/Creatinine Ratio 7.0 - 25.0  35.3 (H)   eGFR >60.0 mL/min/1.73 25.6 (L)   Glucose 65 - 99 mg/dL 198 (H)   Calcium 8.2 - 9.6 mg/dL 9.5   Alkaline Phosphatase 39 - 117 U/L 179 (H)   Total Protein 6.0 - 8.5 g/dL 7.2   Albumin 3.5 - 5.2 g/dL 4.3   Globulin gm/dL 2.9   A/G Ratio g/dL 1.5   AST (SGOT) 1 - 32 U/L 22   ALT (SGPT) 1 - 33 U/L 21   Total Bilirubin 0.0 - 1.2 mg/dL 0.2   Fructosamine 0 - 285 umol/L 327 (H)   Hemoglobin A1C 4.80 - 5.60 % 6.50 (H)   C-Reactive Protein 0.00 - 0.50 mg/dL 0.85 (H)   25 Hydroxy, Vitamin D 30.0 - 100.0 ng/ml 77.8   Protime 12.2 - 15.3 Seconds 15.9 (H)   INR 0.89 - 1.12  1.19 (H)   PTT 22.0 - 39.0 seconds 47.2 (H)   WBC 3.40 - 10.80 10*3/mm3 8.49   RBC 3.77 - 5.28 10*6/mm3 4.05   Hemoglobin 12.0 - 15.9 g/dL 10.9 (L)   Hematocrit 34.0 - 46.6 % 35.3   Platelets 140 - 450 10*3/mm3 332   RDW 12.3 - 15.4 % 14.6   MCV 79.0 - 97.0 fL 87.2   MCH 26.6 - 33.0 pg 26.9   MCHC 31.5 - 35.7 g/dL 30.9 (L)   MPV 6.0 - 12.0 fL 8.8   (H): Data is abnormally high  (L): Data is abnormally low    Assessment and Plan:     Status post total replacement of right hip    Arthritis of right hip    AF (paroxysmal atrial fibrillation)    Essential hypertension    CKD (chronic kidney disease) stage 4, GFR 15-29 ml/min     CHF (congestive heart failure)    Elevated hemoglobin A1c      Plan  1. PT/OT- WBAT RLE  2. Pain control-prns   3. IS-encourage  4. DVT proph- Mechs/Eliquis at prophylactic dose for 1 week, then resume home dose  5. Bowel regimen  6. Resume home medications as appropriate  7. Monitor post-op labs  8. DC planning for rehab    HTN, CHF, afib  - Continue home hydralazine, lisinopril, nifedipine and Coreg  -Hold spironolactone and Bumex for now  - Monitor BP   - Holding parameters for BP meds  - Labetalol PRN for SBP>170    CKD  - Avoid nephrotoxic agents    Elevated hemoglobin A1c: In prediabetic range  - Explained to patient implication of A1C elevation, need to modify diet and increase activity, and f/u with PCP.      ALEXUS Osorio  05/14/25  14:18 EDT    Electronically signed by Ninoska Mendez MD at 05/14/25 1939       Evan De Santiago APRN at 05/14/25 0616       Attestation signed by Julian Mcgee MD at 05/14/25 4634      I have reviewed this documentation and agree.                        Pre-Op H&P  Khushbu Bledsoe  [id number]  3/17/1934      Chief complaint: Right Hip Pain      Subjective:  Patient is a 89 y.o.female presents for scheduled surgery by Dr. Mcgee. She anticipates a TOTAL HIP ARTHROPLASTY ANTERIOR - Right today.  The patient has had right hip pain that has progressively worsened over the past few years.  She endorses having pain with movement and sometimes when sitting.  She denies having pain when lying flat.  Up to this point, conservative treatment methods have failed to alleviate her symptoms.  She does use a walker to ambulate.    The last dose of Eliquis was on 5/10/25.    Review of Systems:  Constitutional-- No fever, chills or sweats. No fatigue.  CV-- No chest pain, palpitation or syncope. +HTN. + A-fib.  Resp-- No SOB, cough, hemoptysis  Skin--No rashes or lesions      Allergies: No Known Allergies      Home Meds:  Medications Prior to Admission   Medication Sig  Dispense Refill Last Dose/Taking    albuterol sulfate  (90 Base) MCG/ACT inhaler Inhale 2 puffs Every 4 (Four) Hours As Needed for Wheezing. 18 g 0 Past Month    bumetanide (BUMEX) 1 MG tablet Take 1 tablet by mouth Daily.   5/14/2025 at  5:30 AM    carvedilol (COREG) 6.25 MG tablet Take 1 tablet by mouth 2 (Two) Times a Day With Meals. 180 tablet 1 5/14/2025 at  5:30 AM    Cholecalciferol 125 MCG (5000 UT) tablet Take 1 tablet by mouth Daily.   Past Week    gabapentin (NEURONTIN) 100 MG capsule Take 1 capsule by mouth 3 (Three) Times a Day.   5/13/2025 Morning    hydrALAZINE (APRESOLINE) 50 MG tablet Take 1 tablet by mouth 3 (Three) Times a Day. 270 tablet 1 5/14/2025 at  5:30 AM    ipratropium (ATROVENT HFA) 17 MCG/ACT inhaler Inhale 2 puffs 4 (Four) Times a Day. 12.9 g 11 Past Week    lisinopril (PRINIVIL,ZESTRIL) 40 MG tablet Take 1 tablet by mouth Every Morning. 90 tablet 1 5/13/2025 Morning    Multiple Vitamins-Minerals (PRESERVISION AREDS 2 PO) Take 1 capsule by mouth 2 (Two) Times a Day.   Past Week    multivitamin (Multi-Vitamin Daily) tablet tablet Take 1 tablet by mouth Daily.   Past Week    NIFEdipine XL (PROCARDIA XL) 30 MG 24 hr tablet Take 1 tablet by mouth Daily. 90 tablet 1 5/14/2025 at  5:30 AM    Omega-3 Fatty Acids (fish oil) 1200 MG capsule capsule Take 1 capsule by mouth 2 (Two) Times a Day With Meals.   Past Week    pantoprazole (PROTONIX) 40 MG EC tablet Take 1 tablet by mouth Every Morning Before Breakfast. 90 tablet 1 5/13/2025 Morning    spironolactone (ALDACTONE) 12.5 MG tablet half tablet Take 1 half tablet by mouth Daily.   5/14/2025 at  5:30 AM    acetaminophen (TYLENOL) 500 MG tablet Take 2 tablets by mouth every 8 hours 84 tablet 0     apixaban (Eliquis) 2.5 MG tablet tablet Take 1 tablet by mouth Every 12 (Twelve) Hours for 7 days. Then resume home dose. 14 tablet 0     cefadroxil (DURICEF) 500 MG capsule Take 1 capsule by mouth every 12 hours for 3 days 6 capsule 0      Cholecalciferol (Vitamin D3) 50 MCG (2000 UT) tablet Take 1 tablet by mouth Daily. 60 tablet 0     docusate sodium (Colace) 100 MG capsule Take 1-2 capsules by mouth Daily As Needed. 60 capsule 2     Eliquis 2.5 MG tablet tablet Take 1 tablet by mouth Every 12 (Twelve) Hours. (Patient taking differently: Take 1 tablet by mouth Every 12 (Twelve) Hours. Patient instructed to hold 72hours prior to hip surgery.) 180 tablet 3 5/10/2025    ondansetron (ZOFRAN) 4 MG tablet Take 1 tablet by mouth Every 6 (Six) Hours As Needed. 28 tablet 0     oxyCODONE (ROXICODONE) 5 MG immediate release tablet Take 1 tablet by mouth Every 4 (Four) to 6 (Six) Hours As Needed. 60 tablet 0     traMADol (ULTRAM) 50 MG tablet Take 1-2 tablets by mouth Every 6 (Six) Hours. 64 tablet 0     Tranexamic Acid 650 MG tablet Take 3 tablets by mouth once daily BEGINNING the day after surgery 12 tablet 0          PMH:   Past Medical History:   Diagnosis Date    AF (paroxysmal atrial fibrillation) 09/10/2024    Arthritis 2010    Cervical disc disorder 2010    CHF (congestive heart failure)     CKD (chronic kidney disease) stage 4, GFR 15-29 ml/min 02/06/2025    Congenital heart disease     Coronary artery disease     History of shingles 05/2024    Hypertension     Low back pain 2010    Lumbosacral disc disease 2010    Thoracic disc disorder 2010     PSH:    Past Surgical History:   Procedure Laterality Date    BACK SURGERY  2010    CATARACT EXTRACTION Bilateral     COLONOSCOPY      JOINT REPLACEMENT  5/14/2025    RETINAL DETACHMENT REPAIR Left     SPINAL FUSION  2010       Immunization History:  Influenza: Yes  Pneumococcal: Yes  Tetanus: No  Covid : x2    Social History:   Tobacco:   Social History     Tobacco Use   Smoking Status Never   Smokeless Tobacco Never      Alcohol:     Social History     Substance and Sexual Activity   Alcohol Use Not Currently         Physical Exam:/62 (BP Location: Right arm, Patient Position: Lying)   Pulse 63    "Temp 97.9 °F (36.6 °C) (Temporal)   Resp 16   Ht 152.4 cm (60\")   Wt 60.8 kg (134 lb)   SpO2 96%   BMI 26.17 kg/m²       General Appearance:    Alert, cooperative, no distress, appears stated age   Head:    Normocephalic, without obvious abnormality, atraumatic   Lungs:     Clear to auscultation bilaterally, respirations unlabored    Heart:   Regular rate and rhythm, S1 and S2 normal    Abdomen:    Soft without tenderness   Extremities:   Extremities normal, atraumatic, no cyanosis or edema   Skin:   Skin color, texture, turgor normal, no rashes or lesions   Neurologic:   Grossly intact     Results Review:     LABS:  Lab Results   Component Value Date    WBC 8.49 05/07/2025    HGB 10.9 (L) 05/07/2025    HCT 35.3 05/07/2025    MCV 87.2 05/07/2025     05/07/2025    NEUTROABS 4.39 05/07/2025    GLUCOSE 198 (H) 05/07/2025    BUN 65 (H) 05/07/2025    CREATININE 1.84 (H) 05/07/2025     05/07/2025    K 4.1 05/07/2025    CL 99 05/07/2025    CO2 23.0 05/07/2025    MG 2.4 06/15/2024    CALCIUM 9.5 05/07/2025    ALBUMIN 4.3 05/07/2025    AST 22 05/07/2025    ALT 21 05/07/2025    BILITOT 0.2 05/07/2025       RADIOLOGY:  Imaging Results (Last 72 Hours)       ** No results found for the last 72 hours. **            I reviewed the patient's new clinical results.    Cancer Staging (if applicable)  Cancer Patient: __ yes _x_no __unknown; If yes, clinical stage T:__ N:__M:__, stage group or __N/A      Impression: Arthritis of right hip      Plan: TOTAL HIP ARTHROPLASTY ANTERIOR - Right       Evan De Santiago, APRANDREEA   5/14/2025   08:38 EDT    Electronically signed by Julina Mcgee MD at 05/14/25 0918       H&P signed by [employer], [employer] at 05/14/25 0742         [Media Unavailable] Scan on 5/14/2025 0730 by [employer], [employer]: H&P RIGHT HIP PAIN OV, BRENT, 04/24/2025          Electronically signed by [employer], [employer] at 05/14/25 0742       Current Facility-Administered Medications   Medication Dose Route " Frequency Provider Last Rate Last Admin    acetaminophen (TYLENOL) tablet 1,000 mg  1,000 mg Oral Q8H Julian Mcgee MD   1,000 mg at 05/16/25 0600    albuterol (PROVENTIL) nebulizer solution 0.083% 2.5 mg/3mL  2.5 mg Nebulization Q6H PRN Ninoska Mendez MD        apixaban (ELIQUIS) tablet 2.5 mg  2.5 mg Oral Q12H Monica Banks APRN   2.5 mg at 05/16/25 0827    sennosides-docusate (PERICOLACE) 8.6-50 MG per tablet 2 tablet  2 tablet Oral BID Ninoska Mendez MD   2 tablet at 05/15/25 0815    And    polyethylene glycol (MIRALAX) packet 17 g  17 g Oral Daily PRN Ninoska Mendez MD        And    bisacodyl (DULCOLAX) EC tablet 5 mg  5 mg Oral Daily PRN Ninoska Mendez MD        And    bisacodyl (DULCOLAX) suppository 10 mg  10 mg Rectal Daily PRN Ninoska Mendez MD        [Held by provider] bumetanide (BUMEX) tablet 1 mg  1 mg Oral Daily Ninoska Mendez MD        carvedilol (COREG) tablet 6.25 mg  6.25 mg Oral BID With Meals Ninoska Mendez MD        gabapentin (NEURONTIN) capsule 100 mg  100 mg Oral TID Ninoska Mendez MD   100 mg at 05/16/25 0827    hydrALAZINE (APRESOLINE) tablet 50 mg  50 mg Oral TID Ninoska Mendez MD        HYDROmorphone (DILAUDID) injection 0.5 mg  0.5 mg Intravenous Q2H PRN Julian Mcgee MD   0.5 mg at 05/14/25 1325    And    naloxone (NARCAN) injection 0.1 mg  0.1 mg Intravenous Q5 Min PRN Julian Mcgee MD        ipratropium (ATROVENT) nebulizer solution 0.5 mg  0.5 mg Nebulization TID - RT Ninoska Mendez MD   0.5 mg at 05/16/25 0703    labetalol (NORMODYNE,TRANDATE) injection 10 mg  10 mg Intravenous Q4H PRN Monica Banks APRN        lisinopril (PRINIVIL,ZESTRIL) tablet 40 mg  40 mg Oral QAM Ninoska Mendez MD   40 mg at 05/16/25 0602    NIFEdipine XL (PROCARDIA XL) 24 hr tablet 30 mg  30 mg Oral Daily Ninoska Mendez MD        ondansetron (ZOFRAN) injection 4 mg  4 mg Intravenous Q6H PRN Ninoska Mendez MD   4 mg  "at 05/14/25 1851    oxyCODONE (ROXICODONE) immediate release tablet 10 mg  10 mg Oral Q4H PRN Julian Mcgee MD        oxyCODONE (ROXICODONE) immediate release tablet 5 mg  5 mg Oral Q4H PRN Julian Mcgee MD        sodium chloride 0.9 % bolus 500 mL  500 mL Intravenous TID PRN Monica Banks APRN        traMADol (ULTRAM) tablet 50 mg  50 mg Oral Q12H PRN Julian Mcgee MD            Physician Progress Notes (most recent note)        Julian Mcgee MD at 05/15/25 1026            Orthopedic Progress Note      Patient: Khushbu Bledsoe  YOB: 1934     Date of Admission: 5/14/2025  7:03 AM Medical Record Number: [id number]     Attending Physician: Julian Mcgee MD    Status Post:  Procedure(s):  TOTAL HIP ARTHROPLASTY ANTERIOR RIGHT Post Operative Day Number: 1    Subjective : No new orthopaedic complaints     Pain Relief: some relief with present medication.     Systemic Complaints: No Complaints  Vitals:    05/15/25 0344 05/15/25 0659 05/15/25 0721 05/15/25 0815   BP: 102/47 115/50  99/53   BP Location: Right arm Right arm     Patient Position: Lying Lying     Pulse: 68 65 62 72   Resp: 16 16     Temp: 97 °F (36.1 °C) 98 °F (36.7 °C)     TempSrc: Oral Oral     SpO2: 91% 96% 94%    Weight:       Height:           Physical Exam: 89 y.o. female    General Appearance:       Alert, cooperative, in no acute distress                  Extremities:    Dressing Clean, Dry and Intact             No clinical sign of DVT        Able to do good movements of digits    Pulses:   Pulses palpable and equal bilaterally           Diagnostic Tests:             Results from last 7 days   Lab Units 05/14/25  0828   INR  1.06   APTT seconds 41.0*     No results found for: \"URICACID\"  No results found for: \"CRYSTAL\"  Microbiology Results (last 10 days)       Procedure Component Value - Date/Time    MRSA Screen Culture (Outpatient) - Swab, Nares [[id number]]  (Normal) Collected: 05/07/25 1329    Lab Status: Final " result Specimen: Swab from Nares Updated: 05/08/25 1610     MRSA Screen Cx No Methicillin Resistant Staphylococcus aureus isolated    Narrative:      The negative predictive value of this diagnostic test is high and should only be used to consider de-escalating anti-MRSA therapy. A positive result may indicate colonization with MRSA and must be correlated clinically.            XR Hip With or Without Pelvis 2 - 3 View Right  Result Date: 5/14/2025  Impression: Total hip arthroplasty with findings as described. Osteopenia. Electronically Signed: Danyelle Knox MD  5/14/2025 1:07 PM EDT  Workstation ID: [id number]        Current Medications:  Scheduled Meds:acetaminophen, 1,000 mg, Oral, Q8H  apixaban, 2.5 mg, Oral, Q12H  [Held by provider] bumetanide, 1 mg, Oral, Daily  carvedilol, 6.25 mg, Oral, BID With Meals  gabapentin, 100 mg, Oral, TID  hydrALAZINE, 50 mg, Oral, TID  ipratropium, 0.5 mg, Nebulization, TID - RT  lisinopril, 40 mg, Oral, QAM  NIFEdipine XL, 30 mg, Oral, Daily  senna-docusate sodium, 2 tablet, Oral, BID      Continuous Infusions:lactated ringers, 100 mL/hr, Last Rate: 100 mL/hr (05/14/25 6877)      PRN Meds:.  albuterol    senna-docusate sodium **AND** polyethylene glycol **AND** bisacodyl **AND** bisacodyl    HYDROmorphone **AND** naloxone    labetalol    ondansetron    oxyCODONE    oxyCODONE    sodium chloride    traMADol    Assessment: Status post  NV ARTHRP ACETBLR/PROX FEM PROSTC AGRFT/ALGRFT [[id number]] (TOTAL HIP ARTHROPLASTY ANTERIOR)    Patient Active Problem List   Diagnosis    AF (paroxysmal atrial fibrillation)    Acute on chronic diastolic CHF (congestive heart failure)    Essential hypertension    Nonrheumatic mitral valve regurgitation    Lumbar radiculopathy    Age-related physical debility    Loss of balance    Lumbar canal stenosis    Difficulty breathing    CKD (chronic kidney disease) stage 4, GFR 15-29 ml/min    Arthritis of right hip    Hyperglycemia    Status post total replacement  of right hip    CHF (congestive heart failure)    Elevated hemoglobin A1c       PLAN:   Continues current post-op course  Anticoagulation: eliquis 2.5 bid for 1 week  Mobilize with PT as tolerated per protocol    Weight Bearing: WBAT  Discharge Plan: OK to plan for discharge in  today to rehab  from orthopaedic perspective.    Julian Mcgee MD    Date: 5/15/2025    Time: 10:26 EDT    Electronically signed by Julian Mcgee MD at 05/15/25 1027       Consult Notes (most recent note)    No notes of this type exist for this encounter.          Physical Therapy Notes (most recent note)        Dean Bautista, PT at 05/15/25 1301  Version 1 of 1         Patient Name: Khushbu Bledsoe  : 3/17/1934    MRN: [id number]                              Today's Date: 5/15/2025       Admit Date: 2025    Visit Dx: No diagnosis found.  Patient Active Problem List   Diagnosis    AF (paroxysmal atrial fibrillation)    Acute on chronic diastolic CHF (congestive heart failure)    Essential hypertension    Nonrheumatic mitral valve regurgitation    Lumbar radiculopathy    Age-related physical debility    Loss of balance    Lumbar canal stenosis    Difficulty breathing    CKD (chronic kidney disease) stage 4, GFR 15-29 ml/min    Arthritis of right hip    Hyperglycemia    Status post total replacement of right hip    CHF (congestive heart failure)    Elevated hemoglobin A1c     Past Medical History:   Diagnosis Date    AF (paroxysmal atrial fibrillation) 09/10/2024    Arthritis 2010    Cervical disc disorder 2010    CHF (congestive heart failure)     CKD (chronic kidney disease) stage 4, GFR 15-29 ml/min 2025    Congenital heart disease     Coronary artery disease     History of shingles 2024    Hypertension     Low back pain 2010    Lumbosacral disc disease 2010    Thoracic disc disorder 2010     Past Surgical History:   Procedure Laterality Date    BACK SURGERY  2010    CATARACT EXTRACTION Bilateral     COLONOSCOPY       JOINT REPLACEMENT  5/14/2025    RETINAL DETACHMENT REPAIR Left     SPINAL FUSION  2010    TOTAL HIP ARTHROPLASTY Right 5/14/2025    Procedure: TOTAL HIP ARTHROPLASTY ANTERIOR RIGHT;  Surgeon: Julian Mcgee MD;  Location: [facility];  Service: Orthopedics;  Laterality: Right;      General Information       Row Name 05/15/25 1551 05/15/25 0940       Physical Therapy Time and Intention    Document Type therapy note (daily note)  -AE therapy note (daily note)  -AE    Mode of Treatment physical therapy  -AE physical therapy  -AE      Row Name 05/15/25 1551 05/15/25 0940       General Information    Patient Profile Reviewed yes  -AE yes  -AE    Existing Precautions/Restrictions fall;hip, anterior;other (see comments)  WBAT RLE  -AE fall;hip;hip, anterior;right  -AE    Barriers to Rehab previous functional deficit  -AE previous functional deficit  -AE      Row Name 05/15/25 1551          Living Environment    Current Living Arrangements home  -AE       Row Name 05/15/25 1551          Cognition    Orientation Status (Cognition) oriented x 3  -AE       Row Name 05/15/25 1551 05/15/25 0940       Safety Issues/Impairments Affecting Functional Mobility    Safety Issues Affecting Function (Mobility) awareness of need for assistance;insight into deficits/self-awareness;safety precaution awareness;safety precautions follow-through/compliance;sequencing abilities  -AE insight into deficits/self-awareness;safety precaution awareness;sequencing abilities  -AE    Impairments Affecting Function (Mobility) pain;strength;range of motion (ROM);balance;endurance/activity tolerance  -AE pain;strength;range of motion (ROM);motor control;balance  -AE              User Key  (r) = Recorded By, (t) = Taken By, (c) = Cosigned By      Initials Name Provider Type    AE Dean Bautista PT Physical Therapist                   Mobility       Row Name 05/15/25 1552 05/15/25 0941       Bed Mobility    Bed Mobility supine-sit  -AE supine-sit  -AE     Supine-Sit [place] (Bed Mobility) minimum assist (75% patient effort);1 person assist;verbal cues  -AE moderate assist (50% patient effort);1 person assist;verbal cues  -AE    Assistive Device (Bed Mobility) head of bed elevated;bed rails;repositioning sheet  -AE head of bed elevated  -AE    Comment, (Bed Mobility) VCs for hand placement and sequencing. Pt required increased cues to improve sequencing of BLE to EOB. Pt required increased assist for scooting to EOB to place feet on floor.  -AE VCs for hand placement and sequencing. Pt required increased assist to advance BLE to EOB.  -AE      Row Name 05/15/25 1552 05/15/25 0941       Transfers    Comment, (Transfers) VCs for hand placement and sequencing. Pt required increased cues to improve upright posture and weightbearing onto RLE in standing.  -AE VCs for hand placement and sequencing. Pt required increased cues to push up from seated surface and not pulling up from RW.  -AE      Row Name 05/15/25 1552 05/15/25 0941       Sit-Stand Transfer    Sit-Stand [place] (Transfers) minimum assist (75% patient effort);verbal cues  -AE minimum assist (75% patient effort);1 person assist;verbal cues  -AE    Assistive Device (Sit-Stand Transfers) walker, front-wheeled  -AE walker, front-wheeled  -AE      Row Name 05/15/25 1552 05/15/25 0941       Gait/Stairs (Locomotion)    [place] Level (Gait) minimum assist (75% patient effort);1 person assist;verbal cues  -AE minimum assist (75% patient effort);1 person assist;verbal cues  -AE    Assistive Device (Gait) walker, front-wheeled  -AE walker, front-wheeled  -AE    Distance in Feet (Gait) 100  -AE 35  -AE    Deviations/Abnormal Patterns (Gait) right sided deviations;gait speed decreased;stride length decreased;weight shifting decreased;antalgic  -AE right sided deviations;gait speed decreased;stride length decreased;weight shifting decreased;antalgic  -AE    Bilateral Gait Deviations forward flexed posture   -AE forward flexed posture  -AE    Comment, (Gait/Stairs) Pt demo step through gait pattern with slowed kasia and decreased gait speed. Pt continues to ambulate with antalgic gait pattern and required increased cues/education for proper heel-toe gait pattern. Pt able to improve gait pattern during session but required cues throughout to focus on steps. Further distance limited by fatigue.  -AE Pt demo step through gait pattern with slowed kasia and decreased step length. No c/o dizziness this session. Pt reported increased R hip pain with increased ambulation distance. Further distance limited by hip pain and weakness.  -AE      Row Name 05/15/25 1552 05/15/25 0941       Mobility    Extremity Weight-bearing Status right lower extremity  -AE right lower extremity  -AE    Right Lower Extremity (Weight-bearing Status) weight-bearing as tolerated (WBAT)  -AE weight-bearing as tolerated (WBAT)  -AE              User Key  (r) = Recorded By, (t) = Taken By, (c) = Cosigned By      Initials Name Provider Type    AE Dean Bautista PT Physical Therapist                   Obj/Interventions       Row Name 05/15/25 1559 05/15/25 0951       Motor Skills    Therapeutic Exercise hip;knee;ankle  -AE hip;knee;ankle  -AE      Row Name 05/15/25 0951          Hip (Therapeutic Exercise)    Hip (Therapeutic Exercise) AROM (active range of motion)  -AE     Hip AROM (Therapeutic Exercise) right;flexion;extension;aBduction;aDduction;sitting;10 repetitions  -AE       Row Name 05/15/25 1559 05/15/25 0951       Knee (Therapeutic Exercise)    Knee (Therapeutic Exercise) AROM (active range of motion)  -AE AROM (active range of motion);isometric exercises  -AE    Knee AROM (Therapeutic Exercise) right;LAQ (long arc quad);10 repetitions;sitting  -AE right;LAQ (long arc quad);heel slides;10 repetitions;sitting  -AE    Knee Isometrics (Therapeutic Exercise) -- bilateral;gluteal sets;quad sets;10 repetitions;3 second hold  -AE      Row Name  05/15/25 1559 05/15/25 0951       Ankle (Therapeutic Exercise)    Ankle (Therapeutic Exercise) AROM (active range of motion)  -AE AROM (active range of motion)  -AE    Ankle AROM (Therapeutic Exercise) bilateral;dorsiflexion;15 repititions;sitting  -AE bilateral;dorsiflexion;plantarflexion;10 repetitions  -AE      Row Name 05/15/25 1559 05/15/25 0951       Balance    Balance Assessment sitting static balance;sitting dynamic balance;standing static balance;standing dynamic balance  -AE sitting static balance;sitting dynamic balance;standing static balance;standing dynamic balance  -AE    Static Sitting Balance standby assist  -AE contact guard  -AE    Dynamic Sitting Balance contact guard  -AE contact guard  -AE    Position, Sitting Balance unsupported;sitting edge of bed  -AE unsupported;sitting edge of bed  -AE    Static Standing Balance contact guard  -AE minimal assist  -AE    Dynamic Standing Balance minimal assist  -AE minimal assist  -AE    Position/Device Used, Standing Balance supported;walker, front-wheeled  -AE supported;walker, front-wheeled  -AE      Row Name 05/15/25 0951          Sensory Assessment (Somatosensory)    Sensory Assessment (Somatosensory) other (see comments)  R hip numbness  -AE               User Key  (r) = Recorded By, (t) = Taken By, (c) = Cosigned By      Initials Name Provider Type    AE Dean Bautista PT Physical Therapist                   Goals/Plan    No documentation.                  Clinical Impression       Row Name 05/15/25 1600 05/15/25 0953       Pain    Pretreatment Pain Rating -- 0/10 - no pain  -AE    Posttreatment Pain Rating -- 2/10  -AE    Pain Location hip  -AE hip  -AE    Pain Side/Orientation right  -AE right  -AE    Pain Management Interventions activity modification encouraged;exercise or physical activity utilized;positioning techniques utilized  -AE activity modification encouraged;exercise or physical activity utilized;cold applied  -AE    Response to Pain  Interventions activity level improved;activity participation with tolerable pain  -AE activity participation with increased pain  -AE    Additional Documentation Pain Scale: FACES Pre/Post-Treatment (Group)  -AE --      Row Name 05/15/25 1600          Pain Scale: FACES Pre/Post-Treatment    Pain: FACES Scale, Pretreatment 2-->hurts little bit  -AE     Posttreatment Pain Rating 2-->hurts little bit  -AE       Row Name 05/15/25 1600 05/15/25 0953       Plan of Care Review    Plan of Care Reviewed With patient  -AE patient  -AE    Progress improving  -AE improving  -AE    Outcome Evaluation Pt continues to present with decreased functional mobility and decreased activity tolerance. Pt ambulated 100ft with min A and RW for support. Continue to progress per pt tolerance.  -AE Pt continues to present with decreased functional mobility and strength deficits limiting independence. Pt ambulated 35ft with min A and RW for support. Continue to progress per pt tolerance.  -AE      Row Name 05/15/25 1600 05/15/25 0953       Vital Signs    Pre Systolic BP Rehab 123  -AE 99  -AE    Pre Treatment Diastolic BP 53  -AE 58  -AE    Post Systolic BP Rehab 116  -  -AE    Post Treatment Diastolic BP 51  -AE 43  -AE    Pre SpO2 (%) 97  -AE 98  -AE    O2 Delivery Pre Treatment room air  -AE room air  -AE    O2 Delivery Intra Treatment room air  -AE room air  -AE    Post SpO2 (%) -- 97  -AE    O2 Delivery Post Treatment room air  -AE room air  -AE    Pre Patient Position Supine  -AE Supine  -AE    Intra Patient Position Standing  -AE Standing  -AE    Post Patient Position Sitting  -AE Sitting  -AE      Row Name 05/15/25 1600 05/15/25 0953       Positioning and Restraints    Pre-Treatment Position in bed  -AE in bed  -AE    Post Treatment Position bed  -AE chair  -AE    In Bed notified nsg;sitting EOB;call light within reach;with OT  -AE --    In Chair -- notified nsg;reclined;call light within reach;encouraged to call for assist;exit  alarm on;waffle cushion;legs elevated;compression device;heels elevated  -AE              User Key  (r) = Recorded By, (t) = Taken By, (c) = Cosigned By      Initials Name Provider Type    AE Dean Bautista, PT Physical Therapist                   Outcome Measures       Row Name 05/15/25 1602 05/15/25 0956       How much help from another person do you currently need...    Turning from your back to your side while in flat bed without using bedrails? 3  -AE 3  -AE    Moving from lying on back to sitting on the side of a flat bed without bedrails? 3  -AE 3  -AE    Moving to and from a bed to a chair (including a wheelchair)? 3  -AE 3  -AE    Standing up from a chair using your arms (e.g., wheelchair, bedside chair)? 3  -AE 3  -AE    Climbing 3-5 steps with a railing? 2  -AE 2  -AE    To walk in hospital room? 3  -AE 3  -AE    AM-PAC 6 Clicks Score (PT) 17  -AE 17  -AE    Highest Level of Mobility Goal Stand (1 or More Minutes)-5  -AE Stand (1 or More Minutes)-5  -AE      Row Name 05/15/25 0830          How much help from another person do you currently need...    Turning from your back to your side while in flat bed without using bedrails? 3  -OD     Moving from lying on back to sitting on the side of a flat bed without bedrails? 3  -OD     Moving to and from a bed to a chair (including a wheelchair)? 3  -OD     Standing up from a chair using your arms (e.g., wheelchair, bedside chair)? 3  -OD     Climbing 3-5 steps with a railing? 2  -OD     To walk in hospital room? 3  -OD     AM-PAC 6 Clicks Score (PT) 17  -OD     Highest Level of Mobility Goal Stand (1 or More Minutes)-5  -OD       Row Name 05/15/25 1602 05/15/25 1524       Functional Assessment    Outcome Measure Options AM-PAC 6 Clicks Basic Mobility (PT)  -AE AM-PAC 6 Clicks Daily Activity (OT)  -JY      Row Name 05/15/25 0956          Functional Assessment    Outcome Measure Options AM-PAC 6 Clicks Basic Mobility (PT)  -AE               User Key  (r) =  Recorded By, (t) = Taken By, (c) = Cosigned By      Initials Name Provider Type    OD Lucille Andrade, RN Registered Nurse    Joi Rebollar, OT Occupational Therapist    Dean Huggins, PT Physical Therapist                                 Physical Therapy Education       Title: PT OT SLP Therapies (In Progress)       Topic: Physical Therapy (Done)       Point: Mobility training (Done)       Learning Progress Summary            Patient Acceptance, E, VU by AE at 5/15/2025 1301    Acceptance, E, VU by AE at 5/15/2025 0812    Eager, E,D,H,TB, VU by SC at 5/14/2025 1819    Comment: reviewed HEP                      Point: Home exercise program (Done)       Learning Progress Summary            Patient Acceptance, E, VU by AE at 5/15/2025 1301    Acceptance, E, VU by AE at 5/15/2025 0812    Eager, E,D,H,TB, VU by SC at 5/14/2025 1819    Comment: reviewed HEP                      Point: Body mechanics (Done)       Learning Progress Summary            Patient Acceptance, E, VU by AE at 5/15/2025 1301    Acceptance, E, VU by AE at 5/15/2025 0812    Eager, E,D,H,TB, VU by SC at 5/14/2025 1819    Comment: reviewed HEP                      Point: Precautions (Done)       Learning Progress Summary            Patient Acceptance, E, VU by AE at 5/15/2025 1301    Acceptance, E, VU by AE at 5/15/2025 0812    Eager, E,D,H,TB, VU by SC at 5/14/2025 1819    Comment: reviewed HEP                                      User Key       Initials Effective Dates Name Provider Type Discipline    SC 02/03/23 -  Yanni Clark, PT Physical Therapist PT     09/21/21 -  Dean Bautista PT Physical Therapist PT                  PT Recommendation and Plan     Progress: improving  Outcome Evaluation: Pt continues to present with decreased functional mobility and decreased activity tolerance. Pt ambulated 100ft with min A and RW for support. Continue to progress per pt tolerance.     Time Calculation:         PT Charges       Row Name  05/15/25 1603 05/15/25 0956          Time Calculation    Start Time 1301  -AE 0812  -AE     PT Received On 05/15/25  -AE 05/15/25  -AE     PT Goal Re-Cert Due Date 25  -AE 25  -AE        Timed Charges    60129 - PT Therapeutic Exercise Minutes 5  -AE 12  -AE     74886 - PT Therapeutic Activity Minutes 10  -AE 17  -AE        Total Minutes    Timed Charges Total Minutes 15  -AE 29  -AE      Total Minutes 15  -AE 29  -AE               User Key  (r) = Recorded By, (t) = Taken By, (c) = Cosigned By      Initials Name Provider Type    AE Dean Bautista, PT Physical Therapist                  Therapy Charges for Today       Code Description Service Date Service Provider Modifiers Qty    63722657033 HC PT THER PROC EA 15 MIN 5/15/2025 Dean Bautista, PT GP 1    82844870439 HC PT THERAPEUTIC ACT EA 15 MIN 5/15/2025 Dean Bautista, PT GP 1    43038037774 HC PT THER SUPP EA 15 MIN 5/15/2025 Dean Bautista, PT GP 2    94374878374 HC PT THERAPEUTIC ACT EA 15 MIN 5/15/2025 Dean Bautista, PT GP 1            PT G-Codes  Outcome Measure Options: AM-PAC 6 Clicks Basic Mobility (PT)  AM-PAC 6 Clicks Score (PT): 17  AM-PAC 6 Clicks Score (OT): 17  PT Discharge Summary  Anticipated Discharge Disposition (PT): inpatient rehabilitation facility    Dean Bautista PT  5/15/2025      Electronically signed by Dean Bautista PT at 05/15/25 1604          Occupational Therapy Notes (most recent note)        Wilma Bruno, OT at [phone]          Patient Name: Khushbu Bledsoe  : 3/17/1934    MRN: [id number]                              Today's Date: 2025       Admit Date: 2025    Visit Dx: No diagnosis found.  Patient Active Problem List   Diagnosis    AF (paroxysmal atrial fibrillation)    Acute on chronic diastolic CHF (congestive heart failure)    Essential hypertension    Nonrheumatic mitral valve regurgitation    Lumbar radiculopathy    Age-related physical debility    Loss of balance     Lumbar canal stenosis    Difficulty breathing    CKD (chronic kidney disease) stage 4, GFR 15-29 ml/min    Arthritis of right hip    Hyperglycemia    Status post total replacement of right hip    CHF (congestive heart failure)    Elevated hemoglobin A1c     Past Medical History:   Diagnosis Date    AF (paroxysmal atrial fibrillation) 09/10/2024    Arthritis 2010    Cervical disc disorder 2010    CHF (congestive heart failure)     CKD (chronic kidney disease) stage 4, GFR 15-29 ml/min 02/06/2025    Congenital heart disease     Coronary artery disease     History of shingles 05/2024    Hypertension     Low back pain 2010    Lumbosacral disc disease 2010    Thoracic disc disorder 2010     Past Surgical History:   Procedure Laterality Date    BACK SURGERY  2010    CATARACT EXTRACTION Bilateral     COLONOSCOPY      JOINT REPLACEMENT  5/14/2025    RETINAL DETACHMENT REPAIR Left     SPINAL FUSION  2010    TOTAL HIP ARTHROPLASTY Right 5/14/2025    Procedure: TOTAL HIP ARTHROPLASTY ANTERIOR RIGHT;  Surgeon: Julian Mcgee MD;  Location: [facility];  Service: Orthopedics;  Laterality: Right;      General Information       Row Name 05/16/25 0830          OT Time and Intention    Document Type therapy note (daily note)  -AJ     Mode of Treatment occupational therapy  -AJ       Row Name 05/16/25 0830          General Information    Patient Profile Reviewed yes  -AJ     Existing Precautions/Restrictions fall;hip, anterior;other (see comments)  RLE WBAT, low H&H  -AJ       Row Name 05/16/25 0830          Cognition    Orientation Status (Cognition) oriented x 3  -AJ       Row Name 05/16/25 0830          Safety Issues/Impairments Affecting Functional Mobility    Safety Issues Affecting Function (Mobility) awareness of need for assistance;positioning of assistive device;safety precautions follow-through/compliance;sequencing abilities;insight into deficits/self-awareness  -AJ     Impairments Affecting Function (Mobility)  pain;strength;range of motion (ROM);balance;endurance/activity tolerance  -               User Key  (r) = Recorded By, (t) = Taken By, (c) = Cosigned By      Initials Name Provider Type    Wilma Vizcarra OT Occupational Therapist                     Mobility/ADL's       Row Name 05/16/25 0831          Bed Mobility    Bed Mobility supine-sit;scooting/bridging  -     Scooting/Bridging [place] (Bed Mobility) standby assist;1 person assist  -     Supine-Sit [place] (Bed Mobility) contact guard;1 person assist;verbal cues  Cues for sequencing  -     Bed Mobility, Safety Issues decreased use of legs for bridging/pushing  -     Assistive Device (Bed Mobility) head of bed elevated;bed rails  -     Comment, (Bed Mobility) Cues for sequencing. Increased time to transition RLE to EOB.  -       Row Name 05/16/25 0831          Transfers    Transfers sit-stand transfer;toilet transfer;stand-sit transfer  -       Row Name 05/16/25 0831          Sit-Stand Transfer    Sit-Stand [place] (Transfers) contact guard;1 person assist  -     Assistive Device (Sit-Stand Transfers) walker, front-wheeled  -     Comment, (Sit-Stand Transfer) No cues or physical assist required  -       Row Name 05/16/25 0831          Stand-Sit Transfer    Stand-Sit [place] (Transfers) contact guard;1 person assist  -     Assistive Device (Stand-Sit Transfers) walker, front-wheeled  -     Comment, (Stand-Sit Transfer) Cues for alignment and eccentric lowering  -       Row Name 05/16/25 0831          Toilet Transfer    Type (Toilet Transfer) sit-stand;stand-sit  -     [place] Level (Toilet Transfer) contact guard;1 person assist  -     Assistive Device (Toilet Transfer) commode;grab bars/safety frame;raised toilet seat;walker, front-wheeled  -       Row Name 05/16/25 0831          Functional Mobility    Functional Mobility- Ind. Level contact guard assist;1 person  -     Functional Mobility- Device  walker, front-wheeled  -     Functional Mobility-Distance (Feet) --  <HH distance  -       Row Name 05/16/25 0831          Activities of Daily Living    BADL Assessment/Intervention toileting;grooming  -       Row Name 05/16/25 0831          Hygiene Care    Oral Care teeth brushed - regular toothbrush  -       Row Name 05/16/25 0831          Mobility    Extremity Weight-bearing Status right lower extremity  -     Right Lower Extremity (Weight-bearing Status) weight-bearing as tolerated (WBAT)  -       Row Name 05/16/25 0831          Grooming Assessment/Training    [place] Level (Grooming) oral care regimen;wash face, hands;standby assist  -     Position (Grooming) sink side;supported standing  -       Row Name 05/16/25 0831          Toileting Assessment/Training    [place] Level (Toileting) adjust/manage clothing;perform perineal hygiene;standby assist  -     Assistive Devices (Toileting) commode;grab bar/safety frame  -     Position (Toileting) unsupported sitting  -               User Key  (r) = Recorded By, (t) = Taken By, (c) = Cosigned By      Initials Name Provider Type    AJ Wilma Bruno OT Occupational Therapist                   Obj/Interventions       Row Name 05/16/25 0835          Balance    Balance Assessment sitting static balance;sitting dynamic balance;sit to stand dynamic balance;standing static balance;standing dynamic balance  -     Static Sitting Balance supervision  -     Dynamic Sitting Balance standby assist  -     Position, Sitting Balance unsupported;sitting edge of bed  -     Static Standing Balance contact guard  -     Dynamic Standing Balance contact guard  -     Position/Device Used, Standing Balance supported;walker, front-wheeled  -     Balance Interventions sitting;standing;sit to stand;supported;static;dynamic;occupation based/functional task  -               User Key  (r) = Recorded By, (t) = Taken By, (c) = Cosigned By       Initials Name Provider Type    AJ Wilma Bruno OT Occupational Therapist                   Goals/Plan    No documentation.                  Clinical Impression       Row Name 05/16/25 0835          Pain Assessment    Pretreatment Pain Rating 0/10 - no pain  -     Posttreatment Pain Rating 4/10  -     Pain Location hip  -     Pain Side/Orientation right  -     Pain Management Interventions activity modification encouraged;exercise or physical activity utilized;nursing notified;positioning techniques utilized;cold applied  -     Response to Pain Interventions activity participation with tolerable pain  -       Row Name 05/16/25 08          Plan of Care Review    Plan of Care Reviewed With patient  -     Progress improving  -     Outcome Evaluation Pt continues to progress with mobility and ADL independence, however is limited by pain, balance, and decreased activity tolerance. Pt would benefit from ongoing skilled OT services to progress to PLOF. Rec d/c to home with assist.  -       Row Name 05/16/25 0835          Therapy Plan Review/Discharge Plan (OT)    Anticipated Discharge Disposition (OT) home with assist  -[facility] Name 05/16/25 0835          Vital Signs    Pre Systolic BP Rehab 140  -AJ     Pre Treatment Diastolic BP 54  -AJ     Pre SpO2 (%) 94  -AJ     O2 Delivery Pre Treatment room air  -AJ     Post SpO2 (%) 96  -AJ     O2 Delivery Post Treatment room air  -AJ     Pre Patient Position Supine  -AJ     Intra Patient Position Standing  -AJ     Post Patient Position Sitting  -       Row Name 05/16/25 08          Positioning and Restraints    Pre-Treatment Position in bed  -AJ     Post Treatment Position chair  -AJ     In Chair notified nsg;reclined;sitting;call light within reach;encouraged to call for assist;exit alarm on;legs elevated;waffle cushion;with nsg  -               User Key  (r) = Recorded By, (t) = Taken By, (c) = Cosigned By      Initials Name Provider Type      Wilma Bruno OT Occupational Therapist                   Outcome Measures       Row Name 05/16/25 0838          How much help from another is currently needed...    Putting on and taking off regular lower body clothing? 3  -AJ     Bathing (including washing, rinsing, and drying) 3  -AJ     Toileting (which includes using toilet bed pan or urinal) 3  -AJ     Putting on and taking off regular upper body clothing 4  -AJ     Taking care of personal grooming (such as brushing teeth) 4  -AJ     Eating meals 4  -AJ     AM-PAC 6 Clicks Score (OT) 21  -AJ       Row Name 05/16/25 0838          Functional Assessment    Outcome Measure Options AM-PAC 6 Clicks Daily Activity (OT)  -               User Key  (r) = Recorded By, (t) = Taken By, (c) = Cosigned By      Initials Name Provider Type    Wilma Vizcarra OT Occupational Therapist                    Occupational Therapy Education       Title: PT OT SLP Therapies (In Progress)       Topic: Occupational Therapy (In Progress)       Point: ADL training (Done)       Learning Progress Summary            Patient Acceptance, E,D, VU,DU by JAYDEN at 5/16/2025 0839    Acceptance, E,D, NR by DANAY at 5/15/2025 1303                      Point: Precautions (Done)       Learning Progress Summary            Patient Acceptance, E,D, VU,DU by JAYDEN at 5/16/2025 0839    Acceptance, E,D, NR by DANAY at 5/15/2025 1303                      Point: Body mechanics (Done)       Learning Progress Summary            Patient Acceptance, E,D, VU,DU by JAYDEN at 5/16/2025 0839    Acceptance, E,D, NR by DANAY at 5/15/2025 1303                                      User Key       Initials Effective Dates Name Provider Type Discipline    DANAY 06/16/21 -  Joi Szymanski OT Occupational Therapist OT    JAYDEN 08/26/24 -  Wilma Bruno OT Occupational Therapist OT                  OT Recommendation and Plan     Plan of Care Review  Plan of Care Reviewed With: patient  Progress: improving  Outcome Evaluation: Pt continues  to progress with mobility and ADL independence, however is limited by pain, balance, and decreased activity tolerance. Pt would benefit from ongoing skilled OT services to progress to PLOF. Rec d/c to home with assist.     Time Calculation:         Time Calculation- OT       Row Name 05/16/25 0839             Time Calculation- OT    OT Start Time 0745  -AJ      OT Received On 05/16/25  -      OT Goal Re-Cert Due Date 05/25/25  -         Timed Charges    37625 - OT Therapeutic Activity Minutes 5  -AJ      28726 - OT Self Care/Mgmt Minutes 23  -         Total Minutes    Timed Charges Total Minutes 28  -       Total Minutes 28  -AJ                User Key  (r) = Recorded By, (t) = Taken By, (c) = Cosigned By      Initials Name Provider Type     Wilma Bruno OT Occupational Therapist                  Therapy Charges for Today       Code Description Service Date Service Provider Modifiers Qty    28558826679 HC OT SELF CARE/MGMT/TRAIN EA 15 MIN 5/16/2025 Wilma Bruno OT GO 2                 Wilma Bruno OT  5/16/2025    Electronically signed by Wilma Bruno OT at 05/16/25 0840

## 2025-05-16 NOTE — PROGRESS NOTES
"IM progress note      Khushbu Bledsoe  [id number]  3/17/1934     LOS: 0 days     Attending: Julian Mcgee MD    Primary Care Provider: Ana Ross MD      Chief Complaint/Reason for visit:  No chief complaint on file.      Subjective   Feels ok. Good pain control.   No f/c/n/vom/sob.   H/H drop noted. PRBCs ordered.       Objective        Visit Vitals  /54   Pulse 73   Temp 97.9 °F (36.6 °C) (Oral)   Resp 18   Ht 152.4 cm (60\")   Wt 60.8 kg (134 lb)   SpO2 90%   BMI 26.17 kg/m²     Temp (24hrs), Av.1 °F (36.7 °C), Min:97.8 °F (36.6 °C), Max:98.5 °F (36.9 °C)      Intake/Output:    Intake/Output Summary (Last 24 hours) at 2025 0953  Last data filed at 2025 0900  Gross per 24 hour   Intake 580 ml   Output 300 ml   Net 280 ml        Physical Therapy:     Signed         Goal Outcome Evaluation:  Plan of Care Reviewed With: patient  Progress: improving  Outcome Evaluation: Pt continues to present with decreased functional mobility and strength deficits limiting independence. Pt ambulated 35ft with min A and RW for support. Continue to progress per pt tolerance.     Anticipated Discharge Disposition (PT): inpatient rehabilitation facility                 Physical Exam:     General Appearance:    Alert, cooperative, in no acute distress   Head:    Normocephalic, without obvious abnormality, atraumatic    Lungs:     Normal effort, symmetric chest rise,  clear to      auscultation bilaterally              Heart:    Regular rhythm and normal rate, normal S1 and S2    Abdomen:     Normal bowel sounds, no masses, no organomegaly, soft        nontender, nondistended, no guarding, no rebound                tenderness   Extremities:   CDI hip dressing.  Flexion and dorsiflexion intact bilateral feet.    No clubbing, cyanosis or edema.  No deformities.    Pulses:   Pulses palpable and equal bilaterally   Skin:   No bleeding, bruising or rash          Results Review:     I reviewed the patient's new " clinical results.   Labs pending at time of note.     Results from last 7 days   Lab Units 05/16/25  0741 05/15/25  1021   WBC 10*3/mm3  --  10.58   HEMOGLOBIN g/dL 6.3* 7.0*   HEMATOCRIT % 20.5* 22.4*   PLATELETS 10*3/mm3  --  224     Results from last 7 days   Lab Units 05/16/25  0741 05/15/25  1021   SODIUM mmol/L 139 137   POTASSIUM mmol/L 4.6 4.9   CHLORIDE mmol/L 108* 104   CO2 mmol/L 23.0 20.0*   BUN mg/dL 46* 58*   CREATININE mg/dL 1.41* 1.83*   CALCIUM mg/dL 8.2 8.1*   GLUCOSE mg/dL 119* 186*     I reviewed the patient's new imaging including images and reports.    All medications reviewed.   acetaminophen, 1,000 mg, Oral, Q8H  apixaban, 2.5 mg, Oral, Q12H  [Held by provider] bumetanide, 1 mg, Oral, Daily  carvedilol, 6.25 mg, Oral, BID With Meals  gabapentin, 100 mg, Oral, TID  hydrALAZINE, 50 mg, Oral, TID  ipratropium, 0.5 mg, Nebulization, TID - RT  lisinopril, 40 mg, Oral, QAM  NIFEdipine XL, 30 mg, Oral, Daily  senna-docusate sodium, 2 tablet, Oral, BID      albuterol, 2.5 mg, Q6H PRN  polyethylene glycol, 17 g, Daily PRN   And  bisacodyl, 5 mg, Daily PRN   And  bisacodyl, 10 mg, Daily PRN  HYDROmorphone, 0.5 mg, Q2H PRN   And  naloxone, 0.1 mg, Q5 Min PRN  labetalol, 10 mg, Q4H PRN  ondansetron, 4 mg, Q6H PRN  oxyCODONE, 10 mg, Q4H PRN  oxyCODONE, 5 mg, Q4H PRN  sodium chloride, 500 mL, TID PRN  traMADol, 50 mg, Q12H PRN        Assessment & Plan       Status post total replacement of right hip    AF (paroxysmal atrial fibrillation)    Essential hypertension    CKD (chronic kidney disease) stage 4, GFR 15-29 ml/min    Arthritis of right hip    CHF (congestive heart failure)    Elevated hemoglobin A1c    ABLA, on chronic anemia.        Plan   1. PT/OT- WBAT RLE  2. Pain control-prns       3. IS-encourage  4. DVT proph-Mechs/Eliquis at prophylactic dose for 1 week, then resume home dose  5. Bowel regimen  6. Resume home medications as appropriate  7. Monitor post-op labs  8. DC planning for rehab       HTN, CHF, afib  - Continue home hydralazine, lisinopril, nifedipine and Coreg  -Hold spironolactone and Bumex for now  - Monitor BP   - Holding parameters for BP meds  - Labetalol PRN for SBP>170     CKD  - Avoid nephrotoxic agents     ABLA  - Transfuse , monitor h/h.    D/w pt and RN.    Ninoska Mendez MD  05/16/25  09:53 EDT

## 2025-05-16 NOTE — CASE MANAGEMENT/SOCIAL WORK
Continued Stay Note  [facility]     Patient Name: Khushbu Bledsoe  MRN: [id number]  Today's Date: 5/16/2025    Admit Date: 5/14/2025    Plan: Skilled rehab at [facility], pending insurance approval   Discharge Plan       Row Name 05/16/25 1119       Plan    Plan Skilled rehab at [facility], pending insurance approval    Patient/Family in Agreement with Plan yes    Plan Comments Met with patient at the bedside to discuss discharge plan. Patient's plan is skilled rehab at [facility], pending insurance approval. Precertification initiated today. CM will continue to follow and assist with discharge planning.    Final Discharge Disposition Code 03 - skilled nursing facility (SNF)                   Discharge Codes    No documentation.                       David Rodgers RN

## 2025-05-16 NOTE — DISCHARGE PLACEMENT REQUEST
"Khushbu Bledsoe (89 y.o. Female)       Date of Birth   1934    Social Security Number       Address   [address] [place] [address]    Home Phone   [phone]    MRN   [id number]       Synagogue   [employer]    Marital Status                               Admission Date   2025    Admission Type   Elective    Admitting Provider   Julian Mcgee MD    Attending Provider   Julian Mcgee MD    Department, Room/Bed   [facility] 3G, S359/1       Discharge Date       Discharge Disposition       Discharge Destination                                 Attending Provider: Julian Mcgee MD    Allergies: No Known Allergies    Isolation: None   Infection: None   Code Status: No CPR    Ht: 152.4 cm (60\")   Wt: 60.8 kg (134 lb)    Admission Cmt: None   Principal Problem: Status post total replacement of right hip [Z96.641]                   Active Insurance as of 2025       Primary Coverage       Payor Plan Insurance Group Employer/Plan Group    [employer] MEDICARE REPLACEMENT [employer] Medicare Advantage [employer] PPO [id number]       Payor Plan Address Payor Plan Phone Number Payor Plan Fax Number Effective Dates    [address] [address]   2024 - None Entered    [facility] [id number]         Subscriber Name Subscriber Birth Date Member ID       KHUSHBU BLEDSOE 3/17/1934 [id number]                     Emergency Contacts        (Rel.) Home Phone Work Phone Mobile Phone    CELIA BLEDSOE (Son) [phone] -- [phone]    Amee Bledsoe (Daughter) [phone] -- [phone]    [place] (Son) [phone] -- [phone]                 History & Physical        Monica Banks APRN at 25 1415       Attestation signed by Ninoska Mendez MD at 25 1939      I have reviewed this documentation and agree.                      Patient Name: Khushbu Bledsoe  MRN: [id number]  : 3/17/1934  DOS: 2025    Attending: Julian Mcgee, " MD    Primary Care Provider: Ana Ross MD      Chief complaint:  Right hip pain    Subjective  Patient is a pleasant 89 y.o. female presented for scheduled surgery by Dr. Mcgee. She underwent right total hip arthroplasty under spinal anesthesia.  She tolerated surgery well and was admitted for further medical management.  Her hip has been painful for several years.  She uses a walker with ambulation.  She denies recent falls.    When seen in PACU she is doing well.  Her pain is well-controlled.  She denies nausea, shortness of breath or chest pain.  No history of DVT or PE.    She has a history of hypertension, congestive heart failure and atrial fibrillation.  She is on chronic Eliquis.  She is followed by Dr. Mesa and had preop cardiac clearance.    Allergies:  No Known Allergies    Meds:  Medications Prior to Admission   Medication Sig Dispense Refill Last Dose/Taking    albuterol sulfate  (90 Base) MCG/ACT inhaler Inhale 2 puffs Every 4 (Four) Hours As Needed for Wheezing. 18 g 0 Past Month    bumetanide (BUMEX) 1 MG tablet Take 1 tablet by mouth Daily.   5/14/2025 at  5:30 AM    carvedilol (COREG) 6.25 MG tablet Take 1 tablet by mouth 2 (Two) Times a Day With Meals. 180 tablet 1 5/14/2025 at  5:30 AM    Cholecalciferol 125 MCG (5000 UT) tablet Take 1 tablet by mouth Daily.   Past Week    gabapentin (NEURONTIN) 100 MG capsule Take 1 capsule by mouth 3 (Three) Times a Day.   5/13/2025 Morning    hydrALAZINE (APRESOLINE) 50 MG tablet Take 1 tablet by mouth 3 (Three) Times a Day. 270 tablet 1 5/14/2025 at  5:30 AM    ipratropium (ATROVENT HFA) 17 MCG/ACT inhaler Inhale 2 puffs 4 (Four) Times a Day. 12.9 g 11 Past Week    lisinopril (PRINIVIL,ZESTRIL) 40 MG tablet Take 1 tablet by mouth Every Morning. 90 tablet 1 5/13/2025 Morning    Multiple Vitamins-Minerals (PRESERVISION AREDS 2 PO) Take 1 capsule by mouth 2 (Two) Times a Day.   Past Week    multivitamin (Multi-Vitamin Daily) tablet tablet  Take 1 tablet by mouth Daily.   Past Week    NIFEdipine XL (PROCARDIA XL) 30 MG 24 hr tablet Take 1 tablet by mouth Daily. 90 tablet 1 5/14/2025 at  5:30 AM    Omega-3 Fatty Acids (fish oil) 1200 MG capsule capsule Take 1 capsule by mouth 2 (Two) Times a Day With Meals.   Past Week    pantoprazole (PROTONIX) 40 MG EC tablet Take 1 tablet by mouth Every Morning Before Breakfast. 90 tablet 1 5/13/2025 Morning    spironolactone (ALDACTONE) 12.5 MG tablet half tablet Take 1 half tablet by mouth Daily.   5/14/2025 at  5:30 AM    acetaminophen (TYLENOL) 500 MG tablet Take 2 tablets by mouth every 8 hours 84 tablet 0     apixaban (Eliquis) 2.5 MG tablet tablet Take 1 tablet by mouth Every 12 (Twelve) Hours for 7 days. Then resume home dose. 14 tablet 0     cefadroxil (DURICEF) 500 MG capsule Take 1 capsule by mouth every 12 hours for 3 days 6 capsule 0     Cholecalciferol (Vitamin D3) 50 MCG (2000 UT) tablet Take 1 tablet by mouth Daily. 60 tablet 0     docusate sodium (Colace) 100 MG capsule Take 1-2 capsules by mouth Daily As Needed. 60 capsule 2     Eliquis 2.5 MG tablet tablet Take 1 tablet by mouth Every 12 (Twelve) Hours. (Patient taking differently: Take 1 tablet by mouth Every 12 (Twelve) Hours. Patient instructed to hold 72hours prior to hip surgery.) 180 tablet 3 5/10/2025    ondansetron (ZOFRAN) 4 MG tablet Take 1 tablet by mouth Every 6 (Six) Hours As Needed. 28 tablet 0     oxyCODONE (ROXICODONE) 5 MG immediate release tablet Take 1 tablet by mouth Every 4 (Four) to 6 (Six) Hours As Needed. 60 tablet 0     traMADol (ULTRAM) 50 MG tablet Take 1-2 tablets by mouth Every 6 (Six) Hours. 64 tablet 0     Tranexamic Acid 650 MG tablet Take 3 tablets by mouth once daily BEGINNING the day after surgery 12 tablet 0          History:   Past Medical History:   Diagnosis Date    AF (paroxysmal atrial fibrillation) 09/10/2024    Arthritis 2010    Cervical disc disorder 2010    CHF (congestive heart failure)     CKD (chronic  "kidney disease) stage 4, GFR 15-29 ml/min 02/06/2025    Congenital heart disease     Coronary artery disease     History of shingles 05/2024    Hypertension     Low back pain 2010    Lumbosacral disc disease 2010    Thoracic disc disorder 2010     Past Surgical History:   Procedure Laterality Date    BACK SURGERY  2010    CATARACT EXTRACTION Bilateral     COLONOSCOPY      JOINT REPLACEMENT  5/14/2025    RETINAL DETACHMENT REPAIR Left     SPINAL FUSION  2010     Family History   Problem Relation Age of Onset    Heart attack Brother     Cancer Son     Arthritis Son     Heart disease Son      Social History     Tobacco Use    Smoking status: Never    Smokeless tobacco: Never   Vaping Use    Vaping status: Never Used   Substance Use Topics    Alcohol use: Not Currently    Drug use: Never   She lives with her son.  She has 2 children.  She is a retired .    Review of Systems  All systems were reviewed and negative except for:  Respiratory: positive for  shortness of air  Gastrointestinal: positive for  stool incontinence    Vital Signs  /58 (Patient Position: Lying)   Pulse 60   Temp 97.4 °F (36.3 °C) (Axillary)   Resp 10   Ht 152.4 cm (60\")   Wt 60.8 kg (134 lb)   SpO2 98%   BMI 26.17 kg/m²     Physical Exam:    General Appearance:    Alert, cooperative, in no acute distress   Head:    Normocephalic, without obvious abnormality, atraumatic   Eyes:            Lids and lashes normal, conjunctivae and sclerae normal, no   icterus, no pallor, corneas clear,    Ears:    Ears appear intact with no abnormalities noted   Throat:   No oral lesions, no thrush, oral mucosa moist   Neck:   No adenopathy, supple, trachea midline, no thyromegaly    Lungs:     Clear to auscultation,respirations regular, even and unlabored    Heart:    Regular rhythm and normal rate, normal S1 and S2   Abdomen:     Normal bowel sounds, no masses, no organomegaly, soft nontender, nondistended, no guarding, no rebound  tenderness "   Genitalia:    Deferred   Extremities: Right hip dressing CDI   Pulses:   Pulses palpable and equal bilaterally   Skin:   No bleeding, bruising or rash   Neurologic:   Cranial nerves 2 - 12 grossly intact. Flexion and dorsiflexion intact bilateral feet.        I reviewed the patient's new clinical results.       Lab Results   Component Value Date    HGBA1C 6.50 (H) 05/07/2025      Latest Reference Range & Units 05/07/25 13:29   Sodium 136 - 145 mmol/L 136   Potassium 3.5 - 5.2 mmol/L 4.1   Chloride 98 - 107 mmol/L 99   CO2 22.0 - 29.0 mmol/L 23.0   Anion Gap 5.0 - 15.0 mmol/L 14.0   BUN 8 - 23 mg/dL 65 (H)   Creatinine 0.57 - 1.00 mg/dL 1.84 (H)   BUN/Creatinine Ratio 7.0 - 25.0  35.3 (H)   eGFR >60.0 mL/min/1.73 25.6 (L)   Glucose 65 - 99 mg/dL 198 (H)   Calcium 8.2 - 9.6 mg/dL 9.5   Alkaline Phosphatase 39 - 117 U/L 179 (H)   Total Protein 6.0 - 8.5 g/dL 7.2   Albumin 3.5 - 5.2 g/dL 4.3   Globulin gm/dL 2.9   A/G Ratio g/dL 1.5   AST (SGOT) 1 - 32 U/L 22   ALT (SGPT) 1 - 33 U/L 21   Total Bilirubin 0.0 - 1.2 mg/dL 0.2   Fructosamine 0 - 285 umol/L 327 (H)   Hemoglobin A1C 4.80 - 5.60 % 6.50 (H)   C-Reactive Protein 0.00 - 0.50 mg/dL 0.85 (H)   25 Hydroxy, Vitamin D 30.0 - 100.0 ng/ml 77.8   Protime 12.2 - 15.3 Seconds 15.9 (H)   INR 0.89 - 1.12  1.19 (H)   PTT 22.0 - 39.0 seconds 47.2 (H)   WBC 3.40 - 10.80 10*3/mm3 8.49   RBC 3.77 - 5.28 10*6/mm3 4.05   Hemoglobin 12.0 - 15.9 g/dL 10.9 (L)   Hematocrit 34.0 - 46.6 % 35.3   Platelets 140 - 450 10*3/mm3 332   RDW 12.3 - 15.4 % 14.6   MCV 79.0 - 97.0 fL 87.2   MCH 26.6 - 33.0 pg 26.9   MCHC 31.5 - 35.7 g/dL 30.9 (L)   MPV 6.0 - 12.0 fL 8.8   (H): Data is abnormally high  (L): Data is abnormally low    Assessment and Plan:     Status post total replacement of right hip    Arthritis of right hip    AF (paroxysmal atrial fibrillation)    Essential hypertension    CKD (chronic kidney disease) stage 4, GFR 15-29 ml/min    CHF (congestive heart failure)    Elevated  hemoglobin A1c      Plan  1. PT/OT- WBAT RLE  2. Pain control-prns   3. IS-encourage  4. DVT proph- Mechs/Eliquis at prophylactic dose for 1 week, then resume home dose  5. Bowel regimen  6. Resume home medications as appropriate  7. Monitor post-op labs  8. DC planning for rehab    HTN, CHF, afib  - Continue home hydralazine, lisinopril, nifedipine and Coreg  -Hold spironolactone and Bumex for now  - Monitor BP   - Holding parameters for BP meds  - Labetalol PRN for SBP>170    CKD  - Avoid nephrotoxic agents    Elevated hemoglobin A1c: In prediabetic range  - Explained to patient implication of A1C elevation, need to modify diet and increase activity, and f/u with PCP.      ALEXUS Osorio  05/14/25  14:18 EDT    Electronically signed by Ninoska Mendez MD at 05/14/25 1939       Evan De Santiago APRN at 05/14/25 0672       Attestation signed by Julian Mcgee MD at 05/14/25 3534      I have reviewed this documentation and agree.                        Pre-Op H&P  Khushbu Bledsoe  [id number]  3/17/1934      Chief complaint: Right Hip Pain      Subjective:  Patient is a 89 y.o.female presents for scheduled surgery by Dr. Mcgee. She anticipates a TOTAL HIP ARTHROPLASTY ANTERIOR - Right today.  The patient has had right hip pain that has progressively worsened over the past few years.  She endorses having pain with movement and sometimes when sitting.  She denies having pain when lying flat.  Up to this point, conservative treatment methods have failed to alleviate her symptoms.  She does use a walker to ambulate.    The last dose of Eliquis was on 5/10/25.    Review of Systems:  Constitutional-- No fever, chills or sweats. No fatigue.  CV-- No chest pain, palpitation or syncope. +HTN. + A-fib.  Resp-- No SOB, cough, hemoptysis  Skin--No rashes or lesions      Allergies: No Known Allergies      Home Meds:  Medications Prior to Admission   Medication Sig Dispense Refill Last Dose/Taking    albuterol  sulfate  (90 Base) MCG/ACT inhaler Inhale 2 puffs Every 4 (Four) Hours As Needed for Wheezing. 18 g 0 Past Month    bumetanide (BUMEX) 1 MG tablet Take 1 tablet by mouth Daily.   5/14/2025 at  5:30 AM    carvedilol (COREG) 6.25 MG tablet Take 1 tablet by mouth 2 (Two) Times a Day With Meals. 180 tablet 1 5/14/2025 at  5:30 AM    Cholecalciferol 125 MCG (5000 UT) tablet Take 1 tablet by mouth Daily.   Past Week    gabapentin (NEURONTIN) 100 MG capsule Take 1 capsule by mouth 3 (Three) Times a Day.   5/13/2025 Morning    hydrALAZINE (APRESOLINE) 50 MG tablet Take 1 tablet by mouth 3 (Three) Times a Day. 270 tablet 1 5/14/2025 at  5:30 AM    ipratropium (ATROVENT HFA) 17 MCG/ACT inhaler Inhale 2 puffs 4 (Four) Times a Day. 12.9 g 11 Past Week    lisinopril (PRINIVIL,ZESTRIL) 40 MG tablet Take 1 tablet by mouth Every Morning. 90 tablet 1 5/13/2025 Morning    Multiple Vitamins-Minerals (PRESERVISION AREDS 2 PO) Take 1 capsule by mouth 2 (Two) Times a Day.   Past Week    multivitamin (Multi-Vitamin Daily) tablet tablet Take 1 tablet by mouth Daily.   Past Week    NIFEdipine XL (PROCARDIA XL) 30 MG 24 hr tablet Take 1 tablet by mouth Daily. 90 tablet 1 5/14/2025 at  5:30 AM    Omega-3 Fatty Acids (fish oil) 1200 MG capsule capsule Take 1 capsule by mouth 2 (Two) Times a Day With Meals.   Past Week    pantoprazole (PROTONIX) 40 MG EC tablet Take 1 tablet by mouth Every Morning Before Breakfast. 90 tablet 1 5/13/2025 Morning    spironolactone (ALDACTONE) 12.5 MG tablet half tablet Take 1 half tablet by mouth Daily.   5/14/2025 at  5:30 AM    acetaminophen (TYLENOL) 500 MG tablet Take 2 tablets by mouth every 8 hours 84 tablet 0     apixaban (Eliquis) 2.5 MG tablet tablet Take 1 tablet by mouth Every 12 (Twelve) Hours for 7 days. Then resume home dose. 14 tablet 0     cefadroxil (DURICEF) 500 MG capsule Take 1 capsule by mouth every 12 hours for 3 days 6 capsule 0     Cholecalciferol (Vitamin D3) 50 MCG (2000 UT)  tablet Take 1 tablet by mouth Daily. 60 tablet 0     docusate sodium (Colace) 100 MG capsule Take 1-2 capsules by mouth Daily As Needed. 60 capsule 2     Eliquis 2.5 MG tablet tablet Take 1 tablet by mouth Every 12 (Twelve) Hours. (Patient taking differently: Take 1 tablet by mouth Every 12 (Twelve) Hours. Patient instructed to hold 72hours prior to hip surgery.) 180 tablet 3 5/10/2025    ondansetron (ZOFRAN) 4 MG tablet Take 1 tablet by mouth Every 6 (Six) Hours As Needed. 28 tablet 0     oxyCODONE (ROXICODONE) 5 MG immediate release tablet Take 1 tablet by mouth Every 4 (Four) to 6 (Six) Hours As Needed. 60 tablet 0     traMADol (ULTRAM) 50 MG tablet Take 1-2 tablets by mouth Every 6 (Six) Hours. 64 tablet 0     Tranexamic Acid 650 MG tablet Take 3 tablets by mouth once daily BEGINNING the day after surgery 12 tablet 0          PMH:   Past Medical History:   Diagnosis Date    AF (paroxysmal atrial fibrillation) 09/10/2024    Arthritis 2010    Cervical disc disorder 2010    CHF (congestive heart failure)     CKD (chronic kidney disease) stage 4, GFR 15-29 ml/min 02/06/2025    Congenital heart disease     Coronary artery disease     History of shingles 05/2024    Hypertension     Low back pain 2010    Lumbosacral disc disease 2010    Thoracic disc disorder 2010     PSH:    Past Surgical History:   Procedure Laterality Date    BACK SURGERY  2010    CATARACT EXTRACTION Bilateral     COLONOSCOPY      JOINT REPLACEMENT  5/14/2025    RETINAL DETACHMENT REPAIR Left     SPINAL FUSION  2010       Immunization History:  Influenza: Yes  Pneumococcal: Yes  Tetanus: No  Covid : x2    Social History:   Tobacco:   Social History     Tobacco Use   Smoking Status Never   Smokeless Tobacco Never      Alcohol:     Social History     Substance and Sexual Activity   Alcohol Use Not Currently         Physical Exam:/62 (BP Location: Right arm, Patient Position: Lying)   Pulse 63   Temp 97.9 °F (36.6 °C) (Temporal)   Resp 16    "Ht 152.4 cm (60\")   Wt 60.8 kg (134 lb)   SpO2 96%   BMI 26.17 kg/m²       General Appearance:    Alert, cooperative, no distress, appears stated age   Head:    Normocephalic, without obvious abnormality, atraumatic   Lungs:     Clear to auscultation bilaterally, respirations unlabored    Heart:   Regular rate and rhythm, S1 and S2 normal    Abdomen:    Soft without tenderness   Extremities:   Extremities normal, atraumatic, no cyanosis or edema   Skin:   Skin color, texture, turgor normal, no rashes or lesions   Neurologic:   Grossly intact     Results Review:     LABS:  Lab Results   Component Value Date    WBC 8.49 05/07/2025    HGB 10.9 (L) 05/07/2025    HCT 35.3 05/07/2025    MCV 87.2 05/07/2025     05/07/2025    NEUTROABS 4.39 05/07/2025    GLUCOSE 198 (H) 05/07/2025    BUN 65 (H) 05/07/2025    CREATININE 1.84 (H) 05/07/2025     05/07/2025    K 4.1 05/07/2025    CL 99 05/07/2025    CO2 23.0 05/07/2025    MG 2.4 06/15/2024    CALCIUM 9.5 05/07/2025    ALBUMIN 4.3 05/07/2025    AST 22 05/07/2025    ALT 21 05/07/2025    BILITOT 0.2 05/07/2025       RADIOLOGY:  Imaging Results (Last 72 Hours)       ** No results found for the last 72 hours. **            I reviewed the patient's new clinical results.    Cancer Staging (if applicable)  Cancer Patient: __ yes _x_no __unknown; If yes, clinical stage T:__ N:__M:__, stage group or __N/A      Impression: Arthritis of right hip      Plan: TOTAL HIP ARTHROPLASTY ANTERIOR - Right       Evan De Santiago, ALEXUS   5/14/2025   08:38 EDT    Electronically signed by Julian Mcgee MD at 05/14/25 0925       H&P signed by [employer], [employer] at 05/14/25 0742         [Media Unavailable] Scan on 5/14/2025 0730 by [employer], [employer]: H&P RIGHT HIP PAIN OV, BGO, 04/24/2025          Electronically signed by [employer], [employer] at 05/14/25 0700          Physician Progress Notes (most recent note)        Ninoska Mendez MD at 05/16/25 0927          IM progress " "note      Khushbu Bledsoe  [id number]  3/17/1934     LOS: 0 days     Attending: Julian Mcgee MD    Primary Care Provider: Ana Ross MD      Chief Complaint/Reason for visit:  No chief complaint on file.      Subjective   Feels ok. Good pain control.   No f/c/n/vom/sob.   H/H drop noted. PRBCs ordered.       Objective        Visit Vitals  /54   Pulse 73   Temp 97.9 °F (36.6 °C) (Oral)   Resp 18   Ht 152.4 cm (60\")   Wt 60.8 kg (134 lb)   SpO2 90%   BMI 26.17 kg/m²     Temp (24hrs), Av.1 °F (36.7 °C), Min:97.8 °F (36.6 °C), Max:98.5 °F (36.9 °C)      Intake/Output:    Intake/Output Summary (Last 24 hours) at 2025 0953  Last data filed at 2025 0900  Gross per 24 hour   Intake 580 ml   Output 300 ml   Net 280 ml        Physical Therapy:     Signed         Goal Outcome Evaluation:  Plan of Care Reviewed With: patient  Progress: improving  Outcome Evaluation: Pt continues to present with decreased functional mobility and strength deficits limiting independence. Pt ambulated 35ft with min A and RW for support. Continue to progress per pt tolerance.     Anticipated Discharge Disposition (PT): inpatient rehabilitation facility                 Physical Exam:     General Appearance:    Alert, cooperative, in no acute distress   Head:    Normocephalic, without obvious abnormality, atraumatic    Lungs:     Normal effort, symmetric chest rise,  clear to      auscultation bilaterally              Heart:    Regular rhythm and normal rate, normal S1 and S2    Abdomen:     Normal bowel sounds, no masses, no organomegaly, soft        nontender, nondistended, no guarding, no rebound                tenderness   Extremities:   CDI hip dressing.  Flexion and dorsiflexion intact bilateral feet.    No clubbing, cyanosis or edema.  No deformities.    Pulses:   Pulses palpable and equal bilaterally   Skin:   No bleeding, bruising or rash          Results Review:     I reviewed the patient's new clinical " results.   Labs pending at time of note.     Results from last 7 days   Lab Units 05/16/25  0741 05/15/25  1021   WBC 10*3/mm3  --  10.58   HEMOGLOBIN g/dL 6.3* 7.0*   HEMATOCRIT % 20.5* 22.4*   PLATELETS 10*3/mm3  --  224     Results from last 7 days   Lab Units 05/16/25  0741 05/15/25  1021   SODIUM mmol/L 139 137   POTASSIUM mmol/L 4.6 4.9   CHLORIDE mmol/L 108* 104   CO2 mmol/L 23.0 20.0*   BUN mg/dL 46* 58*   CREATININE mg/dL 1.41* 1.83*   CALCIUM mg/dL 8.2 8.1*   GLUCOSE mg/dL 119* 186*     I reviewed the patient's new imaging including images and reports.    All medications reviewed.   acetaminophen, 1,000 mg, Oral, Q8H  apixaban, 2.5 mg, Oral, Q12H  [Held by provider] bumetanide, 1 mg, Oral, Daily  carvedilol, 6.25 mg, Oral, BID With Meals  gabapentin, 100 mg, Oral, TID  hydrALAZINE, 50 mg, Oral, TID  ipratropium, 0.5 mg, Nebulization, TID - RT  lisinopril, 40 mg, Oral, QAM  NIFEdipine XL, 30 mg, Oral, Daily  senna-docusate sodium, 2 tablet, Oral, BID      albuterol, 2.5 mg, Q6H PRN  polyethylene glycol, 17 g, Daily PRN   And  bisacodyl, 5 mg, Daily PRN   And  bisacodyl, 10 mg, Daily PRN  HYDROmorphone, 0.5 mg, Q2H PRN   And  naloxone, 0.1 mg, Q5 Min PRN  labetalol, 10 mg, Q4H PRN  ondansetron, 4 mg, Q6H PRN  oxyCODONE, 10 mg, Q4H PRN  oxyCODONE, 5 mg, Q4H PRN  sodium chloride, 500 mL, TID PRN  traMADol, 50 mg, Q12H PRN        Assessment & Plan       Status post total replacement of right hip    AF (paroxysmal atrial fibrillation)    Essential hypertension    CKD (chronic kidney disease) stage 4, GFR 15-29 ml/min    Arthritis of right hip    CHF (congestive heart failure)    Elevated hemoglobin A1c    ABLA, on chronic anemia.        Plan   1. PT/OT- WBAT RLE  2. Pain control-prns       3. IS-encourage  4. DVT proph-Mechs/Eliquis at prophylactic dose for 1 week, then resume home dose  5. Bowel regimen  6. Resume home medications as appropriate  7. Monitor post-op labs  8. DC planning for rehab      HTN, CHF,  afib  - Continue home hydralazine, lisinopril, nifedipine and Coreg  -Hold spironolactone and Bumex for now  - Monitor BP   - Holding parameters for BP meds  - Labetalol PRN for SBP>170     CKD  - Avoid nephrotoxic agents     ABLA  - Transfuse , monitor h/h.    D/w pt and RN.    Ninoska Mendez MD  25  09:53 EDT      Electronically signed by Ninoska Mendez MD at 25 0954          Physical Therapy Notes (most recent note)        Pamella Hui, PT at 25 1042  Version 1 of 1         Patient Name: Khushbu Bledsoe  : 3/17/1934    MRN: [id number]                              Today's Date: 2025       Admit Date: 2025    Visit Dx: No diagnosis found.  Patient Active Problem List   Diagnosis    AF (paroxysmal atrial fibrillation)    Acute on chronic diastolic CHF (congestive heart failure)    Essential hypertension    Nonrheumatic mitral valve regurgitation    Lumbar radiculopathy    Age-related physical debility    Loss of balance    Lumbar canal stenosis    Difficulty breathing    CKD (chronic kidney disease) stage 4, GFR 15-29 ml/min    Arthritis of right hip    Hyperglycemia    Status post total replacement of right hip    CHF (congestive heart failure)    Elevated hemoglobin A1c     Past Medical History:   Diagnosis Date    AF (paroxysmal atrial fibrillation) 09/10/2024    Arthritis 2010    Cervical disc disorder 2010    CHF (congestive heart failure)     CKD (chronic kidney disease) stage 4, GFR 15-29 ml/min 2025    Congenital heart disease     Coronary artery disease     History of shingles 2024    Hypertension     Low back pain 2010    Lumbosacral disc disease 2010    Thoracic disc disorder 2010     Past Surgical History:   Procedure Laterality Date    BACK SURGERY  2010    CATARACT EXTRACTION Bilateral     COLONOSCOPY      JOINT REPLACEMENT  2025    RETINAL DETACHMENT REPAIR Left     SPINAL FUSION  2010    TOTAL HIP ARTHROPLASTY Right 2025    Procedure:  TOTAL HIP ARTHROPLASTY ANTERIOR RIGHT;  Surgeon: Julian Mcgee MD;  Location: [facility];  Service: Orthopedics;  Laterality: Right;      General Information       [facility] Name 05/16/25 1114          Physical Therapy Time and Intention    Document Type therapy note (daily note)  -     Mode of Treatment physical therapy  -[facility] Name 05/16/25 1114          General Information    Patient Profile Reviewed yes  -     Existing Precautions/Restrictions fall;hip, anterior;other (see comments)  RLE WBAT, low H&H  -     Barriers to Rehab previous functional deficit  -[facility] Name 05/16/25 1114          Cognition    Orientation Status (Cognition) oriented x 3  -[facility] Name 05/16/25 1114          Safety Issues/Impairments Affecting Functional Mobility    Safety Issues Affecting Function (Mobility) awareness of need for assistance;insight into deficits/self-awareness;safety precaution awareness;safety precautions follow-through/compliance  -     Impairments Affecting Function (Mobility) pain;strength;range of motion (ROM);balance;endurance/activity tolerance  -               User Key  (r) = Recorded By, (t) = Taken By, (c) = Cosigned By      Initials Name Provider Type     aPmella Hui, PT Physical Therapist                   Mobility       [facility] Name 05/16/25 1115          Bed Mobility    Comment, (Bed Mobility) UIC pre/post tx  -[facility] Name 05/16/25 1115          Transfers    Comment, (Transfers) VCs for hand placement and sequencing w/ FWW. Cues to step out RLE prior to transfers for comfort. Pt denies prolonged dizziness w/ mobility  -[facility] Name 05/16/25 1115          Sit-Stand Transfer    Sit-Stand [place] (Transfers) contact guard;1 person assist;verbal cues  -     Assistive Device (Sit-Stand Transfers) walker, front-wheeled  -     Comment, (Sit-Stand Transfer) STS from chair and toilet  -       Row Name 05/16/25 1115          Gait/Stairs (Locomotion)    [place]  Level (Gait) minimum assist (75% patient effort);1 person assist;verbal cues  -     Assistive Device (Gait) walker, front-wheeled  -     Patient was able to Ambulate yes  -     Distance in Feet (Gait) 110  -     Deviations/Abnormal Patterns (Gait) right sided deviations;gait speed decreased;stride length decreased;weight shifting decreased;antalgic  -     Bilateral Gait Deviations forward flexed posture  -     Right Sided Gait Deviations weight shift ability decreased  -     Comment, (Gait/Stairs) Pt demo step through gait pattern at decreased speed w/ decreased step length. VCs for upright posture, increased heel strike, increased step length, and increased weight acceptance through RLE. No LOB or knee buckling. Distance limited by pain and fatigue. Denies dizziness throughout  -       Row Name 05/16/25 1115          Mobility    Extremity Weight-bearing Status right lower extremity  -     Right Lower Extremity (Weight-bearing Status) weight-bearing as tolerated (WBAT)  -               User Key  (r) = Recorded By, (t) = Taken By, (c) = Cosigned By      Initials Name Provider Type     Pamella Hui, PT Physical Therapist                   Obj/Interventions       Row Name 05/16/25 1117          Motor Skills    Therapeutic Exercise hip;knee;ankle  -       Row Name 05/16/25 1117          Hip (Therapeutic Exercise)    Hip (Therapeutic Exercise) isometric exercises;strengthening exercise  -     Hip Isometrics (Therapeutic Exercise) bilateral;gluteal sets;10 repetitions  -     Hip Strengthening (Therapeutic Exercise) right;aBduction;heel slides;10 repetitions  -       Row Name 05/16/25 1117          Knee (Therapeutic Exercise)    Knee (Therapeutic Exercise) isometric exercises;strengthening exercise  -     Knee Isometrics (Therapeutic Exercise) bilateral;quad sets;10 repetitions  -     Knee Strengthening (Therapeutic Exercise) right;LAQ (long arc quad);10 repetitions  -       Row Name  05/16/25 1117          Ankle (Therapeutic Exercise)    Ankle (Therapeutic Exercise) AROM (active range of motion)  [facility]     Ankle AROM (Therapeutic Exercise) bilateral;dorsiflexion;plantarflexion;10 repetitions  -       Row Name 05/16/25 1117          Balance    Balance Assessment sitting static balance;sitting dynamic balance;standing static balance;standing dynamic balance  -     Static Sitting Balance standby assist  -     Dynamic Sitting Balance contact guard;verbal cues  -     Position, Sitting Balance unsupported;sitting in chair  -     Static Standing Balance contact guard  -     Dynamic Standing Balance minimal assist;verbal cues  -     Position/Device Used, Standing Balance supported;walker, front-wheeled  -     Balance Interventions sitting;standing;sit to stand;supported;dynamic;static  -               User Key  (r) = Recorded By, (t) = Taken By, (c) = Cosigned By      Initials Name Provider Type     Pamella Hui, [place] Physical Therapist                   Goals/Plan    No documentation.                  Clinical Impression       [facility] Name 05/16/25 1119          Pain    Pain Location hip  -     Pain Side/Orientation right  -     Pain Management Interventions activity modification encouraged;exercise or physical activity utilized;positioning techniques utilized;other (see comments)  pt declined cold pack  -     Response to Pain Interventions activity participation with tolerable pain  -Count includes the [facility] Name 05/16/25 1119          Pain Scale: FACES Pre/Post-Treatment    Pain: FACES Scale, Pretreatment 2-->hurts little bit  -     Posttreatment Pain Rating 2-->hurts little bit  -     Pre/Posttreatment Pain Comment reports increased R hip pain to 6/10 during ther ex  -Count includes the [facility] Name 05/16/25 1119          Plan of Care Review    Plan of Care Reviewed With patient  -     Progress improving  -     Outcome Evaluation Pt continues to present below baseline function d/t deficits in RLE  strength, balance, and activity tolerance. Pt ambulated 110 ft w/ FWW, [place]. No LOB or knee buckling noted. Mobility limited by pain and fatigue this date. Pt would continue to benefit from IP PT services while hospitalized. Recommend IRF at d/c for best functional outcome.  -       Row Name 05/16/25 1119          Vital Signs    Pre Systolic BP Rehab 162  -LH     Pre Treatment Diastolic BP 66  -LH     Post Systolic BP Rehab 173  -LH     Post Treatment Diastolic BP 79  -LH     Pre Patient Position Sitting  -     Post Patient Position Sitting  -       Row Name 05/16/25 1119          Positioning and Restraints    Pre-Treatment Position sitting in chair/recliner  -LH     Post Treatment Position chair  -LH     In Chair notified nsg;reclined;sitting;call light within reach;encouraged to call for assist;exit alarm on;waffle cushion;legs elevated;compression device;heels elevated;with other staff  -               User Key  (r) = Recorded By, (t) = Taken By, (c) = Cosigned By      Initials Name Provider Type     Pamella Hui, PT Physical Therapist                   Outcome Measures       Row Name 05/16/25 1121          How much help from another person do you currently need...    Turning from your back to your side while in flat bed without using bedrails? 3  -LH     Moving from lying on back to sitting on the side of a flat bed without bedrails? 3  -LH     Moving to and from a bed to a chair (including a wheelchair)? 3  -LH     Standing up from a chair using your arms (e.g., wheelchair, bedside chair)? 3  -LH     Climbing 3-5 steps with a railing? 2  -LH     To walk in hospital room? 3  -     AM-PAC 6 Clicks Score (PT) 17  -     Highest Level of Mobility Goal Stand (1 or More Minutes)-5  -       Row Name 05/16/25 1121 05/16/25 0838       Functional Assessment    Outcome Measure Options AM-PAC 6 Clicks Basic Mobility (PT)  - AM-PAC 6 Clicks Daily Activity (OT)  -              User Key  (r) = Recorded  By, (t) = Taken By, (c) = Cosigned By      Initials Name Provider Type     Pamella Hui, PT Physical Therapist    AJ Wilma Bruno, OT Occupational Therapist                                 Physical Therapy Education       Title: PT OT SLP Therapies (In Progress)       Topic: Physical Therapy (Done)       Point: Mobility training (Done)       Learning Progress Summary            Patient Acceptance, E, VU,DU,NR by  at 5/16/2025 1121    Acceptance, E, VU by AE at 5/15/2025 1301    Acceptance, E, VU by AE at 5/15/2025 0812    Eager, E,D,H,TB, VU by SC at 5/14/2025 1819    Comment: reviewed HEP                      Point: Home exercise program (Done)       Learning Progress Summary            Patient Acceptance, E, VU,DU,NR by  at 5/16/2025 1121    Acceptance, E, VU by AE at 5/15/2025 1301    Acceptance, E, VU by AE at 5/15/2025 0812    Eager, E,D,H,TB, VU by SC at 5/14/2025 1819    Comment: reviewed HEP                      Point: Body mechanics (Done)       Learning Progress Summary            Patient Acceptance, E, VU,DU,NR by  at 5/16/2025 1121    Acceptance, E, VU by AE at 5/15/2025 1301    Acceptance, E, VU by AE at 5/15/2025 0812    Eager, E,D,H,TB, VU by SC at 5/14/2025 1819    Comment: reviewed HEP                      Point: Precautions (Done)       Learning Progress Summary            Patient Acceptance, E, VU,DU,NR by  at 5/16/2025 1121    Acceptance, E, VU by AE at 5/15/2025 1301    Acceptance, E, VU by AE at 5/15/2025 0812    Eager, E,D,H,TB, VU by SC at 5/14/2025 1819    Comment: reviewed HEP                                      User Key       Initials Effective Dates Name Provider Type Discipline    SC 02/03/23 -  Yanni Clark, PT Physical Therapist PT     09/21/21 -  Dean Bautista, PT Physical Therapist PT     09/21/23 -  Pamella Hui, PT Physical Therapist PT                  PT Recommendation and Plan     Progress: improving  Outcome Evaluation: Pt continues to present  below baseline function d/t deficits in RLE strength, balance, and activity tolerance. Pt ambulated 110 ft w/ FWW, [place]. No LOB or knee buckling noted. Mobility limited by pain and fatigue this date. Pt would continue to benefit from IP PT services while hospitalized. Recommend IRF at d/c for best functional outcome.     Time Calculation:         PT Charges       Row Name 25 1121             Time Calculation    Start Time 1042  -LH      PT Received On 25  -      PT Goal Re-Cert Due Date 25  -         Timed Charges    37395 - PT Therapeutic Exercise Minutes 12  -LH      65733 - Gait Training Minutes  12  -LH         Total Minutes    Timed Charges Total Minutes 24  -LH       Total Minutes 24  -                User Key  (r) = Recorded By, (t) = Taken By, (c) = Cosigned By      Initials Name Provider Type     Pamella Hui, PT Physical Therapist                  Therapy Charges for Today       Code Description Service Date Service Provider Modifiers Qty    12989631713 HC PT THER PROC EA 15 MIN 2025 Pamella Hui, PT GP 1    55095891695 HC GAIT TRAINING EA 15 MIN 2025 Pamella Hui, PT GP 1            PT G-Codes  Outcome Measure Options: AM-PAC 6 Clicks Basic Mobility (PT)  AM-PAC 6 Clicks Score (PT): 17  AM-PAC 6 Clicks Score (OT): 21  PT Discharge Summary  Anticipated Discharge Disposition (PT): inpatient rehabilitation facility    Pamella Hui PT  2025      Electronically signed by Pamella Hui PT at 25 1122          Occupational Therapy Notes (most recent note)        Wilma Bruno, OT at 25 6968          Patient Name: Khushbu Bledsoe  : 3/17/1934    MRN: [id number]                              Today's Date: 2025       Admit Date: 2025    Visit Dx: No diagnosis found.  Patient Active Problem List   Diagnosis    AF (paroxysmal atrial fibrillation)    Acute on chronic diastolic CHF (congestive heart failure)    Essential hypertension     Nonrheumatic mitral valve regurgitation    Lumbar radiculopathy    Age-related physical debility    Loss of balance    Lumbar canal stenosis    Difficulty breathing    CKD (chronic kidney disease) stage 4, GFR 15-29 ml/min    Arthritis of right hip    Hyperglycemia    Status post total replacement of right hip    CHF (congestive heart failure)    Elevated hemoglobin A1c     Past Medical History:   Diagnosis Date    AF (paroxysmal atrial fibrillation) 09/10/2024    Arthritis 2010    Cervical disc disorder 2010    CHF (congestive heart failure)     CKD (chronic kidney disease) stage 4, GFR 15-29 ml/min 02/06/2025    Congenital heart disease     Coronary artery disease     History of shingles 05/2024    Hypertension     Low back pain 2010    Lumbosacral disc disease 2010    Thoracic disc disorder 2010     Past Surgical History:   Procedure Laterality Date    BACK SURGERY  2010    CATARACT EXTRACTION Bilateral     COLONOSCOPY      JOINT REPLACEMENT  5/14/2025    RETINAL DETACHMENT REPAIR Left     SPINAL FUSION  2010    TOTAL HIP ARTHROPLASTY Right 5/14/2025    Procedure: TOTAL HIP ARTHROPLASTY ANTERIOR RIGHT;  Surgeon: Julian Mcgee MD;  Location: [facility];  Service: Orthopedics;  Laterality: Right;      General Information       Row Name 05/16/25 0830          OT Time and Intention    Document Type therapy note (daily note)  -     Mode of Treatment occupational therapy  -       Row Name 05/16/25 0830          General Information    Patient Profile Reviewed yes  -AJ     Existing Precautions/Restrictions fall;hip, anterior;other (see comments)  RLE WBAT, low H&H  -AJ       Row Name 05/16/25 0830          Cognition    Orientation Status (Cognition) oriented x 3  -       Row Name 05/16/25 0830          Safety Issues/Impairments Affecting Functional Mobility    Safety Issues Affecting Function (Mobility) awareness of need for assistance;positioning of assistive device;safety precautions  follow-through/compliance;sequencing abilities;insight into deficits/self-awareness  -     Impairments Affecting Function (Mobility) pain;strength;range of motion (ROM);balance;endurance/activity tolerance  -               User Key  (r) = Recorded By, (t) = Taken By, (c) = Cosigned By      Initials Name Provider Type    AJ Wilma Bruno OT Occupational Therapist                     Mobility/ADL's       Row Name 05/16/25 0831          Bed Mobility    Bed Mobility supine-sit;scooting/bridging  -     Scooting/Bridging [place] (Bed Mobility) standby assist;1 person assist  -     Supine-Sit [place] (Bed Mobility) contact guard;1 person assist;verbal cues  Cues for sequencing  -     Bed Mobility, Safety Issues decreased use of legs for bridging/pushing  -     Assistive Device (Bed Mobility) head of bed elevated;bed rails  -     Comment, (Bed Mobility) Cues for sequencing. Increased time to transition RLE to EOB.  -       Row Name 05/16/25 0831          Transfers    Transfers sit-stand transfer;toilet transfer;stand-sit transfer  -       Row Name 05/16/25 0831          Sit-Stand Transfer    Sit-Stand [place] (Transfers) contact guard;1 person assist  -     Assistive Device (Sit-Stand Transfers) walker, front-wheeled  -     Comment, (Sit-Stand Transfer) No cues or physical assist required  -       Row Name 05/16/25 0831          Stand-Sit Transfer    Stand-Sit [place] (Transfers) contact guard;1 person assist  -     Assistive Device (Stand-Sit Transfers) walker, front-wheeled  -     Comment, (Stand-Sit Transfer) Cues for alignment and eccentric lowering  -       Row Name 05/16/25 0831          Toilet Transfer    Type (Toilet Transfer) sit-stand;stand-sit  -     [place] Level (Toilet Transfer) contact guard;1 person assist  -     Assistive Device (Toilet Transfer) commode;grab bars/safety frame;raised toilet seat;walker, front-wheeled  -       Row Name 05/16/25  0831          Functional Mobility    Functional Mobility- Ind. Level contact guard assist;1 person  -     Functional Mobility- Device walker, front-wheeled  -     Functional Mobility-Distance (Feet) --  <HH distance  -       Row Name 05/16/25 0831          Activities of Daily Living    BADL Assessment/Intervention toileting;grooming  -       Row Name 05/16/25 0831          Hygiene Care    Oral Care teeth brushed - regular toothbrush  -       Row Name 05/16/25 0831          Mobility    Extremity Weight-bearing Status right lower extremity  -     Right Lower Extremity (Weight-bearing Status) weight-bearing as tolerated (WBAT)  -       Row Name 05/16/25 0831          Grooming Assessment/Training    [place] Level (Grooming) oral care regimen;wash face, hands;standby assist  -     Position (Grooming) sink side;supported standing  -[facility] Name 05/16/25 0831          Toileting Assessment/Training    [place] Level (Toileting) adjust/manage clothing;perform perineal hygiene;standby assist  -     Assistive Devices (Toileting) commode;grab bar/safety frame  -     Position (Toileting) unsupported sitting  -               User Key  (r) = Recorded By, (t) = Taken By, (c) = Cosigned By      Initials Name Provider Type    AJ Wilma Bruno OT Occupational Therapist                   Obj/Interventions       Row Name 05/16/25 0835          Balance    Balance Assessment sitting static balance;sitting dynamic balance;sit to stand dynamic balance;standing static balance;standing dynamic balance  -     Static Sitting Balance supervision  -     Dynamic Sitting Balance standby assist  -     Position, Sitting Balance unsupported;sitting edge of bed  -     Static Standing Balance contact guard  -     Dynamic Standing Balance contact guard  -     Position/Device Used, Standing Balance supported;walker, front-wheeled  -     Balance Interventions sitting;standing;sit to  stand;supported;static;dynamic;occupation based/functional task  -               User Key  (r) = Recorded By, (t) = Taken By, (c) = Cosigned By      Initials Name Provider Type    Wilma Vizcarra OT Occupational Therapist                   Goals/Plan    No documentation.                  Clinical Impression       Row Name 05/16/25 0835          Pain Assessment    Pretreatment Pain Rating 0/10 - no pain  -     Posttreatment Pain Rating 4/10  -     Pain Location hip  -     Pain Side/Orientation right  -     Pain Management Interventions activity modification encouraged;exercise or physical activity utilized;nursing notified;positioning techniques utilized;cold applied  -     Response to Pain Interventions activity participation with tolerable pain  -       Row Name 05/16/25 08          Plan of Care Review    Plan of Care Reviewed With patient  -     Progress improving  -     Outcome Evaluation Pt continues to progress with mobility and ADL independence, however is limited by pain, balance, and decreased activity tolerance. Pt would benefit from ongoing skilled OT services to progress to PLOF. Rec d/c to home with assist.  -       Row Name 05/16/25 08          Therapy Plan Review/Discharge Plan (OT)    Anticipated Discharge Disposition (OT) home with assist  -[facility] Name 05/16/25 08          Vital Signs    Pre Systolic BP Rehab 140  -AJ     Pre Treatment Diastolic BP 54  -AJ     Pre SpO2 (%) 94  -AJ     O2 Delivery Pre Treatment room air  -AJ     Post SpO2 (%) 96  -AJ     O2 Delivery Post Treatment room air  -AJ     Pre Patient Position Supine  -AJ     Intra Patient Position Standing  -AJ     Post Patient Position Sitting  -       Row Name 05/16/25 0835          Positioning and Restraints    Pre-Treatment Position in bed  -     Post Treatment Position chair  -AJ     In Chair notified nsg;reclined;sitting;call light within reach;encouraged to call for assist;exit alarm on;legs  elevated;waffle cushion;with nsg  -               User Key  (r) = Recorded By, (t) = Taken By, (c) = Cosigned By      Initials Name Provider Type    Wilma Vizcarra OT Occupational Therapist                   Outcome Measures       Row Name 05/16/25 0838          How much help from another is currently needed...    Putting on and taking off regular lower body clothing? 3  -AJ     Bathing (including washing, rinsing, and drying) 3  -AJ     Toileting (which includes using toilet bed pan or urinal) 3  -AJ     Putting on and taking off regular upper body clothing 4  -AJ     Taking care of personal grooming (such as brushing teeth) 4  -AJ     Eating meals 4  -AJ     AM-PAC 6 Clicks Score (OT) 21  -       Row Name 05/16/25 0838          Functional Assessment    Outcome Measure Options AM-PAC 6 Clicks Daily Activity (OT)  -               User Key  (r) = Recorded By, (t) = Taken By, (c) = Cosigned By      Initials Name Provider Type    Wilma Vizcarra OT Occupational Therapist                    Occupational Therapy Education       Title: PT OT SLP Therapies (In Progress)       Topic: Occupational Therapy (In Progress)       Point: ADL training (Done)       Learning Progress Summary            Patient Acceptance, E,D, VU,DU by JAYDEN at 5/16/2025 0839    Acceptance, E,D, NR by DANAY at 5/15/2025 1303                      Point: Precautions (Done)       Learning Progress Summary            Patient Acceptance, E,D, VU,DU by JAYDEN at 5/16/2025 0839    Acceptance, E,D, NR by DANAY at 5/15/2025 1303                      Point: Body mechanics (Done)       Learning Progress Summary            Patient Acceptance, E,D, VU,DU by JAYDEN at 5/16/2025 0839    Acceptance, E,D, NR by DANAY at 5/15/2025 1303                                      User Key       Initials Effective Dates Name Provider Type Narda JON 06/16/21 -  Joi Szymanski OT Occupational Therapist OT    JAYDEN 08/26/24 -  Wilma Bruno OT Occupational Therapist OT                   OT Recommendation and Plan     Plan of Care Review  Plan of Care Reviewed With: patient  Progress: improving  Outcome Evaluation: Pt continues to progress with mobility and ADL independence, however is limited by pain, balance, and decreased activity tolerance. Pt would benefit from ongoing skilled OT services to progress to PLOF. Rec d/c to home with assist.     Time Calculation:         Time Calculation- OT       Row Name 05/16/25 0839             Time Calculation- OT    OT Start Time 0745  -AJ      OT Received On 05/16/25  -AJ      OT Goal Re-Cert Due Date 05/25/25  -         Timed Charges    09654 - OT Therapeutic Activity Minutes 5  -AJ      17345 - OT Self Care/Mgmt Minutes 23  -AJ         Total Minutes    Timed Charges Total Minutes 28  -AJ       Total Minutes 28  -AJ                User Key  (r) = Recorded By, (t) = Taken By, (c) = Cosigned By      Initials Name Provider Type     Wilma Bruno OT Occupational Therapist                  Therapy Charges for Today       Code Description Service Date Service Provider Modifiers Qty    68907376993 HC OT SELF CARE/MGMT/TRAIN EA 15 MIN 5/16/2025 Wilma Bruno OT GO 2                 Wilma Bruno OT  5/16/2025    Electronically signed by Wilma Bruno OT at 05/16/25 0840

## 2025-05-16 NOTE — THERAPY TREATMENT NOTE
Patient Name: Khushbu Bledsoe  : 3/17/1934    MRN: [id number]                              Today's Date: 2025       Admit Date: 2025    Visit Dx: No diagnosis found.  Patient Active Problem List   Diagnosis    AF (paroxysmal atrial fibrillation)    Acute on chronic diastolic CHF (congestive heart failure)    Essential hypertension    Nonrheumatic mitral valve regurgitation    Lumbar radiculopathy    Age-related physical debility    Loss of balance    Lumbar canal stenosis    Difficulty breathing    CKD (chronic kidney disease) stage 4, GFR 15-29 ml/min    Arthritis of right hip    Hyperglycemia    Status post total replacement of right hip    CHF (congestive heart failure)    Elevated hemoglobin A1c     Past Medical History:   Diagnosis Date    AF (paroxysmal atrial fibrillation) 09/10/2024    Arthritis 2010    Cervical disc disorder 2010    CHF (congestive heart failure)     CKD (chronic kidney disease) stage 4, GFR 15-29 ml/min 2025    Congenital heart disease     Coronary artery disease     History of shingles 2024    Hypertension     Low back pain     Lumbosacral disc disease 2010    Thoracic disc disorder      Past Surgical History:   Procedure Laterality Date    BACK SURGERY  2010    CATARACT EXTRACTION Bilateral     COLONOSCOPY      JOINT REPLACEMENT  2025    RETINAL DETACHMENT REPAIR Left     SPINAL FUSION  2010    TOTAL HIP ARTHROPLASTY Right 2025    Procedure: TOTAL HIP ARTHROPLASTY ANTERIOR RIGHT;  Surgeon: Julian Mcgee MD;  Location: [facility];  Service: Orthopedics;  Laterality: Right;      General Information       Row Name 25 0830          OT Time and Intention    Document Type therapy note (daily note)  -AJ     Mode of Treatment occupational therapy  -AJ       Row Name 25 0830          General Information    Patient Profile Reviewed yes  -AJ     Existing Precautions/Restrictions fall;hip, anterior;other (see comments)  RLE WBAT, low H&H  -AJ        Row Name 05/16/25 0830          Cognition    Orientation Status (Cognition) oriented x 3  -       Row Name 05/16/25 0830          Safety Issues/Impairments Affecting Functional Mobility    Safety Issues Affecting Function (Mobility) awareness of need for assistance;positioning of assistive device;safety precautions follow-through/compliance;sequencing abilities;insight into deficits/self-awareness  -     Impairments Affecting Function (Mobility) pain;strength;range of motion (ROM);balance;endurance/activity tolerance  -               User Key  (r) = Recorded By, (t) = Taken By, (c) = Cosigned By      Initials Name Provider Type    AJ Wilma Bruno OT Occupational Therapist                     Mobility/ADL's       Row Name 05/16/25 0831          Bed Mobility    Bed Mobility supine-sit;scooting/bridging  -     Scooting/Bridging [place] (Bed Mobility) standby assist;1 person assist  -     Supine-Sit [place] (Bed Mobility) contact guard;1 person assist;verbal cues  Cues for sequencing  -     Bed Mobility, Safety Issues decreased use of legs for bridging/pushing  -     Assistive Device (Bed Mobility) head of bed elevated;bed rails  -     Comment, (Bed Mobility) Cues for sequencing. Increased time to transition RLE to EOB.  -       Row Name 05/16/25 0831          Transfers    Transfers sit-stand transfer;toilet transfer;stand-sit transfer  -       Row Name 05/16/25 0831          Sit-Stand Transfer    Sit-Stand [place] (Transfers) contact guard;1 person assist  -     Assistive Device (Sit-Stand Transfers) walker, front-wheeled  -AJ     Comment, (Sit-Stand Transfer) No cues or physical assist required  -       Row Name 05/16/25 0831          Stand-Sit Transfer    Stand-Sit [place] (Transfers) contact guard;1 person assist  -     Assistive Device (Stand-Sit Transfers) walker, front-wheeled  -AJ     Comment, (Stand-Sit Transfer) Cues for alignment and eccentric lowering   -       Row Name 05/16/25 0831          Toilet Transfer    Type (Toilet Transfer) sit-stand;stand-sit  -     [place] Level (Toilet Transfer) contact guard;1 person assist  -     Assistive Device (Toilet Transfer) commode;grab bars/safety frame;raised toilet seat;walker, front-wheeled  -[facility] Name 05/16/25 0831          Functional Mobility    Functional Mobility- Ind. Level contact guard assist;1 person  -     Functional Mobility- Device walker, front-wheeled  -     Functional Mobility-Distance (Feet) --  <HH distance  -[facility] Name 05/16/25 0831          Activities of Daily Living    BADL Assessment/Intervention toileting;grooming  -[facility] Name 05/16/25 0831          Hygiene Care    Oral Care teeth brushed - regular toothbrush  -[facility] Name 05/16/25 0831          Mobility    Extremity Weight-bearing Status right lower extremity  -     Right Lower Extremity (Weight-bearing Status) weight-bearing as tolerated (WBAT)  -[facility] Name 05/16/25 0831          Grooming Assessment/Training    [place] Level (Grooming) oral care regimen;wash face, hands;standby assist  -     Position (Grooming) sink side;supported standing  -[facility] Name 05/16/25 0831          Toileting Assessment/Training    [place] Level (Toileting) adjust/manage clothing;perform perineal hygiene;standby assist  -     Assistive Devices (Toileting) commode;grab bar/safety frame  -     Position (Toileting) unsupported sitting  -               User Key  (r) = Recorded By, (t) = Taken By, (c) = Cosigned By      Initials Name Provider Type    Wilma Vizcarra OT Occupational Therapist                   Obj/Interventions       Row Name 05/16/25 0835          Balance    Balance Assessment sitting static balance;sitting dynamic balance;sit to stand dynamic balance;standing static balance;standing dynamic balance  -     Static Sitting Balance supervision  -     Dynamic Sitting Balance standby  assist  -AJ     Position, Sitting Balance unsupported;sitting edge of bed  -AJ     Static Standing Balance contact guard  -AJ     Dynamic Standing Balance contact guard  -AJ     Position/Device Used, Standing Balance supported;walker, front-wheeled  -AJ     Balance Interventions sitting;standing;sit to stand;supported;static;dynamic;occupation based/functional task  -AJ               User Key  (r) = Recorded By, (t) = Taken By, (c) = Cosigned By      Initials Name Provider Type    Wilma Vizcarra, OT Occupational Therapist                   Goals/Plan    No documentation.                  Clinical Impression       Row Name 05/16/25 0835          Pain Assessment    Pretreatment Pain Rating 0/10 - no pain  -AJ     Posttreatment Pain Rating 4/10  -AJ     Pain Location hip  -AJ     Pain Side/Orientation right  -     Pain Management Interventions activity modification encouraged;exercise or physical activity utilized;nursing notified;positioning techniques utilized;cold applied  -     Response to Pain Interventions activity participation with tolerable pain  -AJ       Row Name 05/16/25 0835          Plan of Care Review    Plan of Care Reviewed With patient  -     Progress improving  -     Outcome Evaluation Pt continues to progress with mobility and ADL independence, however is limited by pain, balance, and decreased activity tolerance. Pt would benefit from ongoing skilled OT services to progress to PLOF. Rec d/c to home with assist.  -       Row Name 05/16/25 0835          Therapy Plan Review/Discharge Plan (OT)    Anticipated Discharge Disposition (OT) home with assist  -       Row Name 05/16/25 0835          Vital Signs    Pre Systolic BP Rehab 140  -AJ     Pre Treatment Diastolic BP 54  -AJ     Pre SpO2 (%) 94  -AJ     O2 Delivery Pre Treatment room air  -AJ     Post SpO2 (%) 96  -AJ     O2 Delivery Post Treatment room air  -AJ     Pre Patient Position Supine  -AJ     Intra Patient Position Standing   -     Post Patient Position Sitting  -       Row Name 05/16/25 0835          Positioning and Restraints    Pre-Treatment Position in bed  -     Post Treatment Position chair  -AJ     In Chair notified nsg;reclined;sitting;call light within reach;encouraged to call for assist;exit alarm on;legs elevated;waffle cushion;with nsg  -               User Key  (r) = Recorded By, (t) = Taken By, (c) = Cosigned By      Initials Name Provider Type    Wilma Vizcarra OT Occupational Therapist                   Outcome Measures       Row Name 05/16/25 0838          How much help from another is currently needed...    Putting on and taking off regular lower body clothing? 3  -AJ     Bathing (including washing, rinsing, and drying) 3  -AJ     Toileting (which includes using toilet bed pan or urinal) 3  -AJ     Putting on and taking off regular upper body clothing 4  -AJ     Taking care of personal grooming (such as brushing teeth) 4  -AJ     Eating meals 4  -AJ     AM-PAC 6 Clicks Score (OT) 21  -       Row Name 05/16/25 0838          Functional Assessment    Outcome Measure Options AM-PAC 6 Clicks Daily Activity (OT)  -               User Key  (r) = Recorded By, (t) = Taken By, (c) = Cosigned By      Initials Name Provider Type    Wilma Vizcarra OT Occupational Therapist                    Occupational Therapy Education       Title: PT OT SLP Therapies (In Progress)       Topic: Occupational Therapy (In Progress)       Point: ADL training (Done)       Learning Progress Summary            Patient Acceptance, E,D, VU,DU by JAYDEN at 5/16/2025 0839    Acceptance, E,D, NR by DANAY at 5/15/2025 1303                      Point: Precautions (Done)       Learning Progress Summary            Patient Acceptance, E,D, VU,DU by JAYDEN at 5/16/2025 0839    Acceptance, E,D, NR by DANAY at 5/15/2025 1303                      Point: Body mechanics (Done)       Learning Progress Summary            Patient Acceptance, E,D, VU,DU by JAYDEN at  5/16/2025 0839    Acceptance, E,D, NR by DANAY at 5/15/2025 1303                                      User Key       Initials Effective Dates Name Provider Type Discipline    DANAY 06/16/21 -  Joi Szymanski OT Occupational Therapist OT    JAYDEN 08/26/24 -  Wilma Bruon OT Occupational Therapist OT                  OT Recommendation and Plan     Plan of Care Review  Plan of Care Reviewed With: patient  Progress: improving  Outcome Evaluation: Pt continues to progress with mobility and ADL independence, however is limited by pain, balance, and decreased activity tolerance. Pt would benefit from ongoing skilled OT services to progress to PLOF. Rec d/c to home with assist.     Time Calculation:         Time Calculation- OT       Row Name 05/16/25 0839             Time Calculation- OT    OT Start Time 0745  -AJ      OT Received On 05/16/25  -      OT Goal Re-Cert Due Date 05/25/25  -         Timed Charges    88204 - OT Therapeutic Activity Minutes 5  -AJ      71914 - OT Self Care/Mgmt Minutes 23  -AJ         Total Minutes    Timed Charges Total Minutes 28  -AJ       Total Minutes 28  -AJ                User Key  (r) = Recorded By, (t) = Taken By, (c) = Cosigned By      Initials Name Provider Type    AJ Wilma Bruno OT Occupational Therapist                  Therapy Charges for Today       Code Description Service Date Service Provider Modifiers Qty    99488123700 HC OT SELF CARE/MGMT/TRAIN EA 15 MIN 5/16/2025 Wilma Bruno OT GO 2                 Wilma Bruno OT  5/16/2025

## 2025-05-17 LAB
BH BB BLOOD EXPIRATION DATE: NORMAL
BH BB BLOOD EXPIRATION DATE: NORMAL
BH BB BLOOD TYPE BARCODE: 5100
BH BB BLOOD TYPE BARCODE: 5100
BH BB DISPENSE STATUS: NORMAL
BH BB DISPENSE STATUS: NORMAL
BH BB PRODUCT CODE: NORMAL
BH BB PRODUCT CODE: NORMAL
BH BB UNIT NUMBER: NORMAL
BH BB UNIT NUMBER: NORMAL
CROSSMATCH INTERPRETATION: NORMAL
CROSSMATCH INTERPRETATION: NORMAL
UNIT  ABO: NORMAL
UNIT  ABO: NORMAL
UNIT  RH: NORMAL
UNIT  RH: NORMAL

## 2025-05-17 PROCEDURE — A9270 NON-COVERED ITEM OR SERVICE: HCPCS | Performed by: INTERNAL MEDICINE

## 2025-05-17 PROCEDURE — 63710000001 BISACODYL 10 MG SUPPOSITORY: Performed by: INTERNAL MEDICINE

## 2025-05-17 PROCEDURE — 63710000001 LISINOPRIL 40 MG TABLET: Performed by: INTERNAL MEDICINE

## 2025-05-17 PROCEDURE — 63710000001 NIFEDIPINE XL 30 MG TABLET SUSTAINED-RELEASE 24 HOUR: Performed by: INTERNAL MEDICINE

## 2025-05-17 PROCEDURE — 97116 GAIT TRAINING THERAPY: CPT

## 2025-05-17 PROCEDURE — A9270 NON-COVERED ITEM OR SERVICE: HCPCS | Performed by: ORTHOPAEDIC SURGERY

## 2025-05-17 PROCEDURE — 63710000001 TRAMADOL 50 MG TABLET: Performed by: INTERNAL MEDICINE

## 2025-05-17 PROCEDURE — A9270 NON-COVERED ITEM OR SERVICE: HCPCS | Performed by: NURSE PRACTITIONER

## 2025-05-17 PROCEDURE — 63710000001 TRAMADOL 50 MG TABLET: Performed by: ORTHOPAEDIC SURGERY

## 2025-05-17 PROCEDURE — 97110 THERAPEUTIC EXERCISES: CPT

## 2025-05-17 PROCEDURE — 94761 N-INVAS EAR/PLS OXIMETRY MLT: CPT

## 2025-05-17 PROCEDURE — 63710000001 APIXABAN 2.5 MG TABLET: Performed by: NURSE PRACTITIONER

## 2025-05-17 PROCEDURE — 94799 UNLISTED PULMONARY SVC/PX: CPT

## 2025-05-17 PROCEDURE — 63710000001 GABAPENTIN 100 MG CAPSULE: Performed by: INTERNAL MEDICINE

## 2025-05-17 PROCEDURE — 63710000001 HYDRALAZINE 50 MG TABLET: Performed by: INTERNAL MEDICINE

## 2025-05-17 PROCEDURE — 63710000001 CARVEDILOL 6.25 MG TABLET: Performed by: INTERNAL MEDICINE

## 2025-05-17 PROCEDURE — 63710000001 ACETAMINOPHEN EXTRA STRENGTH 500 MG TABLET: Performed by: ORTHOPAEDIC SURGERY

## 2025-05-17 RX ORDER — TRAMADOL HYDROCHLORIDE 50 MG/1
50 TABLET ORAL EVERY 6 HOURS PRN
Status: DISCONTINUED | OUTPATIENT
Start: 2025-05-17 | End: 2025-05-18 | Stop reason: HOSPADM

## 2025-05-17 RX ADMIN — NIFEDIPINE 30 MG: 30 TABLET, FILM COATED, EXTENDED RELEASE ORAL at 09:23

## 2025-05-17 RX ADMIN — ACETAMINOPHEN 1000 MG: 500 TABLET ORAL at 20:31

## 2025-05-17 RX ADMIN — CARVEDILOL 6.25 MG: 6.25 TABLET, FILM COATED ORAL at 17:45

## 2025-05-17 RX ADMIN — GABAPENTIN 100 MG: 100 CAPSULE ORAL at 16:27

## 2025-05-17 RX ADMIN — APIXABAN 2.5 MG: 2.5 TABLET, FILM COATED ORAL at 20:32

## 2025-05-17 RX ADMIN — GABAPENTIN 100 MG: 100 CAPSULE ORAL at 09:23

## 2025-05-17 RX ADMIN — IPRATROPIUM BROMIDE 0.5 MG: 0.5 SOLUTION RESPIRATORY (INHALATION) at 13:47

## 2025-05-17 RX ADMIN — HYDRALAZINE HYDROCHLORIDE 50 MG: 50 TABLET ORAL at 20:32

## 2025-05-17 RX ADMIN — TRAMADOL HYDROCHLORIDE 50 MG: 50 TABLET, COATED ORAL at 03:34

## 2025-05-17 RX ADMIN — HYDRALAZINE HYDROCHLORIDE 50 MG: 50 TABLET ORAL at 09:23

## 2025-05-17 RX ADMIN — CARVEDILOL 6.25 MG: 6.25 TABLET, FILM COATED ORAL at 09:23

## 2025-05-17 RX ADMIN — LISINOPRIL 40 MG: 40 TABLET ORAL at 06:16

## 2025-05-17 RX ADMIN — APIXABAN 2.5 MG: 2.5 TABLET, FILM COATED ORAL at 09:23

## 2025-05-17 RX ADMIN — IPRATROPIUM BROMIDE 0.5 MG: 0.5 SOLUTION RESPIRATORY (INHALATION) at 08:15

## 2025-05-17 RX ADMIN — ACETAMINOPHEN 1000 MG: 500 TABLET ORAL at 03:33

## 2025-05-17 RX ADMIN — HYDRALAZINE HYDROCHLORIDE 50 MG: 50 TABLET ORAL at 16:27

## 2025-05-17 RX ADMIN — ACETAMINOPHEN 1000 MG: 500 TABLET ORAL at 13:01

## 2025-05-17 RX ADMIN — TRAMADOL HYDROCHLORIDE 50 MG: 50 TABLET, COATED ORAL at 16:32

## 2025-05-17 RX ADMIN — GABAPENTIN 100 MG: 100 CAPSULE ORAL at 20:32

## 2025-05-17 RX ADMIN — BISACODYL 10 MG: 10 SUPPOSITORY RECTAL at 06:16

## 2025-05-17 NOTE — CASE MANAGEMENT/SOCIAL WORK
Case Management Discharge Note      Final Note: Pt to discharge to [facility] 3/18/2025 if medically ready. Nurse to call report to [phone]. Discharge summary to be faxed to [phone]. Nurse to set up EMS for transport and fill out PCS related to pain with positioning and unable to sit for amount of time for transport.         Selected Continued Care - Admitted Since 5/14/2025       Destination Coordination complete.      Service Provider Services Address Phone Fax Patient Preferred    [facility] Skilled Nursing [facility] [place] [address] [phone] --              Durable Medical Equipment    No services have been selected for the patient.                Dialysis/Infusion    No services have been selected for the patient.                Home Medical Care    No services have been selected for the patient.                Therapy    No services have been selected for the patient.                Community Resources    No services have been selected for the patient.                Community & DME    No services have been selected for the patient.                         Final Discharge Disposition Code: 03 - skilled nursing facility (SNF)

## 2025-05-17 NOTE — PLAN OF CARE
Goal Outcome Evaluation:  Plan of Care Reviewed With: patient        Progress: improving  Outcome Evaluation: Patient continues to be limited by 'burning' pain with weight bearing and ther ex. Improved with shorter step length this AM. Good effort with LE ther ex. Pateint currently below functional baseline, demonstrating decreased functional mobility status, impaired balance, decreased endurance, and decreased R hip strength/ROM. Will address these deficits to promote return to PLOF. Recommend IRF at d/c.    Anticipated Discharge Disposition (PT): inpatient rehabilitation facility

## 2025-05-17 NOTE — THERAPY TREATMENT NOTE
Patient Name: Khushbu Bledsoe  : 3/17/1934    MRN: [id number]                              Today's Date: 2025       Admit Date: 2025    Visit Dx: No diagnosis found.  Patient Active Problem List   Diagnosis    AF (paroxysmal atrial fibrillation)    Acute on chronic diastolic CHF (congestive heart failure)    Essential hypertension    Nonrheumatic mitral valve regurgitation    Lumbar radiculopathy    Age-related physical debility    Loss of balance    Lumbar canal stenosis    Difficulty breathing    CKD (chronic kidney disease) stage 4, GFR 15-29 ml/min    Arthritis of right hip    Hyperglycemia    Status post total replacement of right hip    CHF (congestive heart failure)    Elevated hemoglobin A1c     Past Medical History:   Diagnosis Date    AF (paroxysmal atrial fibrillation) 09/10/2024    Arthritis 2010    Cervical disc disorder 2010    CHF (congestive heart failure)     CKD (chronic kidney disease) stage 4, GFR 15-29 ml/min 2025    Congenital heart disease     Coronary artery disease     History of shingles 2024    Hypertension     Low back pain 2010    Lumbosacral disc disease 2010    Thoracic disc disorder      Past Surgical History:   Procedure Laterality Date    BACK SURGERY  2010    CATARACT EXTRACTION Bilateral     COLONOSCOPY      JOINT REPLACEMENT  2025    RETINAL DETACHMENT REPAIR Left     SPINAL FUSION  2010    TOTAL HIP ARTHROPLASTY Right 2025    Procedure: TOTAL HIP ARTHROPLASTY ANTERIOR RIGHT;  Surgeon: Julian Mcgee MD;  Location: [facility], [facility];  Service: Orthopedics;  Laterality: Right;      General Information       Row Name 25 1425 25 1040       Physical Therapy Time and Intention    Document Type therapy note (daily note)  -LR therapy note (daily note)  -LR    Mode of Treatment physical therapy;individual therapy  -LR physical therapy;individual therapy  -LR      Row Name 25 1425 25 1040       General Information    Patient Profile  Reviewed yes  -LR yes  -LR    Existing Precautions/Restrictions fall;hip, anterior  -LR fall;hip, anterior  -LR    Barriers to Rehab previous functional deficit  -LR previous functional deficit  -LR      Row Name 05/17/25 1425 05/17/25 1040       Cognition    Orientation Status (Cognition) oriented x 4  -LR oriented x 4  -LR      Row Name 05/17/25 1425 05/17/25 1040       Safety Issues/Impairments Affecting Functional Mobility    Safety Issues Affecting Function (Mobility) safety precautions follow-through/compliance;safety precaution awareness;positioning of assistive device  -LR safety precautions follow-through/compliance;safety precaution awareness;positioning of assistive device  -LR    Impairments Affecting Function (Mobility) pain;strength;range of motion (ROM);balance;endurance/activity tolerance  -LR pain;strength;range of motion (ROM);balance;endurance/activity tolerance  -LR              User Key  (r) = Recorded By, (t) = Taken By, (c) = Cosigned By      Initials Name Provider Type    LR Dorene Perales, PT Physical Therapist                   Mobility       Row Name 05/17/25 [facility] 05/17/25 1040       Bed Mobility    Bed Mobility sit-supine  -LR --    Supine-Sit [place] (Bed Mobility) not tested  -LR not tested  -LR    Sit-Supine [place] (Bed Mobility) verbal cues;contact guard  -LR not tested  -LR    Assistive Device (Bed Mobility) head of bed elevated;other (see comments)  -LR --    Comment, (Bed Mobility) Verbal cues to push from bed to scoot hips back and up into bed. CGA to support LEs.  -LR UIC on arrival and at end of treatment.  -LR      Row Name 05/17/25 [facility] 05/17/25 1040       Transfers    Comment, (Transfers) Verbal cues for correct hand placement with t/f and to step R LE ou before t/f for comfort. Verbal cues for correct technique to take steps from chair to bed and avoid pivoting/twisting on R LE.  -LR Verbal cues for correct hand placement with t/f and to step R LE out  before t/f for comfort.  -LR      Row Name 05/17/25 1425 05/17/25 1040       Bed-Chair Transfer    Bed-Chair [place] (Transfers) verbal cues;contact guard  -LR not tested  -LR    Assistive Device (Bed-Chair Transfers) walker, front-wheeled  -LR --      Row Name 05/17/25 1425 05/17/25 1040       Sit-Stand Transfer    Sit-Stand [place] (Transfers) verbal cues;contact guard  -LR verbal cues;contact guard  -LR    Assistive Device (Sit-Stand Transfers) walker, front-wheeled  -LR walker, front-wheeled  -LR      Row Name 05/17/25 1425 05/17/25 1040       Gait/Stairs (Locomotion)    [place] Level (Gait) verbal cues;contact guard  -LR verbal cues;contact guard  -LR    Assistive Device (Gait) walker, front-wheeled  -LR walker, front-wheeled  -LR    Patient was able to Ambulate yes  -LR yes  -LR    Distance in Feet (Gait) 60  -LR 50  -LR    Deviations/Abnormal Patterns (Gait) bilateral deviations;kasia decreased;gait speed decreased;stride length decreased  -LR bilateral deviations;kasia decreased;gait speed decreased;stride length decreased;right sided deviations;antalgic  -LR    Bilateral Gait Deviations forward flexed posture;heel strike decreased  -LR forward flexed posture;heel strike decreased  -LR    Right Sided Gait Deviations weight shift ability decreased  -LR weight shift ability decreased  -LR    [place] Level (Stairs) not tested  -LR --    Comment, (Gait/Stairs) Patient ambulated with step to gait pattern at slow pace. Cues for increased R LE weight bearing/stance phase, decreased UE weight bearing, and increased R knee flexion during swing phase. Improved with cues for correction. Gait became smoother this PM with increased pace. Gait limited by fatigue and pain. Chair brought up behind patient to return to sitting.  -LR Patient ambulated with step to gait pattern at slow pace with short step length. Patient reported this lessened the burning pain that has been limiting her. Verbal cues  for increased R LE weight bearing, decreased UE weight bearing, and equal step length. Cues to stay closer to RW. Gait limited by pain and fatigue. Chair brought up behind patient to return to sitting.  -LR      Row Name 05/17/25 1425 05/17/25 1040       Mobility    Extremity Weight-bearing Status right lower extremity  -LR right lower extremity  -LR    Right Lower Extremity (Weight-bearing Status) weight-bearing as tolerated (WBAT)  -LR weight-bearing as tolerated (WBAT)  -LR              User Key  (r) = Recorded By, (t) = Taken By, (c) = Cosigned By      Initials Name Provider Type    LR Dorene Perales, PT Physical Therapist                   Obj/Interventions       Row Name 05/17/25 1425 05/17/25 1040       Motor Skills    Therapeutic Exercise ankle;knee;hip;other (see comments)  cues for technique; CGA R hip abd  -LR ankle;knee;hip;other (see comments)  cues for technique; CGA R heel slides, R hip abd, unable to achieve full extension with LAQ d/t pain  -LR      Row Name 05/17/25 1425 05/17/25 1040       Hip (Therapeutic Exercise)    Hip (Therapeutic Exercise) strengthening exercise;isometric exercises  -LR strengthening exercise;isometric exercises  -LR    Hip Isometrics (Therapeutic Exercise) bilateral;gluteal sets;sitting;10 repetitions  -LR bilateral;gluteal sets;sitting;10 repetitions  -LR    Hip Strengthening (Therapeutic Exercise) bilateral;heel slides;aBduction;sitting;10 repetitions  -LR bilateral;heel slides;aBduction;sitting;10 repetitions  -LR      Row Name 05/17/25 1425 05/17/25 1040       Knee (Therapeutic Exercise)    Knee (Therapeutic Exercise) strengthening exercise;isometric exercises  -LR strengthening exercise;isometric exercises  -LR    Knee Isometrics (Therapeutic Exercise) bilateral;quad sets;sitting;10 repetitions  -LR bilateral;quad sets;sitting;10 repetitions  -LR    Knee Strengthening (Therapeutic Exercise) bilateral;SAQ (short arc quad);LAQ (long arc quad);sitting;10  repetitions;left;SLR (straight leg raise)  -LR bilateral;SAQ (short arc quad);LAQ (long arc quad);sitting;10 repetitions;left;SLR (straight leg raise)  -LR      Row Name 05/17/25 1425 05/17/25 1040       Ankle (Therapeutic Exercise)    Ankle (Therapeutic Exercise) AROM (active range of motion)  -LR AROM (active range of motion)  -LR    Ankle AROM (Therapeutic Exercise) bilateral;dorsiflexion;plantarflexion;sitting;10 repetitions  -LR bilateral;dorsiflexion;plantarflexion;sitting;10 repetitions  -LR      Row Name 05/17/25 1425 05/17/25 1040       Balance    Balance Assessment sitting static balance;sitting dynamic balance;standing static balance;standing dynamic balance  -LR sitting static balance;sitting dynamic balance;standing dynamic balance;standing static balance  -LR    Static Sitting Balance standby assist  -LR standby assist  -LR    Dynamic Sitting Balance contact guard  -LR contact guard  -LR    Position, Sitting Balance unsupported;sitting in chair;sitting edge of bed  -LR unsupported;sitting in chair  -LR    Static Standing Balance contact guard  -LR contact guard  -LR    Dynamic Standing Balance contact guard  -LR contact guard  -LR    Position/Device Used, Standing Balance supported;walker, rolling  -LR supported;walker, rolling  -LR              User Key  (r) = Recorded By, (t) = Taken By, (c) = Cosigned By      Initials Name Provider Type    LR Dorene Perales, PT Physical Therapist                   Goals/Plan       Row Name 05/17/25 1425 05/17/25 1040       Bed Mobility Goal 1 (PT)    Activity/Assistive Device (Bed Mobility Goal 1, PT) sit to supine/supine to sit  -LR sit to supine/supine to sit  -LR    [place] Level/Cues Needed (Bed Mobility Goal 1, PT) modified independence  -LR modified independence  -LR    Time Frame (Bed Mobility Goal 1, PT) long term goal (LTG);3 days  -LR long term goal (LTG);3 days  -LR    Progress/Outcomes (Bed Mobility Goal 1, PT) goal ongoing;continuing  progress toward goal  -LR goal ongoing  -LR      Row Name 05/17/25 1425 05/17/25 1040       Transfer Goal 1 (PT)    Activity/Assistive Device (Transfer Goal 1, PT) sit-to-stand/stand-to-sit  -LR sit-to-stand/stand-to-sit  -LR    [place] Level/Cues Needed (Transfer Goal 1, PT) modified independence  -LR modified independence  -LR    Time Frame (Transfer Goal 1, PT) long term goal (LTG);3 days  -LR long term goal (LTG);3 days  -LR    Progress/Outcome (Transfer Goal 1, PT) goal ongoing;continuing progress toward goal  -LR continuing progress toward goal;goal ongoing  -LR      Row Name 05/17/25 1425 05/17/25 1040       Gait Training Goal 1 (PT)    Activity/Assistive Device (Gait Training Goal 1, PT) gait (walking locomotion)  -LR gait (walking locomotion)  -LR    [place] Level (Gait Training Goal 1, PT) modified independence  -LR modified independence  -LR    Distance (Gait Training Goal 1, PT) 150 feet  - feet  -LR    Time Frame (Gait Training Goal 1, PT) long term goal (LTG);3 days  -LR long term goal (LTG);3 days  -LR    Progress/Outcome (Gait Training Goal 1, PT) continuing progress toward goal;goal ongoing  -LR continuing progress toward goal;goal ongoing  -LR              User Key  (r) = Recorded By, (t) = Taken By, (c) = Cosigned By      Initials Name Provider Type    LR Dorene Perales, PT Physical Therapist                   Clinical Impression       Row Name 05/17/25 1435 05/17/25 1040       Pain    Pretreatment Pain Rating 0/10 - no pain  -LR 0/10 - no pain  -LR    Posttreatment Pain Rating 0/10 - no pain  -LR --    Pain Location -- hip  -LR    Pain Side/Orientation right  -LR right  -LR    Pain Management Interventions exercise or physical activity utilized;movement retraining implemented  -LR exercise or physical activity utilized;movement retraining implemented  -LR    Response to Pain Interventions activity level improved;mobility function improved;functional ability  improved;activity participation with tolerable pain  -LR --    Pre/Posttreatment Pain Comment rated pain 5/10 at worst with ambulation  -LR c/o 'burning' pain with weight bearing and some ther ex  -LR    Additional Documentation -- Pain Scale: FACES Pre/Post-Treatment (Group)  -LR      Row Name 05/17/25 1040          Pain Scale: FACES Pre/Post-Treatment    Posttreatment Pain Rating 2-->hurts little bit  -LR       Row Name 05/17/25 1435 05/17/25 1040       Plan of Care Review    Plan of Care Reviewed With patient  -LR patient  -LR    Progress improving  -LR improving  -LR    Outcome Evaluation Improved gait mechanics this PM, increased gait distance slightly. Less difficulty with R heel slides this PM. Patient reported pain improved as gait progressed this PM. Patient currently below functional baseline, demonstrating decreased functional mobility status, impaired balance, decreased endurance, and decreased strength/ROM. Will address these deficits to promote return to PLOF. Recommend IRF at d/c.  -LR Patient continues to be limited by 'burning' pain with weight bearing and ther ex. Improved with shorter step length this AM. Good effort with LE ther ex. Pateint currently below functional baseline, demonstrating decreased functional mobility status, impaired balance, decreased endurance, and decreased R hip strength/ROM. Will address these deficits to promote return to PLOF. Recommend IRF at d/c.  -      Row Name 05/17/25 143 05/17/25 1040       Therapy Assessment/Plan (PT)    Rehab Potential (PT) good  -LR good  -LR    Criteria for Skilled Interventions Met (PT) yes;meets criteria;skilled treatment is necessary  -LR yes;meets criteria;skilled treatment is necessary  -LR    Therapy Frequency (PT) 2 times/day  -LR 2 times/day  -LR      Row Name 05/17/25 1435 05/17/25 1040       Positioning and Restraints    Pre-Treatment Position sitting in chair/recliner  -LR sitting in chair/recliner  -LR    Post Treatment  Position bed  -LR chair  -LR    In Bed notified nsg;supine;call light within reach;encouraged to call for assist;exit alarm on;side rails up x2  -LR --    In Chair -- notified nsg;reclined;sitting;call light within reach;encouraged to call for assist;exit alarm on;legs elevated;waffle cushion  -LR              User Key  (r) = Recorded By, (t) = Taken By, (c) = Cosigned By      Initials Name Provider Type    LR Dorene Perales, PT Physical Therapist                   Outcome Measures       Row Name 05/17/25 1425 05/17/25 1040       How much help from another person do you currently need...    Turning from your back to your side while in flat bed without using bedrails? 3  -LR 3  -LR    Moving from lying on back to sitting on the side of a flat bed without bedrails? 3  -LR 3  -LR    Moving to and from a bed to a chair (including a wheelchair)? 3  -LR 3  -LR    Standing up from a chair using your arms (e.g., wheelchair, bedside chair)? 3  -LR 3  -LR    Climbing 3-5 steps with a railing? 2  -LR 2  -LR    To walk in hospital room? 3  -LR 3  -LR    AM-PAC 6 Clicks Score (PT) 17  -LR 17  -LR    Highest Level of Mobility Goal Stand (1 or More Minutes)-5  -LR Stand (1 or More Minutes)-5  -LR      Row Name 05/17/25 0800          How much help from another person do you currently need...    Turning from your back to your side while in flat bed without using bedrails? 3  -MW     Moving from lying on back to sitting on the side of a flat bed without bedrails? 3  -MW     Moving to and from a bed to a chair (including a wheelchair)? 3  -MW     Standing up from a chair using your arms (e.g., wheelchair, bedside chair)? 3  -MW     Climbing 3-5 steps with a railing? 3  -MW     To walk in hospital room? 3  -MW     AM-PAC 6 Clicks Score (PT) 18  -MW     Highest Level of Mobility Goal Walk 10 Steps or More-6  -MW       Row Name 05/17/25 1425 05/17/25 1040       Functional Assessment    Outcome Measure Options AM-PAC 6 Clicks  Basic Mobility (PT)  -LR AM-PAC 6 Clicks Basic Mobility (PT)  -LR              User Key  (r) = Recorded By, (t) = Taken By, (c) = Cosigned By      Initials Name Provider Type    LR Dorene Perales, PT Physical Therapist    Bianca Loomis RN Registered Nurse                                 Physical Therapy Education       Title: PT OT SLP Therapies (In Progress)       Topic: Physical Therapy (Done)       Point: Mobility training (Done)       Learning Progress Summary            Patient Acceptance, E,D, VU,NR by LR at 5/17/2025 1425    Comment: Educated on anterior hip precautions, weight bearing status, HEP, correct sit to supine t/f technique, correct sit<->stand t/f technique, correct gait mechanics, and progression of POC.    Acceptance, E,D, VU,NR by LR at 5/17/2025 1040    Comment: Educated on anterior hip precautions, weight bearing status, HEP, safety with mobility, benefits of mobility, correct sit<->stand t/f technique, correct gait mechanics, and progression of POC.    Acceptance, E, VU,NR by  at 5/16/2025 1613    Acceptance, E, VU,DU,NR by  at 5/16/2025 1121    Acceptance, E, VU by AE at 5/15/2025 1301    Acceptance, E, VU by AE at 5/15/2025 0812    Eager, E,D,H,TB, VU by SC at 5/14/2025 1819    Comment: reviewed HEP                      Point: Home exercise program (Done)       Learning Progress Summary            Patient Acceptance, E,D, VU,NR by LR at 5/17/2025 1425    Comment: Educated on anterior hip precautions, weight bearing status, HEP, correct sit to supine t/f technique, correct sit<->stand t/f technique, correct gait mechanics, and progression of POC.    Acceptance, E,D, VU,NR by LR at 5/17/2025 1040    Comment: Educated on anterior hip precautions, weight bearing status, HEP, safety with mobility, benefits of mobility, correct sit<->stand t/f technique, correct gait mechanics, and progression of POC.    Acceptance, E, VU,NR by  at 5/16/2025 1613    Acceptance, E, VU,DU,NR by   at 5/16/2025 1121    Acceptance, E, VU by AE at 5/15/2025 1301    Acceptance, E, VU by AE at 5/15/2025 0812    Eager, RONNA,D,H,TB, VU by SC at 5/14/2025 1819    Comment: reviewed HEP                      Point: Body mechanics (Done)       Learning Progress Summary            Patient Acceptance, E,D, VU,NR by LR at 5/17/2025 1425    Comment: Educated on anterior hip precautions, weight bearing status, HEP, correct sit to supine t/f technique, correct sit<->stand t/f technique, correct gait mechanics, and progression of POC.    Acceptance, E,D, VU,NR by LR at 5/17/2025 1040    Comment: Educated on anterior hip precautions, weight bearing status, HEP, safety with mobility, benefits of mobility, correct sit<->stand t/f technique, correct gait mechanics, and progression of POC.    Acceptance, E, VU,NR by LH at 5/16/2025 1613    Acceptance, E, VU,DU,NR by  at 5/16/2025 1121    Acceptance, E, VU by AE at 5/15/2025 1301    Acceptance, E, VU by AE at 5/15/2025 0812    Eager, RONNA,D,H,TB, VU by SC at 5/14/2025 1819    Comment: reviewed HEP                      Point: Precautions (Done)       Learning Progress Summary            Patient Acceptance, E,D, VU,NR by LR at 5/17/2025 1425    Comment: Educated on anterior hip precautions, weight bearing status, HEP, correct sit to supine t/f technique, correct sit<->stand t/f technique, correct gait mechanics, and progression of POC.    Acceptance, E,D, VU,NR by LR at 5/17/2025 1040    Comment: Educated on anterior hip precautions, weight bearing status, HEP, safety with mobility, benefits of mobility, correct sit<->stand t/f technique, correct gait mechanics, and progression of POC.    Acceptance, E, VU,NR by LH at 5/16/2025 1613    Acceptance, E, VU,DU,NR by LH at 5/16/2025 1121    Acceptance, E, VU by AE at 5/15/2025 1301    Acceptance, E, VU by AE at 5/15/2025 0812    Eager, SAUL FRIEND,H,TB, VU by SC at 5/14/2025 7581    Comment: reviewed HEP                                      User Key        Initials Effective Dates Name Provider Type Discipline    SC 02/03/23 -  Yanni Clark, PT Physical Therapist PT    LR 02/03/23 -  Dorene Perales, PT Physical Therapist PT    AE 09/21/21 -  Dean Bautista, PT Physical Therapist PT     09/21/23 -  Pamella Hui, PT Physical Therapist PT                  PT Recommendation and Plan     Progress: improving  Outcome Evaluation: Improved gait mechanics this PM, increased gait distance slightly. Less difficulty with R heel slides this PM. Patient reported pain improved as gait progressed this PM. Patient currently below functional baseline, demonstrating decreased functional mobility status, impaired balance, decreased endurance, and decreased strength/ROM. Will address these deficits to promote return to PLOF. Recommend IRF at d/c.     Time Calculation:         PT Charges       Row Name 05/17/25 1435 05/17/25 1040          Time Calculation    Start Time 1435  -LR 1040  -LR     PT Received On 05/17/25  -LR 05/17/25  -LR     PT Goal Re-Cert Due Date 05/24/25  -LR 05/24/25  -LR        Timed Charges    12394 - PT Therapeutic Exercise Minutes 15  -LR 15  -LR     67724 - Gait Training Minutes  15  -LR 10  -LR        Total Minutes    Timed Charges Total Minutes 30  -LR 25  -LR      Total Minutes 30  -LR 25  -LR               User Key  (r) = Recorded By, (t) = Taken By, (c) = Cosigned By      Initials Name Provider Type    LR Dorene Perales, PT Physical Therapist                  Therapy Charges for Today       Code Description Service Date Service Provider Modifiers Qty    31274550057 HC PT THER PROC EA 15 MIN 5/17/2025 Dorene Perales, PT GP 1    59555859768 HC GAIT TRAINING EA 15 MIN 5/17/2025 Dorene Perales, PT GP 1    02189231016 HC PT THER PROC EA 15 MIN 5/17/2025 Dorene Perales, PT GP 1    68038769083 HC GAIT TRAINING EA 15 MIN 5/17/2025 Dorene Perales, PT GP 1            PT G-Codes  Outcome Measure Options:  AM-PAC 6 Clicks Basic Mobility (PT)  AM-PAC 6 Clicks Score (PT): 17  AM-PAC 6 Clicks Score (OT): 21  PT Discharge Summary  Anticipated Discharge Disposition (PT): inpatient rehabilitation facility    Dorene Perales, PT  5/17/2025

## 2025-05-17 NOTE — PLAN OF CARE
Goal Outcome Evaluation:  Plan of Care Reviewed With: patient        Progress: improving  Outcome Evaluation: Improved gait mechanics this PM, increased gait distance slightly. Less difficulty with R heel slides this PM. Patient reported pain improved as gait progressed this PM. Patient currently below functional baseline, demonstrating decreased functional mobility status, impaired balance, decreased endurance, and decreased strength/ROM. Will address these deficits to promote return to PLOF. Recommend IRF at d/c.    Anticipated Discharge Disposition (PT): inpatient rehabilitation facility

## 2025-05-17 NOTE — PROGRESS NOTES
"IM progress note      Khushbu Bledsoe  [id number]  3/17/1934     LOS: 0 days     Attending: Julian Mcgee MD    Primary Care Provider: Ana Ross MD      Chief Complaint/Reason for visit:  No chief complaint on file.      Subjective   Feels ok. Good pain control.   No f/c/n/vom/sob.   Good response to transfusion.      Objective        Visit Vitals  /67 (BP Location: Right arm, Patient Position: Sitting)   Pulse 86   Temp 99 °F (37.2 °C) (Oral)   Resp 18   Ht 152.4 cm (60\")   Wt 60.8 kg (134 lb)   SpO2 96%   BMI 26.17 kg/m²     Temp (24hrs), Av.8 °F (36.6 °C), Min:96.7 °F (35.9 °C), Max:99 °F (37.2 °C)      Intake/Output:    Intake/Output Summary (Last 24 hours) at 2025 1028  Last data filed at 2025 0730  Gross per 24 hour   Intake 180 ml   Output 1350 ml   Net -1170 ml        Physical Therapy:  Pamella Hui, PT   Physical Therapist  Physical Therapy     Plan of Care     Signed     Date of Service: 25 1529  Creation Time: 25 1613     Signed         Goal Outcome Evaluation:  Plan of Care Reviewed With: patient  Progress: no change  Outcome Evaluation: Pt ambulated 40 ft in PM session w/ FWW, [place]. Mobility limited by pain and fatigue. BP stable throughout session. Pt would continue to benefit from IPPT services while hospitalized to address deficits. Recommend IRF at d/c.     Anticipated Discharge Disposition (PT): inpatient rehabilitation facility                        Physical Exam:     General Appearance:    Alert, cooperative, in no acute distress   Head:    Normocephalic, without obvious abnormality, atraumatic    Lungs:     Normal effort, symmetric chest rise,  clear to      auscultation bilaterally              Heart:    Regular rhythm and normal rate, normal S1 and S2    Abdomen:     Normal bowel sounds, no masses, no organomegaly, soft        nontender, nondistended, no guarding, no rebound                tenderness   Extremities:   CDI hip " dressing.  Flexion and dorsiflexion intact bilateral feet.    No clubbing, cyanosis or edema.  No deformities.    Pulses:   Pulses palpable and equal bilaterally   Skin:   No bleeding, bruising or rash          Results Review:     I reviewed the patient's new clinical results.   Labs pending at time of note.     Results from last 7 days   Lab Units 05/16/25  1916 05/16/25  0741 05/15/25  1021   WBC 10*3/mm3 7.51  --  10.58   HEMOGLOBIN g/dL 9.8* 6.3* 7.0*   HEMATOCRIT % 30.0* 20.5* 22.4*   PLATELETS 10*3/mm3 193  --  224     Results from last 7 days   Lab Units 05/16/25  0741 05/15/25  1021   SODIUM mmol/L 139 137   POTASSIUM mmol/L 4.6 4.9   CHLORIDE mmol/L 108* 104   CO2 mmol/L 23.0 20.0*   BUN mg/dL 46* 58*   CREATININE mg/dL 1.41* 1.83*   CALCIUM mg/dL 8.2 8.1*   GLUCOSE mg/dL 119* 186*     I reviewed the patient's new imaging including images and reports.    All medications reviewed.   acetaminophen, 1,000 mg, Oral, Q8H  apixaban, 2.5 mg, Oral, Q12H  [Held by provider] bumetanide, 1 mg, Oral, Daily  carvedilol, 6.25 mg, Oral, BID With Meals  gabapentin, 100 mg, Oral, TID  hydrALAZINE, 50 mg, Oral, TID  ipratropium, 0.5 mg, Nebulization, TID - RT  lisinopril, 40 mg, Oral, QAM  NIFEdipine XL, 30 mg, Oral, Daily  senna-docusate sodium, 2 tablet, Oral, BID      albuterol, 2.5 mg, Q6H PRN  polyethylene glycol, 17 g, Daily PRN   And  bisacodyl, 5 mg, Daily PRN   And  bisacodyl, 10 mg, Daily PRN  HYDROmorphone, 0.5 mg, Q2H PRN   And  naloxone, 0.1 mg, Q5 Min PRN  labetalol, 10 mg, Q4H PRN  ondansetron, 4 mg, Q6H PRN  oxyCODONE, 10 mg, Q4H PRN  oxyCODONE, 5 mg, Q4H PRN  sodium chloride, 500 mL, TID PRN  traMADol, 50 mg, Q6H PRN        Assessment & Plan       Status post total replacement of right hip    AF (paroxysmal atrial fibrillation)    Essential hypertension    CKD (chronic kidney disease) stage 4, GFR 15-29 ml/min    Arthritis of right hip    CHF (congestive heart failure)    Elevated hemoglobin A1c    ABLA, on  chronic anemia.        Plan   1. PT/OT- WBAT RLE  2. Pain control-prns       3. IS-encourage  4. DVT proph-Mechs/Eliquis at prophylactic dose for 1 week, then resume home dose  5. Bowel regimen  6. Resume home medications as appropriate  7. Monitor post-op labs  8. DC planning for rehab, likely tomorrow on 5/18/2025     HTN, CHF, afib  - Continue home hydralazine, lisinopril, nifedipine and Coreg  -Hold spironolactone and Bumex for now  - Monitor BP   - Holding parameters for BP meds  - Labetalol PRN for SBP>170     CKD  - Avoid nephrotoxic agents     ABLA  - Transfused , monitor h/h.  Improved.    D/w pt and RN.    Ninoska Mendez MD  05/17/25  10:28 EDT

## 2025-05-17 NOTE — THERAPY TREATMENT NOTE
Patient Name: Khushbu Bledsoe  : 3/17/1934    MRN: [id number]                              Today's Date: 2025       Admit Date: 2025    Visit Dx: No diagnosis found.  Patient Active Problem List   Diagnosis    AF (paroxysmal atrial fibrillation)    Acute on chronic diastolic CHF (congestive heart failure)    Essential hypertension    Nonrheumatic mitral valve regurgitation    Lumbar radiculopathy    Age-related physical debility    Loss of balance    Lumbar canal stenosis    Difficulty breathing    CKD (chronic kidney disease) stage 4, GFR 15-29 ml/min    Arthritis of right hip    Hyperglycemia    Status post total replacement of right hip    CHF (congestive heart failure)    Elevated hemoglobin A1c     Past Medical History:   Diagnosis Date    AF (paroxysmal atrial fibrillation) 09/10/2024    Arthritis 2010    Cervical disc disorder 2010    CHF (congestive heart failure)     CKD (chronic kidney disease) stage 4, GFR 15-29 ml/min 2025    Congenital heart disease     Coronary artery disease     History of shingles 2024    Hypertension     Low back pain     Lumbosacral disc disease 2010    Thoracic disc disorder      Past Surgical History:   Procedure Laterality Date    BACK SURGERY  2010    CATARACT EXTRACTION Bilateral     COLONOSCOPY      JOINT REPLACEMENT  2025    RETINAL DETACHMENT REPAIR Left     SPINAL FUSION  2010    TOTAL HIP ARTHROPLASTY Right 2025    Procedure: TOTAL HIP ARTHROPLASTY ANTERIOR RIGHT;  Surgeon: Julian Mcgee MD;  [facility];  Service: Orthopedics;  Laterality: Right;      General Information       Row Name 25 1040          Physical Therapy Time and Intention    Document Type therapy note (daily note)  -LR     Mode of Treatment physical therapy;individual therapy  -LR       Row Name 25 1040          General Information    Patient Profile Reviewed yes  -LR     Existing Precautions/Restrictions fall;hip, anterior  -LR     Barriers  to Rehab previous functional deficit  -LR       Row Name 05/17/25 1040          Cognition    Orientation Status (Cognition) oriented x 4  -LR       Row Name 05/17/25 1040          Safety Issues/Impairments Affecting Functional Mobility    Safety Issues Affecting Function (Mobility) safety precautions follow-through/compliance;safety precaution awareness;positioning of assistive device  -LR     Impairments Affecting Function (Mobility) pain;strength;range of motion (ROM);balance;endurance/activity tolerance  -LR               User Key  (r) = Recorded By, (t) = Taken By, (c) = Cosigned By      Initials Name Provider Type    LR Dorene Perales, PT Physical Therapist                   Mobility       Row Name 05/17/25 1040          Bed Mobility    Supine-Sit [place] (Bed Mobility) not tested  -LR     Sit-Supine [place] (Bed Mobility) not tested  -LR     Comment, (Bed Mobility) [facility] on arrival and at end of treatment.  -LR       Row Name 05/17/25 1040          Transfers    Comment, (Transfers) Verbal cues for correct hand placement with t/f and to step R LE out before t/f for comfort.  -LR       Row Name 05/17/25 1040          Bed-Chair Transfer    Bed-Chair [place] (Transfers) not tested  -LR       Row Name 05/17/25 1040          Sit-Stand Transfer    Sit-Stand [place] (Transfers) verbal cues;contact guard  -LR     Assistive Device (Sit-Stand Transfers) walker, front-wheeled  -LR       Row Name 05/17/25 1040          Gait/Stairs (Locomotion)    [place] Level (Gait) verbal cues;contact guard  -LR     Assistive Device (Gait) walker, front-wheeled  -LR     Patient was able to Ambulate yes  -LR     Distance in Feet (Gait) 50  -LR     Deviations/Abnormal Patterns (Gait) bilateral deviations;kasia decreased;gait speed decreased;stride length decreased;right sided deviations;antalgic  -LR     Bilateral Gait Deviations forward flexed posture;heel strike decreased  -LR     Right Sided Gait  Deviations weight shift ability decreased  -LR     Comment, (Gait/Stairs) Patient ambulated with step to gait pattern at slow pace with short step length. Patient reported this lessened the burning pain that has been limiting her. Verbal cues for increased R LE weight bearing, decreased UE weight bearing, and equal step length. Cues to stay closer to RW. Gait limited by pain and fatigue. Chair brought up behind patient to return to sitting.  -       Row Name 05/17/25 1040          Mobility    Extremity Weight-bearing Status right lower extremity  -LR     Right Lower Extremity (Weight-bearing Status) weight-bearing as tolerated (WBAT)  -               User Key  (r) = Recorded By, (t) = Taken By, (c) = Cosigned By      Initials Name Provider Type    LR Dorene Perales, PT Physical Therapist                   Obj/Interventions       Row Name 05/17/25 1040          Motor Skills    Therapeutic Exercise ankle;knee;hip;other (see comments)  cues for technique; CGA R heel slides, R hip abd, unable to achieve full extension with LAQ d/t pain  -       Row Name 05/17/25 1040          Hip (Therapeutic Exercise)    Hip (Therapeutic Exercise) strengthening exercise;isometric exercises  -     Hip Isometrics (Therapeutic Exercise) bilateral;gluteal sets;sitting;10 repetitions  -     Hip Strengthening (Therapeutic Exercise) bilateral;heel slides;aBduction;sitting;10 repetitions  -       Row Name 05/17/25 1040          Knee (Therapeutic Exercise)    Knee (Therapeutic Exercise) strengthening exercise;isometric exercises  -     Knee Isometrics (Therapeutic Exercise) bilateral;quad sets;sitting;10 repetitions  -     Knee Strengthening (Therapeutic Exercise) bilateral;SAQ (short arc quad);LAQ (long arc quad);sitting;10 repetitions;left;SLR (straight leg raise)  -       Row Name 05/17/25 1040          Ankle (Therapeutic Exercise)    Ankle (Therapeutic Exercise) AROM (active range of motion)  -     Ankle AROM  (Therapeutic Exercise) bilateral;dorsiflexion;plantarflexion;sitting;10 repetitions  -LR       Row Name 05/17/25 1040          Balance    Balance Assessment sitting static balance;sitting dynamic balance;standing dynamic balance;standing static balance  -LR     Static Sitting Balance standby assist  -LR     Dynamic Sitting Balance contact guard  -LR     Position, Sitting Balance unsupported;sitting in chair  -LR     Static Standing Balance contact guard  -LR     Dynamic Standing Balance contact guard  -LR     Position/Device Used, Standing Balance supported;walker, rolling  -LR               User Key  (r) = Recorded By, (t) = Taken By, (c) = Cosigned By      Initials Name Provider Type    LR Dorene Perales, PT Physical Therapist                   Goals/Plan       Row Name 05/17/25 1040          Bed Mobility Goal 1 (PT)    Activity/Assistive Device (Bed Mobility Goal 1, PT) sit to supine/supine to sit  -LR     [place] Level/Cues Needed (Bed Mobility Goal 1, PT) modified independence  -LR     Time Frame (Bed Mobility Goal 1, PT) long term goal (LTG);3 days  -LR     Progress/Outcomes (Bed Mobility Goal 1, PT) goal ongoing  -LR       Row Name 05/17/25 1040          Transfer Goal 1 (PT)    Activity/Assistive Device (Transfer Goal 1, PT) sit-to-stand/stand-to-sit  -LR     [place] Level/Cues Needed (Transfer Goal 1, PT) modified independence  -LR     Time Frame (Transfer Goal 1, PT) long term goal (LTG);3 days  -LR     Progress/Outcome (Transfer Goal 1, PT) continuing progress toward goal;goal ongoing  -LR       Row Name 05/17/25 1040          Gait Training Goal 1 (PT)    Activity/Assistive Device (Gait Training Goal 1, PT) gait (walking locomotion)  -LR     [place] Level (Gait Training Goal 1, PT) modified independence  -LR     Distance (Gait Training Goal 1, PT) 150 feet  -LR     Time Frame (Gait Training Goal 1, PT) long term goal (LTG);3 days  -LR     Progress/Outcome (Gait Training Goal 1, PT)  continuing progress toward goal;goal ongoing  -LR               User Key  (r) = Recorded By, (t) = Taken By, (c) = Cosigned By      Initials Name Provider Type    LR Dorene Perales, PT Physical Therapist                   Clinical Impression       Row Name 05/17/25 1040          Pain    Pretreatment Pain Rating 0/10 - no pain  -LR     Pain Location hip  -LR     Pain Side/Orientation right  -LR     Pain Management Interventions exercise or physical activity utilized;movement retraining implemented  -LR     Pre/Posttreatment Pain Comment c/o 'burning' pain with weight bearing and some ther ex  -LR     Additional Documentation Pain Scale: FACES Pre/Post-Treatment (Group)  -LR       Row Name 05/17/25 1040          Pain Scale: FACES Pre/Post-Treatment    Posttreatment Pain Rating 2-->hurts little bit  -LR       Row Name 05/17/25 1040          Plan of Care Review    Plan of Care Reviewed With patient  -LR     Progress improving  -LR     Outcome Evaluation Patient continues to be limited by 'burning' pain with weight bearing and ther ex. Improved with shorter step length this AM. Good effort with LE ther ex. Pateint currently below functional baseline, demonstrating decreased functional mobility status, impaired balance, decreased endurance, and decreased R hip strength/ROM. Will address these deficits to promote return to PLOF. Recommend IRF at d/c.  -LR       Row Name 05/17/25 1040          Therapy Assessment/Plan (PT)    Rehab Potential (PT) good  -LR     Criteria for Skilled Interventions Met (PT) yes;meets criteria;skilled treatment is necessary  -LR     Therapy Frequency (PT) 2 times/day  -LR       Row Name 05/17/25 1040          Positioning and Restraints    Pre-Treatment Position sitting in chair/recliner  -LR     Post Treatment Position chair  -LR     In Chair notified nsg;reclined;sitting;call light within reach;encouraged to call for assist;exit alarm on;legs elevated;waffle cushion  -LR                User Key  (r) = Recorded By, (t) = Taken By, (c) = Cosigned By      Initials Name Provider Type    Dorene Van, PT Physical Therapist                   Outcome Measures       Row Name 05/17/25 1040 05/17/25 0800       How much help from another person do you currently need...    Turning from your back to your side while in flat bed without using bedrails? 3  -LR 3  -MW    Moving from lying on back to sitting on the side of a flat bed without bedrails? 3  -LR 3  -MW    Moving to and from a bed to a chair (including a wheelchair)? 3  -LR 3  -MW    Standing up from a chair using your arms (e.g., wheelchair, bedside chair)? 3  -LR 3  -MW    Climbing 3-5 steps with a railing? 2  -LR 3  -MW    To walk in hospital room? 3  -LR 3  -MW    AM-PAC 6 Clicks Score (PT) 17  -LR 18  -MW    Highest Level of Mobility Goal Stand (1 or More Minutes)-5  -LR Walk 10 Steps or More-6  -MW      Row Name 05/17/25 1040          Functional Assessment    Outcome Measure Options AM-PAC 6 Clicks Basic Mobility (PT)  -LR               User Key  (r) = Recorded By, (t) = Taken By, (c) = Cosigned By      Initials Name Provider Type    Dorene Van, PT Physical Therapist    Bianca Loomis RN Registered Nurse                                 Physical Therapy Education       Title: PT OT SLP Therapies (In Progress)       Topic: Physical Therapy (Done)       Point: Mobility training (Done)       Learning Progress Summary            Patient Acceptance, E,D, VU,NR by  at 5/17/2025 1040    Comment: Educated on anterior hip precautions, weight bearing status, HEP, safety with mobility, benefits of mobility, correct sit<->stand t/f technique, correct gait mechanics, and progression of POC.    Acceptance, E, VU,NR by  at 5/16/2025 1613    Acceptance, E, VU,DU,NR by  at 5/16/2025 1121    Acceptance, E, VU by AE at 5/15/2025 1301    Acceptance, E, VU by AE at 5/15/2025 0812    Eager, E,D,H,TB, VU by SC at 5/14/2025 1819     Comment: reviewed HEP                      Point: Home exercise program (Done)       Learning Progress Summary            Patient Acceptance, E,D, VU,NR by LR at 5/17/2025 1040    Comment: Educated on anterior hip precautions, weight bearing status, HEP, safety with mobility, benefits of mobility, correct sit<->stand t/f technique, correct gait mechanics, and progression of POC.    Acceptance, E, VU,NR by LH at 5/16/2025 1613    Acceptance, E, VU,DU,NR by  at 5/16/2025 1121    Acceptance, E, VU by AE at 5/15/2025 1301    Acceptance, E, VU by AE at 5/15/2025 0812    Eager, E,D,H,TB, VU by SC at 5/14/2025 1819    Comment: reviewed HEP                      Point: Body mechanics (Done)       Learning Progress Summary            Patient Acceptance, E,D, VU,NR by LR at 5/17/2025 1040    Comment: Educated on anterior hip precautions, weight bearing status, HEP, safety with mobility, benefits of mobility, correct sit<->stand t/f technique, correct gait mechanics, and progression of POC.    Acceptance, E, VU,NR by  at 5/16/2025 1613    Acceptance, E, VU,DU,NR by  at 5/16/2025 1121    Acceptance, E, VU by AE at 5/15/2025 1301    Acceptance, E, VU by AE at 5/15/2025 0812    Eager, E,D,H,TB, VU by SC at 5/14/2025 1819    Comment: reviewed HEP                      Point: Precautions (Done)       Learning Progress Summary            Patient Acceptance, E,D, VU,NR by LR at 5/17/2025 1040    Comment: Educated on anterior hip precautions, weight bearing status, HEP, safety with mobility, benefits of mobility, correct sit<->stand t/f technique, correct gait mechanics, and progression of POC.    Acceptance, E, VU,NR by  at 5/16/2025 1613    Acceptance, E, VU,DU,NR by  at 5/16/2025 1121    Acceptance, E, VU by AE at 5/15/2025 1301    Acceptance, E, VU by AE at 5/15/2025 0812    Eager, E,D,H,TB, VU by SC at 5/14/2025 1819    Comment: reviewed HEP                                      User Key       Initials Effective Dates Name  Provider Type Discipline    SC 02/03/23 -  Yanni Clark, PT Physical Therapist PT    LR 02/03/23 -  Dorene Perales, PT Physical Therapist PT    AE 09/21/21 -  Dean Bautista, PT Physical Therapist PT     09/21/23 -  Pamella Hui, PT Physical Therapist PT                  PT Recommendation and Plan     Progress: improving  Outcome Evaluation: Patient continues to be limited by 'burning' pain with weight bearing and ther ex. Improved with shorter step length this AM. Good effort with LE ther ex. Pateint currently below functional baseline, demonstrating decreased functional mobility status, impaired balance, decreased endurance, and decreased R hip strength/ROM. Will address these deficits to promote return to PLOF. Recommend IRF at d/c.     Time Calculation:         PT Charges       Row Name 05/17/25 1040             Time Calculation    Start Time 1040  -LR      PT Received On 05/17/25  -LR      PT Goal Re-Cert Due Date 05/24/25  -LR         Timed Charges    89915 - PT Therapeutic Exercise Minutes 15  -LR      88965 - Gait Training Minutes  10  -LR         Total Minutes    Timed Charges Total Minutes 25  -LR       Total Minutes 25  -LR                User Key  (r) = Recorded By, (t) = Taken By, (c) = Cosigned By      Initials Name Provider Type    LR Dorene Perales, PT Physical Therapist                  Therapy Charges for Today       Code Description Service Date Service Provider Modifiers Qty    94964398759 HC PT THER PROC EA 15 MIN 5/17/2025 Dorene Perales, PT GP 1    04582924534 HC GAIT TRAINING EA 15 MIN 5/17/2025 Dorene Perales, PT GP 1            PT G-Codes  Outcome Measure Options: AM-PAC 6 Clicks Basic Mobility (PT)  AM-PAC 6 Clicks Score (PT): 17  AM-PAC 6 Clicks Score (OT): 21  PT Discharge Summary  Anticipated Discharge Disposition (PT): inpatient rehabilitation facility    Dorene Perales, PT  5/17/2025

## 2025-05-17 NOTE — PLAN OF CARE
Problem: Adult Inpatient Plan of Care  Goal: Plan of Care Review  Outcome: Progressing  Goal: Patient-Specific Goal (Individualized)  Outcome: Progressing  Goal: Absence of Hospital-Acquired Illness or Injury  Outcome: Progressing  Intervention: Identify and Manage Fall Risk  Recent Flowsheet Documentation  Taken 5/17/2025 0411 by Davi Santizo RN  Safety Promotion/Fall Prevention:   activity supervised   assistive device/personal items within reach   clutter free environment maintained   fall prevention program maintained   gait belt   mobility aid in reach   nonskid shoes/slippers when out of bed   room organization consistent   safety round/check completed  Taken 5/17/2025 0212 by Davi Santizo RN  Safety Promotion/Fall Prevention:   activity supervised   assistive device/personal items within reach   clutter free environment maintained   fall prevention program maintained   gait belt   mobility aid in reach   nonskid shoes/slippers when out of bed   room organization consistent   safety round/check completed  Taken 5/17/2025 0000 by Davi Santizo RN  Safety Promotion/Fall Prevention:   activity supervised   assistive device/personal items within reach   clutter free environment maintained   fall prevention program maintained   gait belt   mobility aid in reach   nonskid shoes/slippers when out of bed   room organization consistent   safety round/check completed  Taken 5/16/2025 2200 by Davi Santizo RN  Safety Promotion/Fall Prevention:   activity supervised   assistive device/personal items within reach   clutter free environment maintained   fall prevention program maintained   gait belt   nonskid shoes/slippers when out of bed   mobility aid in reach   room organization consistent   safety round/check completed  Taken 5/16/2025 2000 by Davi Santizo RN  Safety Promotion/Fall Prevention:   activity supervised   assistive device/personal items within reach   clutter free environment maintained    fall prevention program maintained   gait belt   mobility aid in reach   nonskid shoes/slippers when out of bed   room organization consistent   safety round/check completed  Intervention: Prevent Skin Injury  Recent Flowsheet Documentation  Taken 5/17/2025 0411 by Davi Santizo RN  Body Position: position changed independently  Taken 5/17/2025 0212 by Davi Santizo RN  Body Position: position changed independently  Skin Protection: incontinence pads utilized  Taken 5/17/2025 0000 by Davi Santizo RN  Body Position: position changed independently  Skin Protection: incontinence pads utilized  Taken 5/16/2025 2200 by Davi Santizo RN  Body Position: position changed independently  Skin Protection: incontinence pads utilized  Taken 5/16/2025 2000 by Davi Santizo RN  Body Position: position changed independently  Skin Protection: incontinence pads utilized  Intervention: Prevent and Manage VTE (Venous Thromboembolism) Risk  Recent Flowsheet Documentation  Taken 5/17/2025 0411 by Davi Santizo RN  VTE Prevention/Management: (eliquis) other (see comments)  Taken 5/17/2025 0212 by Davi Santizo RN  VTE Prevention/Management: (eliquis) other (see comments)  Taken 5/17/2025 0000 by Davi Santizo RN  VTE Prevention/Management:   bilateral   SCDs (sequential compression devices) on  Taken 5/16/2025 2200 by Davi Santizo RN  VTE Prevention/Management:   bilateral   SCDs (sequential compression devices) on  Taken 5/16/2025 2000 by Davi Santizo RN  VTE Prevention/Management:   bilateral   SCDs (sequential compression devices) on  Intervention: Prevent Infection  Recent Flowsheet Documentation  Taken 5/17/2025 0411 by Davi Santizo RN  Infection Prevention:   environmental surveillance performed   rest/sleep promoted   single patient room provided  Taken 5/17/2025 0212 by Davi Santizo RN  Infection Prevention:   environmental surveillance performed   rest/sleep promoted   single patient room  provided  Taken 5/17/2025 0000 by Davi Santizo RN  Infection Prevention:   environmental surveillance performed   rest/sleep promoted   single patient room provided  Taken 5/16/2025 2200 by Davi Santizo RN  Infection Prevention:   environmental surveillance performed   rest/sleep promoted   single patient room provided  Taken 5/16/2025 2000 by Davi Santizo RN  Infection Prevention:   environmental surveillance performed   rest/sleep promoted   single patient room provided  Goal: Optimal Comfort and Wellbeing  Outcome: Progressing  Intervention: Monitor Pain and Promote Comfort  Recent Flowsheet Documentation  Taken 5/16/2025 2200 by Davi Santizo RN  Pain Management Interventions:   quiet environment facilitated   no interventions per patient request  Taken 5/16/2025 2000 by Davi Santizo RN  Pain Management Interventions: pain medication given  Intervention: Provide Person-Centered Care  Recent Flowsheet Documentation  Taken 5/16/2025 2200 by Davi Santizo RN  Trust Relationship/Rapport: care explained  Taken 5/16/2025 2000 by Davi Santizo RN  Trust Relationship/Rapport:   care explained   questions answered   questions encouraged  Goal: Readiness for Transition of Care  Outcome: Progressing     Problem: Fall Injury Risk  Goal: Absence of Fall and Fall-Related Injury  Outcome: Progressing  Intervention: Identify and Manage Contributors  Recent Flowsheet Documentation  Taken 5/16/2025 2200 by Davi Santizo RN  Self-Care Promotion: independence encouraged  Taken 5/16/2025 2000 by Davi Santizo RN  Medication Review/Management: medications reviewed  Self-Care Promotion: independence encouraged  Intervention: Promote Injury-Free Environment  Recent Flowsheet Documentation  Taken 5/17/2025 0411 by Davi Santizo RN  Safety Promotion/Fall Prevention:   activity supervised   assistive device/personal items within reach   clutter free environment maintained   fall prevention program maintained   gait  belt   mobility aid in reach   nonskid shoes/slippers when out of bed   room organization consistent   safety round/check completed  Taken 5/17/2025 0212 by Davi Santizo RN  Safety Promotion/Fall Prevention:   activity supervised   assistive device/personal items within reach   clutter free environment maintained   fall prevention program maintained   gait belt   mobility aid in reach   nonskid shoes/slippers when out of bed   room organization consistent   safety round/check completed  Taken 5/17/2025 0000 by Davi Santizo RN  Safety Promotion/Fall Prevention:   activity supervised   assistive device/personal items within reach   clutter free environment maintained   fall prevention program maintained   gait belt   mobility aid in reach   nonskid shoes/slippers when out of bed   room organization consistent   safety round/check completed  Taken 5/16/2025 2200 by Davi Santizo RN  Safety Promotion/Fall Prevention:   activity supervised   assistive device/personal items within reach   clutter free environment maintained   fall prevention program maintained   gait belt   nonskid shoes/slippers when out of bed   mobility aid in reach   room organization consistent   safety round/check completed  Taken 5/16/2025 2000 by Davi Santizo RN  Safety Promotion/Fall Prevention:   activity supervised   assistive device/personal items within reach   clutter free environment maintained   fall prevention program maintained   gait belt   mobility aid in reach   nonskid shoes/slippers when out of bed   room organization consistent   safety round/check completed  Goal: Absence of Fall and Fall-Related Injury  Outcome: Progressing  Intervention: Identify and Manage Contributors  Recent Flowsheet Documentation  Taken 5/16/2025 2200 by Davi Santizo RN  Self-Care Promotion: independence encouraged  Taken 5/16/2025 2000 by Davi Santizo RN  Medication Review/Management: medications reviewed  Self-Care Promotion: independence  encouraged  Intervention: Promote Injury-Free Environment  Recent Flowsheet Documentation  Taken 5/17/2025 0411 by Davi Santizo RN  Safety Promotion/Fall Prevention:   activity supervised   assistive device/personal items within reach   clutter free environment maintained   fall prevention program maintained   gait belt   mobility aid in reach   nonskid shoes/slippers when out of bed   room organization consistent   safety round/check completed  Taken 5/17/2025 0212 by Davi Santizo RN  Safety Promotion/Fall Prevention:   activity supervised   assistive device/personal items within reach   clutter free environment maintained   fall prevention program maintained   gait belt   mobility aid in reach   nonskid shoes/slippers when out of bed   room organization consistent   safety round/check completed  Taken 5/17/2025 0000 by Davi Santizo RN  Safety Promotion/Fall Prevention:   activity supervised   assistive device/personal items within reach   clutter free environment maintained   fall prevention program maintained   gait belt   mobility aid in reach   nonskid shoes/slippers when out of bed   room organization consistent   safety round/check completed  Taken 5/16/2025 2200 by Davi Santizo RN  Safety Promotion/Fall Prevention:   activity supervised   assistive device/personal items within reach   clutter free environment maintained   fall prevention program maintained   gait belt   nonskid shoes/slippers when out of bed   mobility aid in reach   room organization consistent   safety round/check completed  Taken 5/16/2025 2000 by Davi Santizo RN  Safety Promotion/Fall Prevention:   activity supervised   assistive device/personal items within reach   clutter free environment maintained   fall prevention program maintained   gait belt   mobility aid in reach   nonskid shoes/slippers when out of bed   room organization consistent   safety round/check completed     Problem: Skin Injury Risk Increased  Goal:  Skin Health and Integrity  Outcome: Progressing  Intervention: Optimize Skin Protection  Recent Flowsheet Documentation  Taken 5/17/2025 0411 by Davi Santizo RN  Activity Management: activity encouraged  Pressure Reduction Techniques:   frequent weight shift encouraged   pressure points protected  Head of Bed (HOB) Positioning: HOB elevated  Pressure Reduction Devices:   pressure-redistributing mattress utilized   positioning supports utilized  Taken 5/17/2025 0212 by Davi Santizo RN  Activity Management: activity encouraged  Pressure Reduction Techniques:   frequent weight shift encouraged   pressure points protected  Head of Bed (HOB) Positioning: HOB elevated  Pressure Reduction Devices:   pressure-redistributing mattress utilized   positioning supports utilized  Skin Protection: incontinence pads utilized  Taken 5/17/2025 0000 by Davi Santizo RN  Activity Management: activity encouraged  Pressure Reduction Techniques:   frequent weight shift encouraged   pressure points protected  Head of Bed (HOB) Positioning: HOB elevated  Pressure Reduction Devices:   pressure-redistributing mattress utilized   positioning supports utilized  Skin Protection: incontinence pads utilized  Taken 5/16/2025 2200 by Davi Santizo RN  Activity Management: activity encouraged  Pressure Reduction Techniques:   frequent weight shift encouraged   pressure points protected  Head of Bed (HOB) Positioning: HOB elevated  Pressure Reduction Devices:   pressure-redistributing mattress utilized   positioning supports utilized  Skin Protection: incontinence pads utilized  Taken 5/16/2025 2000 by Davi Santizo RN  Activity Management: activity encouraged  Pressure Reduction Techniques:   frequent weight shift encouraged   pressure points protected  Head of Bed (HOB) Positioning: HOB elevated  Pressure Reduction Devices:   pressure-redistributing mattress utilized   positioning supports utilized  Skin Protection: incontinence pads  utilized     Problem: Comorbidity Management  Goal: Blood Pressure in Desired Range  Outcome: Progressing  Intervention: Maintain Blood Pressure Management  Recent Flowsheet Documentation  Taken 5/16/2025 2000 by Davi Santizo RN  Medication Review/Management: medications reviewed     Problem: Pain Acute  Goal: Optimal Pain Control and Function  Outcome: Progressing  Intervention: Optimize Psychosocial Wellbeing  Recent Flowsheet Documentation  Taken 5/16/2025 2000 by Davi Santizo RN  Supportive Measures: active listening utilized  Diversional Activities: television  Intervention: Develop Pain Management Plan  Recent Flowsheet Documentation  Taken 5/16/2025 2200 by Davi Santizo RN  Pain Management Interventions:   quiet environment facilitated   no interventions per patient request  Taken 5/16/2025 2000 by Davi Santizo RN  Pain Management Interventions: pain medication given  Intervention: Prevent or Manage Pain  Recent Flowsheet Documentation  Taken 5/16/2025 2000 by Davi Santizo RN  Bowel Elimination Promotion: adequate fluid intake promoted  Medication Review/Management: medications reviewed   Goal Outcome Evaluation:

## 2025-05-18 VITALS
HEIGHT: 60 IN | RESPIRATION RATE: 16 BRPM | OXYGEN SATURATION: 98 % | WEIGHT: 134 LBS | TEMPERATURE: 97.2 F | BODY MASS INDEX: 26.31 KG/M2 | HEART RATE: 72 BPM | DIASTOLIC BLOOD PRESSURE: 73 MMHG | SYSTOLIC BLOOD PRESSURE: 159 MMHG

## 2025-05-18 PROCEDURE — 97110 THERAPEUTIC EXERCISES: CPT

## 2025-05-18 PROCEDURE — 63710000001 NIFEDIPINE XL 30 MG TABLET SUSTAINED-RELEASE 24 HOUR: Performed by: INTERNAL MEDICINE

## 2025-05-18 PROCEDURE — 63710000001 HYDRALAZINE 50 MG TABLET: Performed by: INTERNAL MEDICINE

## 2025-05-18 PROCEDURE — 63710000001 APIXABAN 2.5 MG TABLET: Performed by: NURSE PRACTITIONER

## 2025-05-18 PROCEDURE — 63710000001 SENNOSIDES-DOCUSATE 8.6-50 MG TABLET: Performed by: INTERNAL MEDICINE

## 2025-05-18 PROCEDURE — 97116 GAIT TRAINING THERAPY: CPT

## 2025-05-18 PROCEDURE — A9270 NON-COVERED ITEM OR SERVICE: HCPCS | Performed by: INTERNAL MEDICINE

## 2025-05-18 PROCEDURE — 63710000001 ACETAMINOPHEN EXTRA STRENGTH 500 MG TABLET: Performed by: ORTHOPAEDIC SURGERY

## 2025-05-18 PROCEDURE — 63710000001 GABAPENTIN 100 MG CAPSULE: Performed by: INTERNAL MEDICINE

## 2025-05-18 PROCEDURE — 63710000001 CARVEDILOL 6.25 MG TABLET: Performed by: INTERNAL MEDICINE

## 2025-05-18 PROCEDURE — 94761 N-INVAS EAR/PLS OXIMETRY MLT: CPT

## 2025-05-18 PROCEDURE — 63710000001 TRAMADOL 50 MG TABLET: Performed by: INTERNAL MEDICINE

## 2025-05-18 PROCEDURE — 63710000001 LISINOPRIL 40 MG TABLET: Performed by: INTERNAL MEDICINE

## 2025-05-18 PROCEDURE — 94799 UNLISTED PULMONARY SVC/PX: CPT

## 2025-05-18 PROCEDURE — A9270 NON-COVERED ITEM OR SERVICE: HCPCS | Performed by: ORTHOPAEDIC SURGERY

## 2025-05-18 PROCEDURE — A9270 NON-COVERED ITEM OR SERVICE: HCPCS | Performed by: NURSE PRACTITIONER

## 2025-05-18 RX ORDER — TRAMADOL HYDROCHLORIDE 50 MG/1
50 TABLET ORAL EVERY 6 HOURS
Start: 2025-05-18

## 2025-05-18 RX ORDER — CEFADROXIL 500 MG/1
500 CAPSULE ORAL EVERY 12 HOURS
Start: 2025-05-18 | End: 2025-05-20

## 2025-05-18 RX ORDER — ACETAMINOPHEN 500 MG
1000 TABLET ORAL 3 TIMES DAILY
Start: 2025-05-18 | End: 2025-05-23

## 2025-05-18 RX ORDER — POLYETHYLENE GLYCOL 3350 17 G/17G
17 POWDER, FOR SOLUTION ORAL DAILY PRN
Start: 2025-05-18

## 2025-05-18 RX ADMIN — IPRATROPIUM BROMIDE 0.5 MG: 0.5 SOLUTION RESPIRATORY (INHALATION) at 07:49

## 2025-05-18 RX ADMIN — CARVEDILOL 6.25 MG: 6.25 TABLET, FILM COATED ORAL at 08:20

## 2025-05-18 RX ADMIN — DOCUSATE SODIUM 50 MG AND SENNOSIDES 8.6 MG 2 TABLET: 8.6; 5 TABLET, FILM COATED ORAL at 08:20

## 2025-05-18 RX ADMIN — ACETAMINOPHEN 1000 MG: 500 TABLET ORAL at 03:46

## 2025-05-18 RX ADMIN — LISINOPRIL 40 MG: 40 TABLET ORAL at 06:26

## 2025-05-18 RX ADMIN — ACETAMINOPHEN 1000 MG: 500 TABLET ORAL at 12:09

## 2025-05-18 RX ADMIN — GABAPENTIN 100 MG: 100 CAPSULE ORAL at 08:20

## 2025-05-18 RX ADMIN — APIXABAN 2.5 MG: 2.5 TABLET, FILM COATED ORAL at 08:20

## 2025-05-18 RX ADMIN — NIFEDIPINE 30 MG: 30 TABLET, FILM COATED, EXTENDED RELEASE ORAL at 08:20

## 2025-05-18 RX ADMIN — TRAMADOL HYDROCHLORIDE 50 MG: 50 TABLET, COATED ORAL at 03:54

## 2025-05-18 RX ADMIN — HYDRALAZINE HYDROCHLORIDE 50 MG: 50 TABLET ORAL at 08:20

## 2025-05-18 NOTE — DISCHARGE SUMMARY
Patient Name: Khushbu Bledsoe  MRN: [id number]  : 3/17/1934  DOS: 2025    Attending: Julian Mcgee MD    Primary Care Provider: Ana Ross MD    Date of Admission:.2025  7:03 AM    Date of Discharge:  2025    Discharge Diagnosis:   Status post total replacement of right hip    AF (paroxysmal atrial fibrillation)    Essential hypertension    CKD (chronic kidney disease) stage 4, GFR 15-29 ml/min    Arthritis of right hip    CHF (congestive heart failure)    Elevated hemoglobin A1c      Hospital Course  At admit:  Patient is a pleasant 89 y.o. female presented for scheduled surgery by Dr. Mcgee. She underwent right total hip arthroplasty under spinal anesthesia.  She tolerated surgery well and was admitted for further medical management.  Her hip has been painful for several years.  She uses a walker with ambulation.  She denies recent falls.     When seen in PACU she is doing well.  Her pain is well-controlled.  She denies nausea, shortness of breath or chest pain.  No history of DVT or PE.     She has a history of hypertension, congestive heart failure and atrial fibrillation.  She is on chronic Eliquis.  She is followed by Dr. Mesa and had preop cardiac clearance.     After admit  Patient was provided pain medications as needed for pain control.    Adjustments were made to pain medications to optimize postop pain management when indicated. Risks and benefits of opiate medications discussed with patient. TRICIA report was reviewed.    She was seen by PT  and has progressed well over her stay.IPR was recommended.     She used an IS for atelectasis prophylaxis and Apixaban( started at 2.5 mg bid on POD 1) along with mechanicals for DVT prophylaxis.    Home medications were resumed as appropriate, and labs were monitored , she required PRBC transfusion, H/H stable post transfusion.     With the progress she has made, pt is ready for DC to [facility] today.      Discussed with patient  "regarding plan and she shows understanding and agreement.      Patient will have  PT following discharge.        Procedures Performed  Procedure(s):  TOTAL HIP ARTHROPLASTY ANTERIOR RIGHT       Pertinent Test Results:    I reviewed the patient's new clinical results.   Results from last 7 days   Lab Units 25  1916 25  0741 05/15/25  1021   WBC 10*3/mm3 7.51  --  10.58   HEMOGLOBIN g/dL 9.8* 6.3* 7.0*   HEMATOCRIT % 30.0* 20.5* 22.4*   PLATELETS 10*3/mm3 193  --  224     Results from last 7 days   Lab Units 25  0741 05/15/25  1021   SODIUM mmol/L 139 137   POTASSIUM mmol/L 4.6 4.9   CHLORIDE mmol/L 108* 104   CO2 mmol/L 23.0 20.0*   BUN mg/dL 46* 58*   CREATININE mg/dL 1.41* 1.83*   CALCIUM mg/dL 8.2 8.1*   GLUCOSE mg/dL 119* 186*     I reviewed the patient's new imaging including images and reports.      Physical therapy  Dorene Perales, PT   Physical Therapist  Physical Therapy     Plan of Care     Signed     Date of Service: 25  Creation Time: 25     Signed         Goal Outcome Evaluation:  Plan of Care Reviewed With: patient  Progress: improving  Outcome Evaluation: Patient increased gait distance with improved gait mechanics and less pain today. Patient currently below functional baseline, demonstrating decresaed functional mobility status, impaired balance, decreased endurance, and decreased strength/ROM. Will address these deficits to promote return to PLOF. Recommend IRF at d/c.     Anticipated Discharge Disposition (PT): inpatient rehabilitation facility              Discharge Assessment:      Visit Vitals  /74 (BP Location: Right arm, Patient Position: Lying)   Pulse 71   Temp 97.7 °F (36.5 °C) (Oral)   Resp 18   Ht 152.4 cm (60\")   Wt 60.8 kg (134 lb)   SpO2 96%   BMI 26.17 kg/m²     Temp (24hrs), Av.8 °F (36.6 °C), Min:97.5 °F (36.4 °C), Max:98.1 °F (36.7 °C)      General Appearance:    Alert, cooperative, in no acute distress   Lungs:     Clear to " auscultation,respirations regular, even and                   unlabored    Heart:    Regular rhythm and normal rate, normal S1 and S2   Abdomen:     Normal bowel sounds, no masses, no organomegaly, soft        non-tender, non-distended, no guarding, no rebound   tenderness   Extremities:   CDI dressing over hip incision.    Pulses:   Pulses palpable and equal bilaterally   Skin:   No bleeding, bruising or rash   Neurologic:   Cranial nerves 2 - 12 grossly intact, sensation intact, Flexion and dorsiflexion intact bilateral feet.         Discharge Disposition: [facility].         Discharge Medications        PAUSE taking these medications        Instructions Start Date   Eliquis 2.5 MG tablet tablet  Wait to take this until: May 22, 2025  To resume after finishing 1 week of 2.5 mg twice daily  Generic drug: apixaban   2.5 mg, Oral, Every 12 Hours Scheduled  You also have another medication with the same name that you may need to continue taking.             New Medications        Instructions Start Date   polyethylene glycol 17 g packet  Commonly known as: MIRALAX   17 g, Oral, Daily PRN             Changes to Medications        Instructions Start Date   apixaban 2.5 MG tablet tablet  Commonly known as: Eliquis  What changed: Another medication with the same name was paused. Ask your nurse or doctor if you should take this medication.   2.5 mg, Oral, Every 12 Hours Scheduled, Then resume home dose.      traMADol 50 MG tablet  Commonly known as: ULTRAM  What changed: how much to take   50 mg, Oral, Every 6 Hours             Continue These Medications        Instructions Start Date   acetaminophen 500 MG tablet  Commonly known as: TYLENOL   1,000 mg, Oral, 3 times daily      albuterol sulfate  (90 Base) MCG/ACT inhaler  Commonly known as: PROVENTIL HFA;VENTOLIN HFA;PROAIR HFA   2 puffs, Inhalation, Every 4 Hours PRN      bumetanide 1 MG tablet  Commonly known as: BUMEX   1 mg, Daily      carvedilol 6.25 MG  tablet  Commonly known as: COREG   6.25 mg, Oral, 2 Times Daily With Meals      cefadroxil 500 MG capsule  Commonly known as: DURICEF   Take 1 capsule by mouth every 12 hours for 3 days      Vitamin D3 50 MCG (2000 UT) tablet   50 mcg, Oral, Daily      Cholecalciferol 125 MCG (5000 UT) tablet   5,000 Units, Daily      docusate sodium 100 MG capsule  Commonly known as: Colace   100-200 mg, Oral, Daily PRN      fish oil 1200 MG capsule capsule   1,200 mg, 2 Times Daily With Meals      gabapentin 100 MG capsule  Commonly known as: NEURONTIN   100 mg, 3 Times Daily      hydrALAZINE 50 MG tablet  Commonly known as: APRESOLINE   50 mg, Oral, 3 Times Daily      ipratropium 17 MCG/ACT inhaler  Commonly known as: ATROVENT HFA   2 puffs, Inhalation, 4 Times Daily - RT      lisinopril 40 MG tablet  Commonly known as: PRINIVIL,ZESTRIL   40 mg, Oral, Every Morning      Multi-Vitamin Daily tablet tablet   1 tablet, Daily      NIFEdipine XL 30 MG 24 hr tablet  Commonly known as: PROCARDIA XL   30 mg, Oral, Daily      ondansetron 4 MG tablet  Commonly known as: ZOFRAN   4 mg, Oral, Every 6 Hours PRN      pantoprazole 40 MG EC tablet  Commonly known as: PROTONIX   40 mg, Oral, Every Morning Before Breakfast      PRESERVISION AREDS 2 PO   1 capsule, 2 Times Daily      spironolactone 12.5 MG tablet half tablet  Commonly known as: ALDACTONE   12.5 mg, Daily             Stop These Medications      oxyCODONE 5 MG immediate release tablet  Commonly known as: ROXICODONE     Tranexamic Acid 650 MG tablet              Discharge Diet: Cardiac.     Activity at Discharge:   Weight bearing as tolerated         Future Appointments   Date Time Provider Department Center   6/12/2025 10:30 AM Ana Ross MD [facility]]   2/23/2026  3:45 PM Prosper Mesa MD [facility]      June 24 at 1 PM with Tavon KENNEDY PA-C Dragon disclaimer:  Part of this encounter note is an electronic transcription/translation of spoken  language to printed text. The electronic translation of spoken language may permit erroneous, or at times, nonsensical words or phrases to be inadvertently transcribed; Although I have reviewed the note for such errors, some may still exist.       Ninoska Mendez MD  05/18/25  11:56 EDT

## 2025-05-18 NOTE — PLAN OF CARE
Goal Outcome Evaluation:  Plan of Care Reviewed With: patient        Progress: improving  Outcome Evaluation: Patient increased gait distance with improved gait mechanics and less pain today. Patient currently below functional baseline, demonstrating decresaed functional mobility status, impaired balance, decreased endurance, and decreased strength/ROM. Will address these deficits to promote return to PLOF. Recommend IRF at d/c.    Anticipated Discharge Disposition (PT): inpatient rehabilitation facility

## 2025-05-18 NOTE — THERAPY TREATMENT NOTE
Patient Name: Khushbu Bledsoe  : 3/17/1934    MRN: [id number]                              Today's Date: 2025       Admit Date: 2025    Visit Dx: No diagnosis found.  Patient Active Problem List   Diagnosis    AF (paroxysmal atrial fibrillation)    Acute on chronic diastolic CHF (congestive heart failure)    Essential hypertension    Nonrheumatic mitral valve regurgitation    Lumbar radiculopathy    Age-related physical debility    Loss of balance    Lumbar canal stenosis    Difficulty breathing    CKD (chronic kidney disease) stage 4, GFR 15-29 ml/min    Arthritis of right hip    Hyperglycemia    Status post total replacement of right hip    CHF (congestive heart failure)    Elevated hemoglobin A1c     Past Medical History:   Diagnosis Date    AF (paroxysmal atrial fibrillation) 09/10/2024    Arthritis 2010    Cervical disc disorder     CHF (congestive heart failure)     CKD (chronic kidney disease) stage 4, GFR 15-29 ml/min 2025    Congenital heart disease     Coronary artery disease     History of shingles 2024    Hypertension     Low back pain     Lumbosacral disc disease     Thoracic disc disorder      Past Surgical History:   Procedure Laterality Date    BACK SURGERY  2010    CATARACT EXTRACTION Bilateral     COLONOSCOPY      JOINT REPLACEMENT  2025    RETINAL DETACHMENT REPAIR Left     SPINAL FUSION  2010    TOTAL HIP ARTHROPLASTY Right 2025    Procedure: TOTAL HIP ARTHROPLASTY ANTERIOR RIGHT;  Surgeon: Julian Mcgee MD;  Location: [facility];  Service: Orthopedics;  Laterality: Right;      General Information       Row Name 25 0836          Physical Therapy Time and Intention    Document Type therapy note (daily note)  -LR     Mode of Treatment physical therapy;individual therapy  -LR       Row Name 25 0836          General Information    Patient Profile Reviewed yes  -LR     Existing Precautions/Restrictions fall;right;hip, anterior  -LR      Barriers to Rehab previous functional deficit  -LR       Row Name 05/18/25 0836          Cognition    Orientation Status (Cognition) oriented x 4  -LR       Row Name 05/18/25 0836          Safety Issues/Impairments Affecting Functional Mobility    Safety Issues Affecting Function (Mobility) safety precautions follow-through/compliance;safety precaution awareness;positioning of assistive device  -LR     Impairments Affecting Function (Mobility) pain;strength;range of motion (ROM);balance;endurance/activity tolerance  -LR               User Key  (r) = Recorded By, (t) = Taken By, (c) = Cosigned By      Initials Name Provider Type    LR Dorene Perales, PT Physical Therapist                   Mobility       Row Name 05/18/25 0836          Bed Mobility    Bed Mobility supine-sit  -LR     Supine-Sit [place] (Bed Mobility) verbal cues;minimum assist (75% patient effort)  -LR     Sit-Supine [place] (Bed Mobility) not tested  -LR     Assistive Device (Bed Mobility) head of bed elevated;bed rails;other (see comments)  -LR     Comment, (Bed Mobility) Verbal cues to move LEs towards EOB and to push up from bed to raise trunk into sitting. Min assist to move R LE. Mild dizziness upon sitting up. Improved with rest.  -LR       Row Name 05/18/25 0836          Transfers    Comment, (Transfers) Verbal cues for correct hand placement with t/f and to step R LE out before t/f for comfort. Assisted to and from bathroom, cues to use grab bar for UE support.  -LR       Row Name 05/18/25 0836          Bed-Chair Transfer    Bed-Chair [place] (Transfers) not tested  -LR       Row Name 05/18/25 0836          Sit-Stand Transfer    Sit-Stand [place] (Transfers) verbal cues;standby assist  -LR     Assistive Device (Sit-Stand Transfers) walker, front-wheeled  -LR       Row Name 05/18/25 0836          Gait/Stairs (Locomotion)    [place] Level (Gait) verbal cues;contact guard  -LR     Assistive Device (Gait) walker,  front-wheeled  -LR     Patient was able to Ambulate yes  -LR     Distance in Feet (Gait) 80  -LR     Deviations/Abnormal Patterns (Gait) bilateral deviations;kasia decreased;gait speed decreased;stride length decreased;right sided deviations;antalgic  -LR     Bilateral Gait Deviations forward flexed posture;heel strike decreased  -LR     Right Sided Gait Deviations weight shift ability decreased  -LR     [place] Level (Stairs) not tested  -LR     Comment, (Gait/Stairs) Patient ambulated with step through gait pattern at slow pace with short step length. Verbal cues for increased step length, increased R knee flexion during swing phase, and upright posture. Improved slightly with cues for correction. Gait limited by fatigue and pain. Chair brought up behind to return to sitting.  -LR       Row Name 05/18/25 0836          Mobility    Extremity Weight-bearing Status right lower extremity  -LR     Right Lower Extremity (Weight-bearing Status) weight-bearing as tolerated (WBAT)  -LR               User Key  (r) = Recorded By, (t) = Taken By, (c) = Cosigned By      Initials Name Provider Type    LR Dorene Perales, PT Physical Therapist                   Obj/Interventions       Row Name 05/18/25 0836          Motor Skills    Therapeutic Exercise ankle;knee;hip;other (see comments)  cues for technique, CGA for R heel slides, R heel slides  -LR       Row Name 05/18/25 0836          Hip (Therapeutic Exercise)    Hip (Therapeutic Exercise) strengthening exercise;isometric exercises  -LR     Hip Isometrics (Therapeutic Exercise) bilateral;gluteal sets;sitting;10 repetitions  -LR     Hip Strengthening (Therapeutic Exercise) bilateral;heel slides;aBduction;sitting;10 repetitions  -LR       Row Name 05/18/25 0836          Knee (Therapeutic Exercise)    Knee (Therapeutic Exercise) strengthening exercise;isometric exercises  -LR     Knee Isometrics (Therapeutic Exercise) bilateral;quad sets;sitting;10 repetitions  -LR      Knee Strengthening (Therapeutic Exercise) bilateral;SAQ (short arc quad);LAQ (long arc quad);left;SLR (straight leg raise);sitting;10 repetitions  -LR       Row Name 05/18/25 0836          Ankle (Therapeutic Exercise)    Ankle (Therapeutic Exercise) AROM (active range of motion)  -LR     Ankle AROM (Therapeutic Exercise) bilateral;dorsiflexion;plantarflexion;sitting;10 repetitions  -LR       Row Name 05/18/25 0836          Balance    Balance Assessment sitting static balance;sitting dynamic balance;standing static balance;standing dynamic balance  -LR     Static Sitting Balance standby assist  -LR     Dynamic Sitting Balance standby assist  -LR     Position, Sitting Balance unsupported;sitting edge of bed;sitting in chair  -LR     Static Standing Balance contact guard  -LR     Dynamic Standing Balance contact guard  -LR     Position/Device Used, Standing Balance supported;walker, rolling  -LR               User Key  (r) = Recorded By, (t) = Taken By, (c) = Cosigned By      Initials Name Provider Type    LR Dorene Perales, PT Physical Therapist                   Goals/Plan       Row Name 05/18/25 0836          Bed Mobility Goal 1 (PT)    Activity/Assistive Device (Bed Mobility Goal 1, PT) sit to supine/supine to sit  -LR     [place] Level/Cues Needed (Bed Mobility Goal 1, PT) modified independence  -LR     Time Frame (Bed Mobility Goal 1, PT) long term goal (LTG);3 days  -LR     Progress/Outcomes (Bed Mobility Goal 1, PT) goal ongoing;continuing progress toward goal  -LR       Row Name 05/18/25 0836          Transfer Goal 1 (PT)    Activity/Assistive Device (Transfer Goal 1, PT) sit-to-stand/stand-to-sit  -LR     [place] Level/Cues Needed (Transfer Goal 1, PT) modified independence  -LR     Time Frame (Transfer Goal 1, PT) long term goal (LTG);3 days  -LR     Progress/Outcome (Transfer Goal 1, PT) goal ongoing;continuing progress toward goal  -LR       Row Name 05/18/25 0836          Gait  Training Goal 1 (PT)    Activity/Assistive Device (Gait Training Goal 1, PT) gait (walking locomotion)  -LR     [place] Level (Gait Training Goal 1, PT) modified independence  -LR     Distance (Gait Training Goal 1, PT) 150 feet  -LR     Time Frame (Gait Training Goal 1, PT) long term goal (LTG);3 days  -LR     Progress/Outcome (Gait Training Goal 1, PT) continuing progress toward goal;goal ongoing  -LR               User Key  (r) = Recorded By, (t) = Taken By, (c) = Cosigned By      Initials Name Provider Type    LR Dorene Perales, PT Physical Therapist                   Clinical Impression       Row Name 05/18/25 0836          Pain    Pretreatment Pain Rating 0/10 - no pain  -LR     Posttreatment Pain Rating 0/10 - no pain  -LR     Pain Location hip  -LR     Pain Side/Orientation right  -LR     Pain Management Interventions exercise or physical activity utilized;movement retraining implemented  -LR     Response to Pain Interventions activity level improved;mobility function improved;activity participation with tolerable pain;functional ability improved  -LR     Pre/Posttreatment Pain Comment reports pain improved with mobility today  -LR       Row Name 05/18/25 0836          Plan of Care Review    Plan of Care Reviewed With patient  -LR     Progress improving  -LR     Outcome Evaluation Patient increased gait distance with improved gait mechanics and less pain today. Patient currently below functional baseline, demonstrating decresaed functional mobility status, impaired balance, decreased endurance, and decreased strength/ROM. Will address these deficits to promote return to PLOF. Recommend IRF at d/c.  -LR       Row Name 05/18/25 0836          Therapy Assessment/Plan (PT)    Rehab Potential (PT) good  -LR     Criteria for Skilled Interventions Met (PT) yes;meets criteria;skilled treatment is necessary  -LR     Therapy Frequency (PT) 2 times/day  -LR       Row Name 05/18/25 0836          Positioning  and Restraints    Pre-Treatment Position in bed  -LR     Post Treatment Position chair  -LR     In Chair notified nsg;reclined;sitting;call light within reach;encouraged to call for assist;exit alarm on;legs elevated;compression device;waffle cushion  -LR               User Key  (r) = Recorded By, (t) = Taken By, (c) = Cosigned By      Initials Name Provider Type    Dorene Van, PT Physical Therapist                   Outcome Measures       Row Name 05/18/25 0836 05/18/25 0040       How much help from another person do you currently need...    Turning from your back to your side while in flat bed without using bedrails? 3  -LR 3  -MH    Moving from lying on back to sitting on the side of a flat bed without bedrails? 3  -LR 3  -MH    Moving to and from a bed to a chair (including a wheelchair)? 3  -LR 3  -MH    Standing up from a chair using your arms (e.g., wheelchair, bedside chair)? 3  -LR 3  -MH    Climbing 3-5 steps with a railing? 2  -LR 2  -MH    To walk in hospital room? 3  -LR 3  -MH    AM-PAC 6 Clicks Score (PT) 17  -LR 17  -MH    Highest Level of Mobility Goal Stand (1 or More Minutes)-5  -LR Stand (1 or More Minutes)-5  -MH      Row Name 05/18/25 0836          Functional Assessment    Outcome Measure Options AM-PAC 6 Clicks Basic Mobility (PT)  -LR               User Key  (r) = Recorded By, (t) = Taken By, (c) = Cosigned By      Initials Name Provider Type    Dorene Van, PT Physical Therapist    Adrienne Chambers RN Registered Nurse                                 Physical Therapy Education       Title: PT OT SLP Therapies (In Progress)       Topic: Physical Therapy (Done)       Point: Mobility training (Done)       Learning Progress Summary            Patient Acceptance, E,D, VU,NR by LR at 5/18/2025 0836    Comment: Educated on anterior hip precautions, weight bearing status, correct supine to sit t/f technique, correct sit<->stand t/f technique, correct gait mechanics,  HEP, and progression of POC.    Acceptance, E,D, VU,NR by LR at 5/17/2025 1425    Comment: Educated on anterior hip precautions, weight bearing status, HEP, correct sit to supine t/f technique, correct sit<->stand t/f technique, correct gait mechanics, and progression of POC.    Acceptance, E,D, VU,NR by LR at 5/17/2025 1040    Comment: Educated on anterior hip precautions, weight bearing status, HEP, safety with mobility, benefits of mobility, correct sit<->stand t/f technique, correct gait mechanics, and progression of POC.    Acceptance, E, VU,NR by LH at 5/16/2025 1613    Acceptance, E, VU,DU,NR by  at 5/16/2025 1121    Acceptance, E, VU by AE at 5/15/2025 1301    Acceptance, E, VU by AE at 5/15/2025 0812    Eager, E,D,H,TB, VU by SC at 5/14/2025 1819    Comment: reviewed HEP                      Point: Home exercise program (Done)       Learning Progress Summary            Patient Acceptance, E,D, VU,NR by LR at 5/18/2025 0836    Comment: Educated on anterior hip precautions, weight bearing status, correct supine to sit t/f technique, correct sit<->stand t/f technique, correct gait mechanics, HEP, and progression of POC.    Acceptance, E,D, VU,NR by LR at 5/17/2025 1425    Comment: Educated on anterior hip precautions, weight bearing status, HEP, correct sit to supine t/f technique, correct sit<->stand t/f technique, correct gait mechanics, and progression of POC.    Acceptance, E,D, VU,NR by LR at 5/17/2025 1040    Comment: Educated on anterior hip precautions, weight bearing status, HEP, safety with mobility, benefits of mobility, correct sit<->stand t/f technique, correct gait mechanics, and progression of POC.    Acceptance, E, VU,NR by LH at 5/16/2025 1613    Acceptance, E, VU,DU,NR by LH at 5/16/2025 1121    Acceptance, E, VU by AE at 5/15/2025 1301    Acceptance, E, VU by AE at 5/15/2025 0812    Eager, E,D,H,TB, VU by SC at 5/14/2025 3384    Comment: reviewed HEP                      Point: Body  mechanics (Done)       Learning Progress Summary            Patient Acceptance, E,D, VU,NR by LR at 5/18/2025 0836    Comment: Educated on anterior hip precautions, weight bearing status, correct supine to sit t/f technique, correct sit<->stand t/f technique, correct gait mechanics, HEP, and progression of POC.    Acceptance, E,D, VU,NR by LR at 5/17/2025 1425    Comment: Educated on anterior hip precautions, weight bearing status, HEP, correct sit to supine t/f technique, correct sit<->stand t/f technique, correct gait mechanics, and progression of POC.    Acceptance, E,D, VU,NR by LR at 5/17/2025 1040    Comment: Educated on anterior hip precautions, weight bearing status, HEP, safety with mobility, benefits of mobility, correct sit<->stand t/f technique, correct gait mechanics, and progression of POC.    Acceptance, E, VU,NR by LH at 5/16/2025 1613    Acceptance, E, VU,DU,NR by  at 5/16/2025 1121    Acceptance, E, VU by AE at 5/15/2025 1301    Acceptance, E, VU by AE at 5/15/2025 0812    Eager, E,D,H,TB, VU by SC at 5/14/2025 1819    Comment: reviewed HEP                      Point: Precautions (Done)       Learning Progress Summary            Patient Acceptance, E,D, VU,NR by LR at 5/18/2025 0836    Comment: Educated on anterior hip precautions, weight bearing status, correct supine to sit t/f technique, correct sit<->stand t/f technique, correct gait mechanics, HEP, and progression of POC.    Acceptance, E,D, VU,NR by LR at 5/17/2025 1425    Comment: Educated on anterior hip precautions, weight bearing status, HEP, correct sit to supine t/f technique, correct sit<->stand t/f technique, correct gait mechanics, and progression of POC.    Acceptance, E,D, VU,NR by LR at 5/17/2025 1040    Comment: Educated on anterior hip precautions, weight bearing status, HEP, safety with mobility, benefits of mobility, correct sit<->stand t/f technique, correct gait mechanics, and progression of POC.    Acceptance, E, VU,NR by   at 5/16/2025 1613    Acceptance, E, VU,DU,NR by  at 5/16/2025 1121    Acceptance, E, VU by AE at 5/15/2025 1301    Acceptance, E, VU by AE at 5/15/2025 0812    Eager, RONNA,D,H,TB, VU by SC at 5/14/2025 1819    Comment: reviewed HEP                                      User Key       Initials Effective Dates Name Provider Type Discipline    SC 02/03/23 -  Yanni Clark, PT Physical Therapist PT    LR 02/03/23 -  Dorene Perales, PT Physical Therapist PT    AE 09/21/21 -  Dean Bautista, PT Physical Therapist PT     09/21/23 -  Pamella Hui, PT Physical Therapist PT                  PT Recommendation and Plan     Progress: improving  Outcome Evaluation: Patient increased gait distance with improved gait mechanics and less pain today. Patient currently below functional baseline, demonstrating decresaed functional mobility status, impaired balance, decreased endurance, and decreased strength/ROM. Will address these deficits to promote return to PLOF. Recommend IRF at d/c.     Time Calculation:         PT Charges       Row Name 05/18/25 0836             Time Calculation    Start Time 0836  -LR      PT Received On 05/18/25  -LR      PT Goal Re-Cert Due Date 05/24/25  -LR         Timed Charges    14474 - PT Therapeutic Exercise Minutes 10  -LR      46553 - Gait Training Minutes  10  -LR      95225 - PT Therapeutic Activity Minutes 3  -LR         Total Minutes    Timed Charges Total Minutes 23  -LR       Total Minutes 23  -LR                User Key  (r) = Recorded By, (t) = Taken By, (c) = Cosigned By      Initials Name Provider Type    LR Dorene Perales, PT Physical Therapist                  Therapy Charges for Today       Code Description Service Date Service Provider Modifiers Qty    38521527910 HC PT THER PROC EA 15 MIN 5/17/2025 Dorene Perales, PT GP 1    06024407689 HC GAIT TRAINING EA 15 MIN 5/17/2025 Dorene Perales, PT GP 1    25304889578 HC PT THER PROC EA 15 MIN  5/17/2025 Dorene Perales, PT GP 1    85584273838 HC GAIT TRAINING EA 15 MIN 5/17/2025 Dorene Perales, PT GP 1    98593569779 HC PT THER PROC EA 15 MIN 5/18/2025 Dorene Perales, PT GP 1    45770965087 HC GAIT TRAINING EA 15 MIN 5/18/2025 Dorene Perales, PT GP 1            PT G-Codes  Outcome Measure Options: AM-PAC 6 Clicks Basic Mobility (PT)  AM-PAC 6 Clicks Score (PT): 17  AM-PAC 6 Clicks Score (OT): 21  PT Discharge Summary  Anticipated Discharge Disposition (PT): inpatient rehabilitation facility    Dorene Perales, PT  5/18/2025

## 2025-05-18 NOTE — DISCHARGE PLACEMENT REQUEST
"Khushbu Bledsoe (89 y.o. Female)     Zoe [phone]      Date of Birth   1934    Social Security Number       Address   [address] [place] [address]    Home Phone   [phone]    MRN   [id number]       Faith   Sikh    Marital Status                               Admission Date   2025    Admission Type   Elective    Admitting Provider   Julian Mcgee MD    Attending Provider   Julian Mcgee MD    Department, Room/Bed   [facility] 3G, S359/1       Discharge Date       Discharge Disposition   Rehab Facility or Unit (DC - External)    Discharge Destination                                 Attending Provider: Julian Mcgee MD    Allergies: No Known Allergies    Isolation: None   Infection: None   Code Status: No CPR    Ht: 152.4 cm (60\")   Wt: 60.8 kg (134 lb)    Admission Cmt: None   Principal Problem: Status post total replacement of right hip [Z96.641]                   Active Insurance as of 2025       Primary Coverage       Payor Plan Insurance Group Employer/Plan Group    [employer] MEDICARE REPLACEMENT UHC Medicare Advantage GROUP PPO [id number]       Payor Plan Address Payor Plan Phone Number Payor Plan Fax Number Effective Dates    [address] [address]   2024 - None Entered    [facility] [id number]         Subscriber Name Subscriber Birth Date Member ID       KHUSHBU BLEDSOE 3/17/1934 [id number]                     Emergency Contacts        (Rel.) Home Phone Work Phone Mobile Phone    CELIA BLEDSOE (Son) [phone] -- [phone]    Mallory Bledsoedi (Daughter) [phone] -- [phone]    Meet Armstrong (Son) [phone] -- [phone]                 Discharge Summary        Ninoska Mendez MD at 25 1156          Patient Name: Khushbu Bledsoe  MRN: [id number]  : 3/17/1934  DOS: 2025    Attending: Julian Mcgee MD    Primary Care Provider: Ana Ross MD    Date of " Admission:.5/14/2025  7:03 AM    Date of Discharge:  5/18/2025    Discharge Diagnosis:   Status post total replacement of right hip    AF (paroxysmal atrial fibrillation)    Essential hypertension    CKD (chronic kidney disease) stage 4, GFR 15-29 ml/min    Arthritis of right hip    CHF (congestive heart failure)    Elevated hemoglobin A1c      Hospital Course  At admit:  Patient is a pleasant 89 y.o. female presented for scheduled surgery by Dr. Mcgee. She underwent right total hip arthroplasty under spinal anesthesia.  She tolerated surgery well and was admitted for further medical management.  Her hip has been painful for several years.  She uses a walker with ambulation.  She denies recent falls.     When seen in PACU she is doing well.  Her pain is well-controlled.  She denies nausea, shortness of breath or chest pain.  No history of DVT or PE.     She has a history of hypertension, congestive heart failure and atrial fibrillation.  She is on chronic Eliquis.  She is followed by Dr. Mesa and had preop cardiac clearance.     After admit  Patient was provided pain medications as needed for pain control.    Adjustments were made to pain medications to optimize postop pain management when indicated. Risks and benefits of opiate medications discussed with patient. TRICIA report was reviewed.    She was seen by PT  and has progressed well over her stay.IPR was recommended.     She used an IS for atelectasis prophylaxis and Apixaban( started at 2.5 mg bid on POD 1) along with mechanicals for DVT prophylaxis.    Home medications were resumed as appropriate, and labs were monitored , she required PRBC transfusion, H/H stable post transfusion.     With the progress she has made, pt is ready for DC to [facility] today.      Discussed with patient regarding plan and she shows understanding and agreement.      Patient will have  PT following discharge.        Procedures Performed  Procedure(s):  TOTAL HIP ARTHROPLASTY  "ANTERIOR RIGHT       Pertinent Test Results:    I reviewed the patient's new clinical results.   Results from last 7 days   Lab Units 25  1916 25  0741 05/15/25  1021   WBC 10*3/mm3 7.51  --  10.58   HEMOGLOBIN g/dL 9.8* 6.3* 7.0*   HEMATOCRIT % 30.0* 20.5* 22.4*   PLATELETS 10*3/mm3 193  --  224     Results from last 7 days   Lab Units 25  0741 05/15/25  1021   SODIUM mmol/L 139 137   POTASSIUM mmol/L 4.6 4.9   CHLORIDE mmol/L 108* 104   CO2 mmol/L 23.0 20.0*   BUN mg/dL 46* 58*   CREATININE mg/dL 1.41* 1.83*   CALCIUM mg/dL 8.2 8.1*   GLUCOSE mg/dL 119* 186*     I reviewed the patient's new imaging including images and reports.      Physical therapy  Dorene Perales, PT   Physical Therapist  Physical Therapy     Plan of Care     Signed     Date of Service: 25  Creation Time: 25     Signed         Goal Outcome Evaluation:  Plan of Care Reviewed With: patient  Progress: improving  Outcome Evaluation: Patient increased gait distance with improved gait mechanics and less pain today. Patient currently below functional baseline, demonstrating decresaed functional mobility status, impaired balance, decreased endurance, and decreased strength/ROM. Will address these deficits to promote return to PLOF. Recommend IRF at d/c.     Anticipated Discharge Disposition (PT): inpatient rehabilitation facility              Discharge Assessment:      Visit Vitals  /74 (BP Location: Right arm, Patient Position: Lying)   Pulse 71   Temp 97.7 °F (36.5 °C) (Oral)   Resp 18   Ht 152.4 cm (60\")   Wt 60.8 kg (134 lb)   SpO2 96%   BMI 26.17 kg/m²     Temp (24hrs), Av.8 °F (36.6 °C), Min:97.5 °F (36.4 °C), Max:98.1 °F (36.7 °C)      General Appearance:    Alert, cooperative, in no acute distress   Lungs:     Clear to auscultation,respirations regular, even and                   unlabored    Heart:    Regular rhythm and normal rate, normal S1 and S2   Abdomen:     Normal bowel sounds, no " masses, no organomegaly, soft        non-tender, non-distended, no guarding, no rebound   tenderness   Extremities:   CDI dressing over hip incision.    Pulses:   Pulses palpable and equal bilaterally   Skin:   No bleeding, bruising or rash   Neurologic:   Cranial nerves 2 - 12 grossly intact, sensation intact, Flexion and dorsiflexion intact bilateral feet.         Discharge Disposition: [facility].         Discharge Medications        PAUSE taking these medications        Instructions Start Date   Eliquis 2.5 MG tablet tablet  Wait to take this until: May 22, 2025  To resume after finishing 1 week of 2.5 mg twice daily  Generic drug: apixaban   2.5 mg, Oral, Every 12 Hours Scheduled  You also have another medication with the same name that you may need to continue taking.             New Medications        Instructions Start Date   polyethylene glycol 17 g packet  Commonly known as: MIRALAX   17 g, Oral, Daily PRN             Changes to Medications        Instructions Start Date   apixaban 2.5 MG tablet tablet  Commonly known as: Eliquis  What changed: Another medication with the same name was paused. Ask your nurse or doctor if you should take this medication.   2.5 mg, Oral, Every 12 Hours Scheduled, Then resume home dose.      traMADol 50 MG tablet  Commonly known as: ULTRAM  What changed: how much to take   50 mg, Oral, Every 6 Hours             Continue These Medications        Instructions Start Date   acetaminophen 500 MG tablet  Commonly known as: TYLENOL   1,000 mg, Oral, 3 times daily      albuterol sulfate  (90 Base) MCG/ACT inhaler  Commonly known as: PROVENTIL HFA;VENTOLIN HFA;PROAIR HFA   2 puffs, Inhalation, Every 4 Hours PRN      bumetanide 1 MG tablet  Commonly known as: BUMEX   1 mg, Daily      carvedilol 6.25 MG tablet  Commonly known as: COREG   6.25 mg, Oral, 2 Times Daily With Meals      cefadroxil 500 MG capsule  Commonly known as: DURICEF   Take 1 capsule by mouth every 12 hours for 3  days      Vitamin D3 50 MCG (2000 UT) tablet   50 mcg, Oral, Daily      Cholecalciferol 125 MCG (5000 UT) tablet   5,000 Units, Daily      docusate sodium 100 MG capsule  Commonly known as: Colace   100-200 mg, Oral, Daily PRN      fish oil 1200 MG capsule capsule   1,200 mg, 2 Times Daily With Meals      gabapentin 100 MG capsule  Commonly known as: NEURONTIN   100 mg, 3 Times Daily      hydrALAZINE 50 MG tablet  Commonly known as: APRESOLINE   50 mg, Oral, 3 Times Daily      ipratropium 17 MCG/ACT inhaler  Commonly known as: ATROVENT HFA   2 puffs, Inhalation, 4 Times Daily - RT      lisinopril 40 MG tablet  Commonly known as: PRINIVIL,ZESTRIL   40 mg, Oral, Every Morning      Multi-Vitamin Daily tablet tablet   1 tablet, Daily      NIFEdipine XL 30 MG 24 hr tablet  Commonly known as: PROCARDIA XL   30 mg, Oral, Daily      ondansetron 4 MG tablet  Commonly known as: ZOFRAN   4 mg, Oral, Every 6 Hours PRN      pantoprazole 40 MG EC tablet  Commonly known as: PROTONIX   40 mg, Oral, Every Morning Before Breakfast      PRESERVISION AREDS 2 PO   1 capsule, 2 Times Daily      spironolactone 12.5 MG tablet half tablet  Commonly known as: ALDACTONE   12.5 mg, Daily             Stop These Medications      oxyCODONE 5 MG immediate release tablet  Commonly known as: ROXICODONE     Tranexamic Acid 650 MG tablet              Discharge Diet: Cardiac.     Activity at Discharge:   Weight bearing as tolerated         Future Appointments   Date Time Provider Department Center   6/12/2025 10:30 AM Ana Ross MD [facility]   2/23/2026  3:45 PM Prosper Mesa MD [facility]      June 24 at 1 PM with Tavon KENNEDY PA-C Dragon disclaimer:  Part of this encounter note is an electronic transcription/translation of spoken language to printed text. The electronic translation of spoken language may permit erroneous, or at times, nonsensical words or phrases to be inadvertently transcribed; Although I have  reviewed the note for such errors, some may still exist.       Ninoska Mendez MD  05/18/25  11:56 EDT              Electronically signed by Ninoska Mendez MD at 05/18/25 1206

## 2025-05-23 RX ORDER — SPIRONOLACTONE 25 MG/1
TABLET ORAL
Qty: 90 TABLET | Refills: 1 | Status: SHIPPED | OUTPATIENT
Start: 2025-05-23

## 2025-06-04 ENCOUNTER — OFFICE VISIT (OUTPATIENT)
Dept: INTERNAL MEDICINE | Facility: CLINIC | Age: OVER 89
End: 2025-06-04
Payer: MEDICARE

## 2025-06-04 VITALS
HEART RATE: 73 BPM | WEIGHT: 139 LBS | DIASTOLIC BLOOD PRESSURE: 64 MMHG | HEIGHT: 60 IN | OXYGEN SATURATION: 95 % | SYSTOLIC BLOOD PRESSURE: 152 MMHG | TEMPERATURE: 98.5 F | RESPIRATION RATE: 18 BRPM | BODY MASS INDEX: 27.29 KG/M2

## 2025-06-04 DIAGNOSIS — I48.0 AF (PAROXYSMAL ATRIAL FIBRILLATION): ICD-10-CM

## 2025-06-04 DIAGNOSIS — M16.11 ARTHRITIS OF RIGHT HIP: Primary | ICD-10-CM

## 2025-06-04 DIAGNOSIS — Z96.641 STATUS POST TOTAL REPLACEMENT OF RIGHT HIP: ICD-10-CM

## 2025-06-04 DIAGNOSIS — N18.4 CKD (CHRONIC KIDNEY DISEASE) STAGE 4, GFR 15-29 ML/MIN: ICD-10-CM

## 2025-06-04 NOTE — PROGRESS NOTES
"    Office Note     Name: Khushbu Bledsoe    : 3/17/1934     MRN: [id number]     Chief Complaint  Hospital Follow Up Visit (DC )    Subjective     History of Present Illness:  Khushbu Bledsoe is a 89 y.o. female who presents today for follow up after right hip replacement.    Follows with HH for PT and OT. Pain control tramadol by ortho, follow up next week. Uses a walker, which is her baseline  Doing well, no difficulty breathing, chest pain or palpitations. Continue eliquis, uses incentive spirometry      Family History:   Family History   Problem Relation Age of Onset    Heart attack Brother     Cancer Son     Arthritis Son     Heart disease Son        Social History:   Social History     Socioeconomic History    Marital status:    Tobacco Use    Smoking status: Never    Smokeless tobacco: Never   Vaping Use    Vaping status: Never Used   Substance and Sexual Activity    Alcohol use: Not Currently    Drug use: Never    Sexual activity: Not Currently     Partners: Male     Birth control/protection: None       Health Maintenance   Topic Date Due    TDAP/TD VACCINES (1 - Tdap) Never done    Pneumococcal Vaccine 50+ (2 of 2 - PPSV23) 2015    DXA SCAN  2018    COVID-19 Vaccine ( - -25 season) 2025 (Originally 2024)    RSV Vaccine - Adults (1 - 1-dose 75+ series) 2025 (Originally 3/17/2009)    INFLUENZA VACCINE  2025    ANNUAL WELLNESS VISIT  2026    ZOSTER VACCINE  Completed       Patient Care Team:  Ana Ross MD as PCP - General (Family Medicine)  Van Ch MD as Consulting Physician (Interventional Cardiology)  Siva Ley MD as Consulting Physician (Pain Medicine)    Objective     Vital Signs  /64   Pulse 73   Temp 98.5 °F (36.9 °C) (Infrared)   Resp 18   Ht 152.4 cm (60\")   Wt 63 kg (139 lb)   SpO2 95%   BMI 27.15 kg/m²   Estimated body mass index is 27.15 kg/m² as calculated from the following:    Height " "as of this encounter: 152.4 cm (60\").    Weight as of this encounter: 63 kg (139 lb).        Physical Exam  Vitals and nursing note reviewed.   Constitutional:       Appearance: Normal appearance.      Comments: Uses a walker   HENT:      Head: Normocephalic and atraumatic.   Cardiovascular:      Rate and Rhythm: Normal rate and regular rhythm.      Pulses: Normal pulses.      Heart sounds: Normal heart sounds.   Pulmonary:      Effort: Pulmonary effort is normal. No respiratory distress.      Breath sounds: Normal breath sounds. No wheezing, rhonchi or rales.   Abdominal:      General: Abdomen is flat. Bowel sounds are normal.      Palpations: Abdomen is soft.      Tenderness: There is no abdominal tenderness. There is no guarding.   Musculoskeletal:      Cervical back: Neck supple.   Skin:     General: Skin is warm.      Capillary Refill: Capillary refill takes less than 2 seconds.   Neurological:      General: No focal deficit present.      Mental Status: She is alert. Mental status is at baseline.   Psychiatric:         Mood and Affect: Mood normal.         Behavior: Behavior normal.          Procedures     Assessment and Plan     Diagnoses and all orders for this visit:    1. Arthritis of right hip (Primary)    2. AF (paroxysmal atrial fibrillation)    3. Status post total replacement of right hip    4. CKD (chronic kidney disease) stage 4, GFR 15-29 ml/min     Records, procedure note reviewed  Doing well, continue PT and OT  Follow with ortho  Pain control per ortho  Continue eliquis as prescribed, follows cardiology (Moshe)  PPI as prescribed  Incentive spirometry advised    Counseling was given to patient for the following topics: instructions for management, risks and benefits of treatment options, and importance of treatment compliance.    Follow Up  Return in about 4 months (around 10/4/2025) for 6 month follow up.    MD SAMANTHA Wilcox [facility] PRIMARY " [address] [address] [phone]

## 2025-06-09 ENCOUNTER — OUTSIDE FACILITY SERVICE (OUTPATIENT)
Dept: INTERNAL MEDICINE | Facility: CLINIC | Age: OVER 89
End: 2025-06-09
Payer: MEDICARE

## 2025-06-24 ENCOUNTER — TRANSCRIBE ORDERS (OUTPATIENT)
Dept: PHYSICAL THERAPY | Facility: CLINIC | Age: OVER 89
End: 2025-06-24
Payer: MEDICARE

## 2025-06-24 DIAGNOSIS — Z96.641 S/P REVISION OF RIGHT TOTAL HIP: Primary | ICD-10-CM

## 2025-07-01 RX ORDER — HYDRALAZINE HYDROCHLORIDE 50 MG/1
50 TABLET, FILM COATED ORAL 3 TIMES DAILY
Qty: 270 TABLET | Refills: 1 | Status: SHIPPED | OUTPATIENT
Start: 2025-07-01

## 2025-07-01 RX ORDER — NIFEDIPINE 30 MG/1
30 TABLET, EXTENDED RELEASE ORAL DAILY
Qty: 90 TABLET | Refills: 1 | Status: SHIPPED | OUTPATIENT
Start: 2025-07-01

## 2025-07-01 NOTE — TELEPHONE ENCOUNTER
Wrote patient back in [facility] that she will need to reach out to cardiology for these refills.

## 2025-07-08 ENCOUNTER — TREATMENT (OUTPATIENT)
Dept: PHYSICAL THERAPY | Facility: CLINIC | Age: OVER 89
End: 2025-07-08
Payer: MEDICARE

## 2025-07-08 DIAGNOSIS — R26.89 BALANCE PROBLEM: Primary | ICD-10-CM

## 2025-07-08 PROCEDURE — 97161 PT EVAL LOW COMPLEX 20 MIN: CPT | Performed by: PHYSICAL THERAPIST

## 2025-07-08 NOTE — PROGRESS NOTES
Physical Therapy Initial Evaluation and Plan of Care   [address] [place] [address]      Patient: Khushbu Bledsoe   : 3/17/1934  Diagnosis/ICD-10 Code:  Balance problem [R26.89]  Referring practitioner: Tavon Richards PA-C    Subjective Evaluation    History of Present Illness  Date of surgery: 2025  Mechanism of injury: Pt reports that she is having a lot of unsteadiness and shakiness when up and moving. Pt reports that she feels weak. Pt states that she had a R hip replacement in May of this year. Pt reports that she did rehab at [facility]. Pt states that she is using a rolling walker. Pt states that she had a fall a few weeks ago because she got very light headed after sitting in the heat for a prolonged period. Pt reports that she is living with her adult children and does not have to do more than 1 step to get in and out of the house. Pt reports her kids get onto her to walk inside her walker more. Pt reports that she was doing some exercise at the house after [facility] but has not been doing them as much.     Pain  Aggravating factors: ambulation, squatting, stairs and standing    Social Support  Lives in: one-story house  Lives with: adult children    Treatments  Previous treatment: physical therapy  Patient Goals  Patient goals for therapy: improved balance and increased strength           Objective          Strength/Myotome Testing     Left Hip   Planes of Motion   Extension: 4-    Right Hip   Planes of Motion   Extension: 3+    Left Knee   Flexion: 4-  Extension: 4-    Right Knee   Flexion: 4-  Extension: 4-    Left Ankle/Foot   Plantar flexion: 4-    Right Ankle/Foot   Plantar flexion: 4-    Ambulation     Ambulation: Stairs   Ascend stairs: minimum assist  Railings: one rail  Descend stairs: moderate assist  Railings: one rail    Additional Stairs Ambulation Details  Weakness and decreased control noted when lowering on the R LE.     Observational Gait    Decreased walking speed and stride length.     Quality of Movement During Gait     Additional Quality of Movement During Gait Details  Pt with shuffling gait    Functional Assessment     Comments  TU.68 seconds with RW  5x sit to stand: 19.78 seconds with min assist          Assessment & Plan       Assessment  Impairments: abnormal gait, abnormal or restricted ROM, activity intolerance, impaired balance, impaired physical strength and lacks appropriate home exercise program   Functional limitations: walking, uncomfortable because of pain, standing and stooping   Assessment details: Patient is a 89 year old female who comes to physical therapy with decreased LE strength and poor balance with history or R PETE. Signs and symptoms are consistent with hip and LE strength deficits, deconditioning, and chronic immobility resulting in decreased ROM, decreased strength, and inability to perform all essential functional activities. Pt will benefit from skilled PT services to address the above issues.       Prognosis: good    Goals  Plan Goals: Short Term Goals (2 weeks)  1. Pt will demonstrate independence with HEP  2. Pt will be able to perform sit to stand without UE assist to demonstrate improved LE strength  3. Pt will increase LE strength with MMT to 4/5 with all motions to improve functional mobility    Long term goals (6 weeks)  1. Pt will be able to perform TUG with RW in <12 seconds to show decreased fall risk.   2. Pt will be able to perform 5x sit to stand within 15 seconds to show increased LE strength  3. Pt will be able to perform all normal household activities without LOB    Plan  Therapy options: will be seen for skilled therapy services  Planned therapy interventions: balance/weight-bearing training, flexibility, functional ROM exercises, home exercise program, joint mobilization, manual therapy, motor coordination training, neuromuscular re-education, soft tissue mobilization, strengthening,  spinal/joint mobilization, stretching and therapeutic activities  Frequency: 2x week  Duration in weeks: 8  Treatment plan discussed with: patient        Timed Treatment:  Manual Therapy:         mins  50963;  Therapeutic Exercise:         mins  59638;     Neuromuscular Carli:        mins  13752;    Therapeutic Activity:          mins  80141;     Gait Training:           mins  50231;     Ultrasound:          mins  74727;    Electrical Stimulation:         mins  40912 ( );  Dry Needling          mins self-pay  Iontophoresis          mins 97588    Untimed Treatments:  Electrical Stimulation:         mins  46930 ( );  Dry Needling:                     mins  Ultrasound:                         mins  75930;      Timed Treatment:      mins   Total Treatment:     45   mins    PT SIGNATURE: Javid Campoverde PT   [place] License: [id number]  DATE TREATMENT INITIATED: 7/8/2025    Initial Certification  Certification Period: 10/5/2025  I certify that the therapy services are furnished while this patient is under my care.  The services outlined above are required by this patient, and will be reviewed every 90 days.     PHYSICIAN: Tavon Richards PA-C      DATE:     Please sign and return via fax to [phone].. Thank you, [facility] Physical Therapy.

## 2025-07-15 ENCOUNTER — TREATMENT (OUTPATIENT)
Dept: PHYSICAL THERAPY | Facility: CLINIC | Age: OVER 89
End: 2025-07-15
Payer: MEDICARE

## 2025-07-15 DIAGNOSIS — R26.89 BALANCE PROBLEM: Primary | ICD-10-CM

## 2025-07-15 PROCEDURE — 97530 THERAPEUTIC ACTIVITIES: CPT | Performed by: PHYSICAL THERAPIST

## 2025-07-15 PROCEDURE — 97110 THERAPEUTIC EXERCISES: CPT | Performed by: PHYSICAL THERAPIST

## 2025-07-15 PROCEDURE — 97112 NEUROMUSCULAR REEDUCATION: CPT | Performed by: PHYSICAL THERAPIST

## 2025-07-15 NOTE — PROGRESS NOTES
Physical Therapy Daily Treatment Note      Visit #: 2    Khushbu Bledsoe reports having an ablation on the 7th to help her back pain which did not seem to make a difference. Pt reports that she has been doing her HEP without much noticeable difference.         Objective Pt present to PT today with no distress at rest.     Pt did well with ambulation with contact guard without AD today without LOB although presents with shuffling gait with reduced L hip mobility and pain reported in the low back.     Pt with no issues with strengthening and balance activities performed today beside back pain reported when standing for longer than a few mins.       See Exercise, Manual, and Modality Logs for complete treatment.     Assessment/Plan  Pt continues to have limited function and mobility due to weakness and low back pain. Pt to continue with PT to help improve LE strength and increase safety with daily mobility around the house and community.       Progress per Plan of Care      Visit Diagnosis:    ICD-10-CM ICD-9-CM   1. Balance problem  R26.89 781.99              Timed Treatment:  Manual Therapy:         mins  43491;  Therapeutic Exercise:    14     mins  38007;     Neuromuscular Carli:    10    mins  90205;    Therapeutic Activity:     14     mins  83631;     Gait Training:           mins  93714;     Ultrasound:          mins  60101;    Electrical Stimulation:         mins  24926 ( );  Dry Needling          mins self-pay  Iontophoresis          mins 18897    Untimed Treatments:  Electrical Stimulation:         mins  91549 ( );  Dry Needling:                     mins  Ultrasound:                         mins  51465;        Timed Treatment:   38   mins   Total Treatment:     47   mins    Javid Campoverde, PT  Physical Therapist

## 2025-07-17 ENCOUNTER — TREATMENT (OUTPATIENT)
Dept: PHYSICAL THERAPY | Facility: CLINIC | Age: OVER 89
End: 2025-07-17
Payer: MEDICARE

## 2025-07-17 DIAGNOSIS — R26.89 BALANCE PROBLEM: Primary | ICD-10-CM

## 2025-07-17 PROCEDURE — 97110 THERAPEUTIC EXERCISES: CPT | Performed by: PHYSICAL THERAPIST

## 2025-07-17 PROCEDURE — 97112 NEUROMUSCULAR REEDUCATION: CPT | Performed by: PHYSICAL THERAPIST

## 2025-07-17 PROCEDURE — 97530 THERAPEUTIC ACTIVITIES: CPT | Performed by: PHYSICAL THERAPIST

## 2025-07-17 NOTE — PROGRESS NOTES
Physical Therapy Daily Treatment Note      Visit #: 3    Khushbu Bledsoe reports that she was fatigued after last session but everything seemed to go well.         Objective Pt present to PT today with no distress at rest.     Pt reports being wobbly while walking with contact guard from PT. Mild drifting noted with ambulation although no LOB.     Pt did well with strengthening exercises for hips and LEs without notable issues.       See Exercise, Manual, and Modality Logs for complete treatment.     Assessment/Plan  Pt continues to use RW to ambulate around the house and community. Pt to continue with HEP and activities in the PT clinic. Pt to continue as tolerated next week to improve LE strength, stability, and function for improve balance and activity tolerance.       Progress per Plan of Care      Visit Diagnosis:    ICD-10-CM ICD-9-CM   1. Balance problem  R26.89 781.99              Timed Treatment:  Manual Therapy:         mins  51523;  Therapeutic Exercise:    13     mins  07500;     Neuromuscular Carli:    10    mins  08333;    Therapeutic Activity:     15     mins  02938;     Gait Training:           mins  81427;     Ultrasound:          mins  00016;    Electrical Stimulation:         mins  72130 ( );  Dry Needling          mins self-pay  Iontophoresis          mins 79791    Untimed Treatments:  Electrical Stimulation:         mins  63993 (MC );  Dry Needling:                     mins  Ultrasound:                         mins  22072;        Timed Treatment:   38   mins   Total Treatment:     52   mins    Javid Campoverde, PT  Physical Therapist

## 2025-07-21 ENCOUNTER — TREATMENT (OUTPATIENT)
Dept: PHYSICAL THERAPY | Facility: CLINIC | Age: OVER 89
End: 2025-07-21
Payer: MEDICARE

## 2025-07-21 DIAGNOSIS — R26.89 BALANCE PROBLEM: Primary | ICD-10-CM

## 2025-07-21 PROCEDURE — 97110 THERAPEUTIC EXERCISES: CPT | Performed by: PHYSICAL THERAPIST

## 2025-07-21 PROCEDURE — 97112 NEUROMUSCULAR REEDUCATION: CPT | Performed by: PHYSICAL THERAPIST

## 2025-07-21 PROCEDURE — 97530 THERAPEUTIC ACTIVITIES: CPT | Performed by: PHYSICAL THERAPIST

## 2025-07-21 NOTE — PROGRESS NOTES
Physical Therapy Daily Treatment Note      Visit #: 4    Khushbu Bledsoe reports 0/10 pain today at rest.  Pt reports that her legs have been giving out on her the last couple weeks (first time mentioning to PT). Pt reports that this happens any time and sometimes her arms give out as well. Pt reports that sometimes she does fine without any issues walking or WB around the house and community. Pt reports that there is no consistency to these spells.         Objective Pt present to PT today with no distress at rest.     Pt with no giving way of LEs with activities today.     Pt with moderate back pain with activities today while standing.       See Exercise, Manual, and Modality Logs for complete treatment.     Assessment/Plan  Pt continues to have issues with weakness and new complaint of legs giving way. Pt provided with exercises to hopefully reduce buckling of knees and will follow up later this week to progress as tolerated with activities in the clinic to improve functional mobility, strength, and standing tolerance.       Progress per Plan of Care      Visit Diagnosis:    ICD-10-CM ICD-9-CM   1. Balance problem  R26.89 781.99              Timed Treatment:  Manual Therapy:         mins  60368;  Therapeutic Exercise:    14     mins  35702;     Neuromuscular Carli:    14    mins  53813;    Therapeutic Activity:     10     mins  16353;     Gait Training:           mins  86731;     Ultrasound:          mins  12834;    Electrical Stimulation:         mins  44625 ( );  Dry Needling          mins self-pay  Iontophoresis          mins 06883    Untimed Treatments:  Electrical Stimulation:         mins  15545 (MC );  Dry Needling:                     mins  Ultrasound:                         mins  87382;        Timed Treatment:   38   mins   Total Treatment:     50   mins    Javid Campoverde, PT  Physical Therapist

## 2025-07-24 ENCOUNTER — TREATMENT (OUTPATIENT)
Dept: PHYSICAL THERAPY | Facility: CLINIC | Age: OVER 89
End: 2025-07-24
Payer: MEDICARE

## 2025-07-24 DIAGNOSIS — R26.89 BALANCE PROBLEM: Primary | ICD-10-CM

## 2025-07-24 PROCEDURE — 97530 THERAPEUTIC ACTIVITIES: CPT | Performed by: PHYSICAL THERAPIST

## 2025-07-24 PROCEDURE — 97110 THERAPEUTIC EXERCISES: CPT | Performed by: PHYSICAL THERAPIST

## 2025-07-24 PROCEDURE — 97112 NEUROMUSCULAR REEDUCATION: CPT | Performed by: PHYSICAL THERAPIST

## 2025-07-24 NOTE — PROGRESS NOTES
Physical Therapy Daily Treatment Note      Visit #: 5    Khushbu Bledsoe reports that she has been doing well the last couple days and has not had her legs give out while standing or walking.         Objective Pt present to PT today with no distress at rest.     Pt with back pain reported with standing more than a few minutes with activities today.    Pt with no buckling of knees during ambulation today although did veer several times without intention to.     Pt with no issues with activities for LE strength today besides back pain that resolved with sitting.       See Exercise, Manual, and Modality Logs for complete treatment.     Assessment/Plan  Pt continues to have limited functional mobility due to weakness in the hips and LEs. Pt to continue with activities in the clinic to help improve balance, activity tolerance, and mobility around the house and community.       Progress per Plan of Care      Visit Diagnosis:    ICD-10-CM ICD-9-CM   1. Balance problem  R26.89 781.99              Timed Treatment:  Manual Therapy:         mins  04416;  Therapeutic Exercise:    16     mins  48216;     Neuromuscular Carli:    10    mins  06086;    Therapeutic Activity:     12     mins  48851;     Gait Training:           mins  81309;     Ultrasound:          mins  99986;    Electrical Stimulation:         mins  50816 ( );  Dry Needling          mins self-pay  Iontophoresis          mins 10184    Untimed Treatments:  Electrical Stimulation:         mins  04785 ( );  Dry Needling:                     mins  Ultrasound:                         mins  26886;        Timed Treatment:   38   mins   Total Treatment:     49   mins    Javid Campoverde, PT  Physical Therapist

## 2025-07-31 ENCOUNTER — TREATMENT (OUTPATIENT)
Dept: PHYSICAL THERAPY | Facility: CLINIC | Age: OVER 89
End: 2025-07-31
Payer: MEDICARE

## 2025-07-31 ENCOUNTER — PATIENT MESSAGE (OUTPATIENT)
Dept: INTERNAL MEDICINE | Facility: CLINIC | Age: OVER 89
End: 2025-07-31
Payer: MEDICARE

## 2025-07-31 DIAGNOSIS — R26.89 BALANCE PROBLEM: Primary | ICD-10-CM

## 2025-07-31 NOTE — PROGRESS NOTES
Physical Therapy Daily Treatment Note      Visit #: 6    Khushbu Bledsoe reports 0/10 pain today at rest.  Pt states that she has been walking without feeling like legs are giving out on her. Pt states she still gets tired in B LE easily and has to take frequent rest breaks during walking or standing activity.     Objective Pt present to PT today with no distress at rest.    Pt performed seated and supine strengthening exercises well. Pt did report intermittent low back pain throughout exercises.    Pt had intermittent weaving gait during standing balance activities but was able to self correct with CGA from PT student. Pt reported B LE soreness and fatigue after standing balance activities.    See Exercise, Manual, and Modality Logs for complete treatment.     Assessment/Plan  Pt continues to have limited functional mobility due to B LE weakness. Pt has weaving gait with difficulty maintaining straight path when ambulating without rolling walker. Pt will benefit from continued PT treatment for strengthening and to improve functional mobility.      Progress per Plan of Care      Visit Diagnosis:    ICD-10-CM ICD-9-CM   1. Balance problem  R26.89 781.99              Timed Treatment:  Manual Therapy:         mins  34885;  Therapeutic Exercise:    12     mins  71535;     Neuromuscular Carli:    11   mins  20837;    Therapeutic Activity:     15     mins  04841;     Gait Training:           mins  30380;     Ultrasound:          mins  48865;    Electrical Stimulation:         mins  48149 ( );  Dry Needling          mins self-pay  Iontophoresis          mins 95152    Untimed Treatments:  Electrical Stimulation:         mins  70178 ( );  Dry Needling:                     mins  Ultrasound:                         mins  67333;        Timed Treatment:   38   mins   Total Treatment:     50   mins    Timi Jara, PT Student

## 2025-07-31 NOTE — TELEPHONE ENCOUNTER
Called and spoke to pt son Ghassan.  Advised that Dr. Ross could see that an outside provider prescribed the gabapentin 6/1 for a 90 day fill and pt should have enough.  He states that [facility] notified them they were trying to refill the med.  Ghassan asked what provider does it say filled it and I advised I did not know as I cannot see that on my end, it may have shown in a Fredi report but I am not sure.  I advised to try calling [facility] and ask who the ordering provider was.

## 2025-08-01 NOTE — PROGRESS NOTES
I have reviewed the notes, assessments, and/or procedures performed by Timi Jara, I concur with her/his documentation of Khushbu Bledsoe.

## 2025-08-08 ENCOUNTER — TREATMENT (OUTPATIENT)
Dept: PHYSICAL THERAPY | Facility: CLINIC | Age: OVER 89
End: 2025-08-08
Payer: MEDICARE

## 2025-08-08 DIAGNOSIS — R26.89 BALANCE PROBLEM: Primary | ICD-10-CM

## 2025-08-08 PROCEDURE — 97112 NEUROMUSCULAR REEDUCATION: CPT | Performed by: PHYSICAL THERAPIST

## 2025-08-08 PROCEDURE — 97530 THERAPEUTIC ACTIVITIES: CPT | Performed by: PHYSICAL THERAPIST

## 2025-08-08 PROCEDURE — 97110 THERAPEUTIC EXERCISES: CPT | Performed by: PHYSICAL THERAPIST

## 2025-08-11 ENCOUNTER — TREATMENT (OUTPATIENT)
Dept: PHYSICAL THERAPY | Facility: CLINIC | Age: OVER 89
End: 2025-08-11
Payer: MEDICARE

## 2025-08-11 DIAGNOSIS — R26.89 BALANCE PROBLEM: Primary | ICD-10-CM

## 2025-08-11 PROCEDURE — 97530 THERAPEUTIC ACTIVITIES: CPT | Performed by: PHYSICAL THERAPIST

## 2025-08-11 PROCEDURE — 97110 THERAPEUTIC EXERCISES: CPT | Performed by: PHYSICAL THERAPIST

## 2025-08-14 ENCOUNTER — TELEPHONE (OUTPATIENT)
Dept: ORTHOPEDICS | Facility: OTHER | Age: OVER 89
End: 2025-08-14
Payer: MEDICARE

## 2025-08-14 ENCOUNTER — OFFICE VISIT (OUTPATIENT)
Dept: INTERNAL MEDICINE | Facility: CLINIC | Age: OVER 89
End: 2025-08-14
Payer: MEDICARE

## 2025-08-14 VITALS
DIASTOLIC BLOOD PRESSURE: 60 MMHG | HEART RATE: 65 BPM | OXYGEN SATURATION: 97 % | SYSTOLIC BLOOD PRESSURE: 137 MMHG | HEIGHT: 60 IN | BODY MASS INDEX: 26.7 KG/M2 | TEMPERATURE: 97.7 F | WEIGHT: 136 LBS | RESPIRATION RATE: 18 BRPM

## 2025-08-14 DIAGNOSIS — R25.1 OCCASIONAL TREMORS: Primary | ICD-10-CM

## 2025-08-14 LAB
ALBUMIN SERPL-MCNC: 4.1 G/DL (ref 3.5–5.2)
ALBUMIN/GLOB SERPL: 1.8 G/DL
ALP SERPL-CCNC: 180 U/L (ref 39–117)
ALT SERPL-CCNC: 33 U/L (ref 1–33)
AST SERPL-CCNC: 28 U/L (ref 1–32)
BILIRUB SERPL-MCNC: 0.3 MG/DL (ref 0–1.2)
BUN SERPL-MCNC: 58 MG/DL (ref 8–23)
BUN/CREAT SERPL: 30.7 (ref 7–25)
CALCIUM SERPL-MCNC: 9.5 MG/DL (ref 8.2–9.6)
CHLORIDE SERPL-SCNC: 105 MMOL/L (ref 98–107)
CO2 SERPL-SCNC: 22.8 MMOL/L (ref 22–29)
CREAT SERPL-MCNC: 1.89 MG/DL (ref 0.57–1)
EGFRCR SERPLBLD CKD-EPI 2021: 24.8 ML/MIN/1.73
ERYTHROCYTE [DISTWIDTH] IN BLOOD BY AUTOMATED COUNT: 14.3 % (ref 12.3–15.4)
GLOBULIN SER CALC-MCNC: 2.3 GM/DL
GLUCOSE SERPL-MCNC: 256 MG/DL (ref 65–99)
HCT VFR BLD AUTO: 34.5 % (ref 34–46.6)
HGB BLD-MCNC: 10.9 G/DL (ref 12–15.9)
MCH RBC QN AUTO: 27.7 PG (ref 26.6–33)
MCHC RBC AUTO-ENTMCNC: 31.6 G/DL (ref 31.5–35.7)
MCV RBC AUTO: 87.8 FL (ref 79–97)
PLATELET # BLD AUTO: 275 10*3/MM3 (ref 140–450)
POTASSIUM SERPL-SCNC: 4.8 MMOL/L (ref 3.5–5.2)
PROT SERPL-MCNC: 6.4 G/DL (ref 6–8.5)
RBC # BLD AUTO: 3.93 10*6/MM3 (ref 3.77–5.28)
SODIUM SERPL-SCNC: 140 MMOL/L (ref 136–145)
WBC # BLD AUTO: 6.36 10*3/MM3 (ref 3.4–10.8)

## 2025-08-14 RX ORDER — BUMETANIDE 1 MG/1
1 TABLET ORAL DAILY
Status: CANCELLED | OUTPATIENT
Start: 2025-08-14

## 2025-08-14 RX ORDER — BUMETANIDE 1 MG/1
1 TABLET ORAL DAILY
Qty: 90 TABLET | Refills: 0 | Status: SHIPPED | OUTPATIENT
Start: 2025-08-14

## 2025-08-15 ENCOUNTER — TELEPHONE (OUTPATIENT)
Dept: INTERNAL MEDICINE | Facility: CLINIC | Age: OVER 89
End: 2025-08-15
Payer: MEDICARE

## 2025-08-18 ENCOUNTER — TREATMENT (OUTPATIENT)
Dept: PHYSICAL THERAPY | Facility: CLINIC | Age: OVER 89
End: 2025-08-18
Payer: MEDICARE

## 2025-08-18 DIAGNOSIS — R26.89 BALANCE PROBLEM: Primary | ICD-10-CM

## 2025-08-18 DIAGNOSIS — N18.4 CKD (CHRONIC KIDNEY DISEASE) STAGE 4, GFR 15-29 ML/MIN: ICD-10-CM

## 2025-08-18 DIAGNOSIS — R73.9 HYPERGLYCEMIA: ICD-10-CM

## 2025-08-18 DIAGNOSIS — R25.1 OCCASIONAL TREMORS: Primary | ICD-10-CM

## 2025-08-18 PROCEDURE — 97112 NEUROMUSCULAR REEDUCATION: CPT | Performed by: PHYSICAL THERAPIST

## 2025-08-18 PROCEDURE — 97110 THERAPEUTIC EXERCISES: CPT | Performed by: PHYSICAL THERAPIST

## 2025-08-18 PROCEDURE — 97530 THERAPEUTIC ACTIVITIES: CPT | Performed by: PHYSICAL THERAPIST

## 2025-08-19 LAB
HBA1C MFR BLD: 6.7 % (ref 4.8–5.6)
WRITTEN AUTHORIZATION: NORMAL

## 2025-08-20 ENCOUNTER — TREATMENT (OUTPATIENT)
Dept: PHYSICAL THERAPY | Facility: CLINIC | Age: OVER 89
End: 2025-08-20
Payer: MEDICARE

## 2025-08-20 DIAGNOSIS — R26.89 BALANCE PROBLEM: Primary | ICD-10-CM

## 2025-08-20 PROCEDURE — 97530 THERAPEUTIC ACTIVITIES: CPT | Performed by: PHYSICAL THERAPIST

## 2025-08-20 PROCEDURE — 97110 THERAPEUTIC EXERCISES: CPT | Performed by: PHYSICAL THERAPIST

## 2025-08-25 ENCOUNTER — TREATMENT (OUTPATIENT)
Dept: PHYSICAL THERAPY | Facility: CLINIC | Age: OVER 89
End: 2025-08-25
Payer: MEDICARE

## 2025-08-25 DIAGNOSIS — R26.89 BALANCE PROBLEM: Primary | ICD-10-CM

## 2025-08-25 PROCEDURE — 97110 THERAPEUTIC EXERCISES: CPT | Performed by: PHYSICAL THERAPIST

## 2025-08-25 PROCEDURE — 97112 NEUROMUSCULAR REEDUCATION: CPT | Performed by: PHYSICAL THERAPIST

## 2025-08-25 PROCEDURE — 97530 THERAPEUTIC ACTIVITIES: CPT | Performed by: PHYSICAL THERAPIST

## (undated) DEVICE — SHEET,DRAPE,53X77,STERILE: Brand: MEDLINE

## (undated) DEVICE — HANDPIECE SET WITH HIGH FLOW TIP AND SUCTION TUBE: Brand: INTERPULSE

## (undated) DEVICE — GLV SURG SENSICARE PI MIC PF SZ9 LF STRL

## (undated) DEVICE — SOL PVPI 10PCT 0.75OZ PEEL/PCH/PACKET 1P/U STRL

## (undated) DEVICE — PREMIUM DRY TRAY LF: Brand: MEDLINE INDUSTRIES, INC.

## (undated) DEVICE — UNDERGLV SURG BIOGEL INDICAT PI SZ8.5 BLU

## (undated) DEVICE — DRSNG WND STRIP OPTIFOAM AG SUPRABS A/MIC 4X8IN STRL

## (undated) DEVICE — BLANKT WARM UPPR/BDY ARM/OUT 57X196CM

## (undated) DEVICE — SYR CONTRL LUERLOK 10CC

## (undated) DEVICE — GLV SURG SENSICARE PI ORTHO SZ8.5 LF STRL

## (undated) DEVICE — ANTIBACTERIAL UNDYED BRAIDED (POLYGLACTIN 910), SYNTHETIC ABSORBABLE SUTURE: Brand: COATED VICRYL

## (undated) DEVICE — SOL HYDROGEN PEROX 3PCT 4OZ

## (undated) DEVICE — PULLOVER TOGA, 2X LARGE: Brand: FLYTE, SURGICOOL

## (undated) DEVICE — PATIENT RETURN ELECTRODE, SINGLE-USE, CONTACT QUALITY MONITORING, ADULT, WITH 9FT CORD, FOR PATIENTS WEIGING OVER 33LBS. (15KG): Brand: MEGADYNE

## (undated) DEVICE — SUT MNCRYL PLS ANTIB UD 3/0 PS2 27IN

## (undated) DEVICE — STRYKER PERFORMANCE SERIES SAGITTAL BLADE: Brand: STRYKER PERFORMANCE SERIES

## (undated) DEVICE — SCRB SURG BACTOSHIELD CHG 4PCT 4OZ

## (undated) DEVICE — Device

## (undated) DEVICE — PK MAJ SHLDR SPLT 10

## (undated) DEVICE — BOWL UTIL STRL 32OZ

## (undated) DEVICE — MARKER,SKIN,W/RULER,DUAL,STOP: Brand: MEDLINE

## (undated) DEVICE — CEMENT MIXING SYSTEM WITH MIS FEMORAL BREAKAWAY NOZZLE: Brand: REVOLUTION

## (undated) DEVICE — HYPODERMIC SAFETY NEEDLE: Brand: MONOJECT

## (undated) DEVICE — 6617 IOBAN II PATIENT ISOLATION DRAPE 5/BX,4BX/CS: Brand: STERI-DRAPE™ IOBAN™ 2

## (undated) DEVICE — MICRO HVTSA, 0.5G AND HVTSA SOURCEMARK PRODUCT CODE M1206 AND M1206-01: Brand: EXOFIN MICRO HVTSA, 0.5G

## (undated) DEVICE — SEAL FEM EXETER 1/2/MOON DISP

## (undated) DEVICE — SOL ISO/ALC RUB 70PCT 4OZ

## (undated) DEVICE — COAXIAL FEMORAL CANAL TIP

## (undated) DEVICE — SPK10295 ORTHOPEDIC FRACTURE KIT: Brand: SPK10295 ORTHOPEDIC FRACTURE KIT

## (undated) DEVICE — DISPOSABLE ORTHOPAEDIC PROTECTOR: Brand: ALEXIS ® ORTHOPAEDIC PROTECTOR

## (undated) DEVICE — C-ARM DRAPE: Brand: DEROYAL

## (undated) DEVICE — BIT DRL TRIDENT2 TRITANIUM 3.3X40MM DISP

## (undated) DEVICE — STRIP,CLOSURE,WOUND,MEDI-STRIP,1/2X4: Brand: MEDLINE

## (undated) DEVICE — PENCL SMOKE/EVAC MEGADYNE TELESCP 10FT